# Patient Record
Sex: FEMALE | Race: WHITE | NOT HISPANIC OR LATINO | Employment: OTHER | URBAN - METROPOLITAN AREA
[De-identification: names, ages, dates, MRNs, and addresses within clinical notes are randomized per-mention and may not be internally consistent; named-entity substitution may affect disease eponyms.]

---

## 2019-04-23 ENCOUNTER — HOSPITAL ENCOUNTER (EMERGENCY)
Facility: HOSPITAL | Age: 74
Discharge: HOME | End: 2019-04-23
Attending: EMERGENCY MEDICINE
Payer: COMMERCIAL

## 2019-04-23 ENCOUNTER — APPOINTMENT (EMERGENCY)
Dept: RADIOLOGY | Facility: HOSPITAL | Age: 74
End: 2019-04-23
Payer: COMMERCIAL

## 2019-04-23 VITALS
RESPIRATION RATE: 14 BRPM | SYSTOLIC BLOOD PRESSURE: 146 MMHG | TEMPERATURE: 99.2 F | WEIGHT: 114 LBS | DIASTOLIC BLOOD PRESSURE: 83 MMHG | HEART RATE: 93 BPM | OXYGEN SATURATION: 100 %

## 2019-04-23 DIAGNOSIS — M25.562 KNEE PAIN, LEFT: ICD-10-CM
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
## 2019-04-23 PROCEDURE — 99282 EMERGENCY DEPT VISIT SF MDM: CPT | Performed by: EMERGENCY MEDICINE

## 2019-04-23 PROCEDURE — 93005 ELECTROCARDIOGRAM TRACING: CPT

## 2019-04-23 PROCEDURE — 99283 EMERGENCY DEPT VISIT LOW MDM: CPT

## 2019-04-23 PROCEDURE — 73564 X-RAY EXAM KNEE 4 OR MORE: CPT

## 2019-04-23 RX ORDER — LACTOSE-REDUCED FOOD
8 LIQUID (ML) ORAL 2 TIMES DAILY
Status: ON HOLD | COMMUNITY
End: 2019-05-03 | Stop reason: SDUPTHER

## 2019-04-23 RX ORDER — LISINOPRIL 5 MG/1
5 TABLET ORAL DAILY
Status: ON HOLD | COMMUNITY
Start: 2018-06-12 | End: 2019-05-03 | Stop reason: SDUPTHER

## 2019-04-23 RX ORDER — DOCUSATE SODIUM 100 MG/1
100 CAPSULE, LIQUID FILLED ORAL DAILY
Status: ON HOLD | COMMUNITY
Start: 2018-02-06 | End: 2019-05-03 | Stop reason: SDUPTHER

## 2019-04-23 RX ORDER — PROMETHAZINE HYDROCHLORIDE 25 MG/1
25 TABLET ORAL EVERY 8 HOURS PRN
COMMUNITY
End: 2019-05-01 | Stop reason: HOSPADM

## 2019-04-23 RX ORDER — RIVASTIGMINE TARTRATE 4.5 MG/1
1 CAPSULE ORAL DAILY
Status: ON HOLD | COMMUNITY
Start: 2018-11-26 | End: 2019-05-03 | Stop reason: SDUPTHER

## 2019-04-23 RX ORDER — MULTIVIT-MIN/FA/LYCOPEN/LUTEIN .4-300-25
1 TABLET ORAL DAILY
COMMUNITY
End: 2019-05-03 | Stop reason: HOSPADM

## 2019-04-24 ENCOUNTER — HOSPITAL ENCOUNTER (INPATIENT)
Facility: HOSPITAL | Age: 74
LOS: 7 days | Discharge: NURSING, REHAB OR LONG-TERM CARE | DRG: 689 | End: 2019-05-01
Attending: EMERGENCY MEDICINE | Admitting: INTERNAL MEDICINE
Payer: COMMERCIAL

## 2019-04-24 ENCOUNTER — APPOINTMENT (EMERGENCY)
Dept: CT IMAGING | Facility: HOSPITAL | Age: 74
DRG: 689 | End: 2019-04-24
Payer: COMMERCIAL

## 2019-04-24 ENCOUNTER — HOSPITAL ENCOUNTER (EMERGENCY)
Facility: HOSPITAL | Age: 74
Discharge: HOME | DRG: 689 | End: 2019-04-24
Attending: EMERGENCY MEDICINE
Payer: COMMERCIAL

## 2019-04-24 VITALS
HEART RATE: 86 BPM | OXYGEN SATURATION: 100 % | SYSTOLIC BLOOD PRESSURE: 178 MMHG | RESPIRATION RATE: 16 BRPM | TEMPERATURE: 98.3 F | DIASTOLIC BLOOD PRESSURE: 76 MMHG
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
## 2019-04-24 DIAGNOSIS — N30.00 ACUTE CYSTITIS WITHOUT HEMATURIA: ICD-10-CM

## 2019-04-24 DIAGNOSIS — S09.90XA INJURY OF HEAD, INITIAL ENCOUNTER: ICD-10-CM

## 2019-04-24 DIAGNOSIS — R29.6 RECURRENT FALLS: ICD-10-CM

## 2019-04-24 DIAGNOSIS — S01.81XA LACERATION OF FOREHEAD, INITIAL ENCOUNTER: ICD-10-CM

## 2019-04-24 DIAGNOSIS — G31.09 FRONTOTEMPORAL DEMENTIA WITHOUT BEHAVIORAL DISTURBANCE (HCC): Chronic | ICD-10-CM

## 2019-04-24 DIAGNOSIS — F02.80 FRONTOTEMPORAL DEMENTIA WITHOUT BEHAVIORAL DISTURBANCE (HCC): Chronic | ICD-10-CM
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
## 2019-04-24 DIAGNOSIS — R26.2 AMBULATORY DYSFUNCTION: ICD-10-CM

## 2019-04-24 PROBLEM — G31.01 PRIMARY PROGRESSIVE APHASIA (HCC): Chronic | Status: ACTIVE | Noted: 2019-04-24

## 2019-04-24 PROBLEM — E87.6 HYPOKALEMIA: Status: ACTIVE | Noted: 2019-04-24

## 2019-04-24 PROBLEM — E78.00 PURE HYPERCHOLESTEROLEMIA: Chronic | Status: ACTIVE | Noted: 2019-04-24

## 2019-04-24 PROBLEM — I10 BENIGN ESSENTIAL HYPERTENSION: Chronic | Status: ACTIVE | Noted: 2019-04-24

## 2019-04-24 LAB
ALBUMIN SERPL BCP-MCNC: 3.7 G/DL (ref 3.5–5)
ALP SERPL-CCNC: 86 U/L (ref 46–116)
ALT SERPL W P-5'-P-CCNC: 29 U/L (ref 12–78)
ANION GAP SERPL CALCULATED.3IONS-SCNC: 8 MMOL/L (ref 4–13)
AST SERPL W P-5'-P-CCNC: 37 U/L (ref 5–45)
ATRIAL RATE: 82 BPM
BACTERIA UR QL AUTO: ABNORMAL /HPF
BASOPHILS # BLD AUTO: 0.04 THOUSANDS/ΜL (ref 0–0.1)
BASOPHILS NFR BLD AUTO: 1 % (ref 0–1)
BILIRUB SERPL-MCNC: 0.44 MG/DL (ref 0.2–1)
BILIRUB UR QL STRIP: NEGATIVE
BUN SERPL-MCNC: 22 MG/DL (ref 5–25)
CALCIUM SERPL-MCNC: 9.7 MG/DL (ref 8.3–10.1)
CHLORIDE SERPL-SCNC: 107 MMOL/L (ref 100–108)
CLARITY UR: CLEAR
CO2 SERPL-SCNC: 27 MMOL/L (ref 21–32)
COLOR UR: YELLOW
CREAT SERPL-MCNC: 0.89 MG/DL (ref 0.6–1.3)
EOSINOPHIL # BLD AUTO: 0.06 THOUSAND/ΜL (ref 0–0.61)
EOSINOPHIL NFR BLD AUTO: 1 % (ref 0–6)
ERYTHROCYTE [DISTWIDTH] IN BLOOD BY AUTOMATED COUNT: 13.3 % (ref 11.6–15.1)
GFR SERPL CREATININE-BSD FRML MDRD: 64 ML/MIN/1.73SQ M
GLUCOSE SERPL-MCNC: 105 MG/DL (ref 65–140)
GLUCOSE UR STRIP-MCNC: NEGATIVE MG/DL
HCT VFR BLD AUTO: 38.4 % (ref 34.8–46.1)
HGB BLD-MCNC: 12.6 G/DL (ref 11.5–15.4)
HGB UR QL STRIP.AUTO: NEGATIVE
IMM GRANULOCYTES # BLD AUTO: 0.02 THOUSAND/UL (ref 0–0.2)
IMM GRANULOCYTES NFR BLD AUTO: 0 % (ref 0–2)
KETONES UR STRIP-MCNC: ABNORMAL MG/DL
LEUKOCYTE ESTERASE UR QL STRIP: ABNORMAL
LYMPHOCYTES # BLD AUTO: 0.96 THOUSANDS/ΜL (ref 0.6–4.47)
LYMPHOCYTES NFR BLD AUTO: 12 % (ref 14–44)
MCH RBC QN AUTO: 29.6 PG (ref 26.8–34.3)
MCHC RBC AUTO-ENTMCNC: 32.8 G/DL (ref 31.4–37.4)
MCV RBC AUTO: 90 FL (ref 82–98)
MONOCYTES # BLD AUTO: 0.71 THOUSAND/ΜL (ref 0.17–1.22)
MONOCYTES NFR BLD AUTO: 9 % (ref 4–12)
MUCOUS THREADS UR QL AUTO: ABNORMAL
NEUTROPHILS # BLD AUTO: 6.07 THOUSANDS/ΜL (ref 1.85–7.62)
NEUTS SEG NFR BLD AUTO: 77 % (ref 43–75)
NITRITE UR QL STRIP: NEGATIVE
NON-SQ EPI CELLS URNS QL MICRO: ABNORMAL /HPF
NRBC BLD AUTO-RTO: 0 /100 WBCS
P AXIS: 64 DEGREES
PH UR STRIP.AUTO: 6 [PH]
PLATELET # BLD AUTO: 346 THOUSANDS/UL (ref 149–390)
PMV BLD AUTO: 9.4 FL (ref 8.9–12.7)
POTASSIUM SERPL-SCNC: 3.4 MMOL/L (ref 3.5–5.3)
PR INTERVAL: 160 MS
PROT SERPL-MCNC: 7.4 G/DL (ref 6.4–8.2)
PROT UR STRIP-MCNC: NEGATIVE MG/DL
QRS AXIS: 67 DEGREES
QRSD INTERVAL: 76 MS
QT INTERVAL: 378 MS
QTC INTERVAL: 441 MS
RBC # BLD AUTO: 4.25 MILLION/UL (ref 3.81–5.12)
RBC #/AREA URNS AUTO: ABNORMAL /HPF
SODIUM SERPL-SCNC: 142 MMOL/L (ref 136–145)
SP GR UR STRIP.AUTO: 1.02 (ref 1–1.03)
T WAVE AXIS: 48 DEGREES
UROBILINOGEN UR QL STRIP.AUTO: 0.2 E.U./DL
VENTRICULAR RATE: 82 BPM
WBC # BLD AUTO: 7.86 THOUSAND/UL (ref 4.31–10.16)
WBC #/AREA URNS AUTO: ABNORMAL /HPF

## 2019-04-24 PROCEDURE — 87086 URINE CULTURE/COLONY COUNT: CPT | Performed by: EMERGENCY MEDICINE

## 2019-04-24 PROCEDURE — 90715 TDAP VACCINE 7 YRS/> IM: CPT | Performed by: EMERGENCY MEDICINE

## 2019-04-24 PROCEDURE — 93005 ELECTROCARDIOGRAM TRACING: CPT

## 2019-04-24 PROCEDURE — 99223 1ST HOSP IP/OBS HIGH 75: CPT | Performed by: NURSE PRACTITIONER

## 2019-04-24 PROCEDURE — 93010 ELECTROCARDIOGRAM REPORT: CPT | Performed by: INTERNAL MEDICINE

## 2019-04-24 PROCEDURE — 72125 CT NECK SPINE W/O DYE: CPT

## 2019-04-24 PROCEDURE — 80053 COMPREHEN METABOLIC PANEL: CPT | Performed by: EMERGENCY MEDICINE

## 2019-04-24 PROCEDURE — 70450 CT HEAD/BRAIN W/O DYE: CPT

## 2019-04-24 PROCEDURE — 87186 SC STD MICRODIL/AGAR DIL: CPT | Performed by: EMERGENCY MEDICINE

## 2019-04-24 PROCEDURE — 81001 URINALYSIS AUTO W/SCOPE: CPT | Performed by: EMERGENCY MEDICINE

## 2019-04-24 PROCEDURE — 85025 COMPLETE CBC W/AUTO DIFF WBC: CPT | Performed by: EMERGENCY MEDICINE

## 2019-04-24 PROCEDURE — 99285 EMERGENCY DEPT VISIT HI MDM: CPT | Performed by: EMERGENCY MEDICINE

## 2019-04-24 PROCEDURE — 87077 CULTURE AEROBIC IDENTIFY: CPT | Performed by: EMERGENCY MEDICINE

## 2019-04-24 PROCEDURE — 12011 RPR F/E/E/N/L/M 2.5 CM/<: CPT | Performed by: EMERGENCY MEDICINE

## 2019-04-24 PROCEDURE — NC001 PR NO CHARGE: Performed by: EMERGENCY MEDICINE

## 2019-04-24 PROCEDURE — 36415 COLL VENOUS BLD VENIPUNCTURE: CPT | Performed by: EMERGENCY MEDICINE

## 2019-04-24 PROCEDURE — 90471 IMMUNIZATION ADMIN: CPT

## 2019-04-24 PROCEDURE — 99284 EMERGENCY DEPT VISIT MOD MDM: CPT

## 2019-04-24 PROCEDURE — 99285 EMERGENCY DEPT VISIT HI MDM: CPT

## 2019-04-24 RX ORDER — ACETAMINOPHEN 325 MG/1
650 TABLET ORAL EVERY 6 HOURS PRN
Status: DISCONTINUED | OUTPATIENT
Start: 2019-04-24 | End: 2019-05-01 | Stop reason: HOSPADM

## 2019-04-24 RX ORDER — B-COMPLEX WITH VITAMIN C
1 TABLET ORAL
Status: DISCONTINUED | OUTPATIENT
Start: 2019-04-25 | End: 2019-05-01 | Stop reason: HOSPADM

## 2019-04-24 RX ORDER — DOCUSATE SODIUM 100 MG/1
100 CAPSULE, LIQUID FILLED ORAL DAILY
Status: DISCONTINUED | OUTPATIENT
Start: 2019-04-25 | End: 2019-05-01 | Stop reason: HOSPADM

## 2019-04-24 RX ORDER — MINERAL OIL AND PETROLATUM 150; 830 MG/G; MG/G
OINTMENT OPHTHALMIC
Status: DISCONTINUED | OUTPATIENT
Start: 2019-04-24 | End: 2019-05-01 | Stop reason: HOSPADM

## 2019-04-24 RX ORDER — CHOLECALCIFEROL (VITAMIN D3) 125 MCG
1000 CAPSULE ORAL DAILY
Status: DISCONTINUED | OUTPATIENT
Start: 2019-04-25 | End: 2019-05-01 | Stop reason: HOSPADM

## 2019-04-24 RX ORDER — SENNOSIDES 8.6 MG
1 TABLET ORAL
Status: DISCONTINUED | OUTPATIENT
Start: 2019-04-24 | End: 2019-05-01 | Stop reason: HOSPADM

## 2019-04-24 RX ORDER — SODIUM CHLORIDE 9 MG/ML
75 INJECTION, SOLUTION INTRAVENOUS CONTINUOUS
Status: DISCONTINUED | OUTPATIENT
Start: 2019-04-24 | End: 2019-04-25

## 2019-04-24 RX ORDER — ONDANSETRON 2 MG/ML
4 INJECTION INTRAMUSCULAR; INTRAVENOUS EVERY 6 HOURS PRN
Status: DISCONTINUED | OUTPATIENT
Start: 2019-04-24 | End: 2019-05-01 | Stop reason: HOSPADM

## 2019-04-24 RX ORDER — LISINOPRIL 5 MG/1
5 TABLET ORAL DAILY
Status: DISCONTINUED | OUTPATIENT
Start: 2019-04-25 | End: 2019-05-01 | Stop reason: HOSPADM

## 2019-04-24 RX ORDER — POTASSIUM CHLORIDE 20 MEQ/1
20 TABLET, EXTENDED RELEASE ORAL ONCE
Status: COMPLETED | OUTPATIENT
Start: 2019-04-24 | End: 2019-04-24

## 2019-04-24 RX ADMIN — POTASSIUM CHLORIDE 20 MEQ: 1500 TABLET, EXTENDED RELEASE ORAL at 22:01

## 2019-04-24 RX ADMIN — TETANUS TOXOID, REDUCED DIPHTHERIA TOXOID AND ACELLULAR PERTUSSIS VACCINE, ADSORBED 0.5 ML: 5; 2.5; 8; 8; 2.5 SUSPENSION INTRAMUSCULAR at 08:23

## 2019-04-24 RX ADMIN — WHITE PETROLATUM 57.7 %-MINERAL OIL 31.9 % EYE OINTMENT: at 22:01

## 2019-04-24 RX ADMIN — SODIUM CHLORIDE 75 ML/HR: 0.9 INJECTION, SOLUTION INTRAVENOUS at 20:18

## 2019-04-24 RX ADMIN — Medication 1 APPLICATION: at 07:25

## 2019-04-25 PROBLEM — N30.00 ACUTE CYSTITIS WITHOUT HEMATURIA: Status: ACTIVE | Noted: 2019-04-25

## 2019-04-25 LAB
ANION GAP SERPL CALCULATED.3IONS-SCNC: 7 MMOL/L (ref 4–13)
ATRIAL RATE: 86 BPM
ATRIAL RATE: 90 BPM
BASOPHILS # BLD AUTO: 0.06 THOUSANDS/ΜL (ref 0–0.1)
BASOPHILS NFR BLD AUTO: 1 % (ref 0–1)
BUN SERPL-MCNC: 13 MG/DL (ref 5–25)
CALCIUM SERPL-MCNC: 9 MG/DL (ref 8.3–10.1)
CHLORIDE SERPL-SCNC: 110 MMOL/L (ref 100–108)
CO2 SERPL-SCNC: 26 MMOL/L (ref 21–32)
CREAT SERPL-MCNC: 0.69 MG/DL (ref 0.6–1.3)
EOSINOPHIL # BLD AUTO: 0.23 THOUSAND/ΜL (ref 0–0.61)
EOSINOPHIL NFR BLD AUTO: 3 % (ref 0–6)
ERYTHROCYTE [DISTWIDTH] IN BLOOD BY AUTOMATED COUNT: 13.3 % (ref 11.6–15.1)
GFR SERPL CREATININE-BSD FRML MDRD: 85 ML/MIN/1.73SQ M
GLUCOSE SERPL-MCNC: 93 MG/DL (ref 65–140)
HCT VFR BLD AUTO: 37 % (ref 34.8–46.1)
HGB BLD-MCNC: 12 G/DL (ref 11.5–15.4)
IMM GRANULOCYTES # BLD AUTO: 0.03 THOUSAND/UL (ref 0–0.2)
IMM GRANULOCYTES NFR BLD AUTO: 0 % (ref 0–2)
LYMPHOCYTES # BLD AUTO: 2.03 THOUSANDS/ΜL (ref 0.6–4.47)
LYMPHOCYTES NFR BLD AUTO: 27 % (ref 14–44)
MCH RBC QN AUTO: 29.5 PG (ref 26.8–34.3)
MCHC RBC AUTO-ENTMCNC: 32.4 G/DL (ref 31.4–37.4)
MCV RBC AUTO: 91 FL (ref 82–98)
MONOCYTES # BLD AUTO: 0.68 THOUSAND/ΜL (ref 0.17–1.22)
MONOCYTES NFR BLD AUTO: 9 % (ref 4–12)
NEUTROPHILS # BLD AUTO: 4.54 THOUSANDS/ΜL (ref 1.85–7.62)
NEUTS SEG NFR BLD AUTO: 60 % (ref 43–75)
NRBC BLD AUTO-RTO: 0 /100 WBCS
P AXIS: 68 DEGREES
P AXIS: 73 DEGREES
PLATELET # BLD AUTO: 308 THOUSANDS/UL (ref 149–390)
PMV BLD AUTO: 9.4 FL (ref 8.9–12.7)
POTASSIUM SERPL-SCNC: 3.8 MMOL/L (ref 3.5–5.3)
PR INTERVAL: 156 MS
PR INTERVAL: 174 MS
QRS AXIS: 62 DEGREES
QRS AXIS: 65 DEGREES
QRSD INTERVAL: 76 MS
QRSD INTERVAL: 78 MS
QT INTERVAL: 358 MS
QT INTERVAL: 366 MS
QTC INTERVAL: 428 MS
QTC INTERVAL: 447 MS
RBC # BLD AUTO: 4.07 MILLION/UL (ref 3.81–5.12)
SODIUM SERPL-SCNC: 143 MMOL/L (ref 136–145)
T WAVE AXIS: 58 DEGREES
T WAVE AXIS: 59 DEGREES
VENTRICULAR RATE: 86 BPM
VENTRICULAR RATE: 90 BPM
WBC # BLD AUTO: 7.57 THOUSAND/UL (ref 4.31–10.16)

## 2019-04-25 PROCEDURE — G8979 MOBILITY GOAL STATUS: HCPCS

## 2019-04-25 PROCEDURE — 93010 ELECTROCARDIOGRAM REPORT: CPT | Performed by: INTERNAL MEDICINE

## 2019-04-25 PROCEDURE — 97163 PT EVAL HIGH COMPLEX 45 MIN: CPT

## 2019-04-25 PROCEDURE — 99222 1ST HOSP IP/OBS MODERATE 55: CPT | Performed by: PSYCHIATRY & NEUROLOGY

## 2019-04-25 PROCEDURE — 80048 BASIC METABOLIC PNL TOTAL CA: CPT | Performed by: NURSE PRACTITIONER

## 2019-04-25 PROCEDURE — 85025 COMPLETE CBC W/AUTO DIFF WBC: CPT | Performed by: NURSE PRACTITIONER

## 2019-04-25 PROCEDURE — 99232 SBSQ HOSP IP/OBS MODERATE 35: CPT | Performed by: INTERNAL MEDICINE

## 2019-04-25 PROCEDURE — G8978 MOBILITY CURRENT STATUS: HCPCS

## 2019-04-25 PROCEDURE — 97116 GAIT TRAINING THERAPY: CPT

## 2019-04-25 RX ADMIN — RIVASTIGMINE TARTRATE 4.5 MG: 3 CAPSULE ORAL at 08:31

## 2019-04-25 RX ADMIN — CEFTRIAXONE 1000 MG: 1 INJECTION, POWDER, FOR SOLUTION INTRAMUSCULAR; INTRAVENOUS at 05:19

## 2019-04-25 RX ADMIN — ENOXAPARIN SODIUM 40 MG: 40 INJECTION SUBCUTANEOUS at 08:22

## 2019-04-25 RX ADMIN — DOCUSATE SODIUM 100 MG: 100 CAPSULE, LIQUID FILLED ORAL at 08:23

## 2019-04-25 RX ADMIN — LISINOPRIL 5 MG: 5 TABLET ORAL at 08:23

## 2019-04-25 RX ADMIN — Medication 1 TABLET: at 08:23

## 2019-04-25 RX ADMIN — WHITE PETROLATUM 57.7 %-MINERAL OIL 31.9 % EYE OINTMENT: at 22:30

## 2019-04-25 RX ADMIN — CALCIUM CARBONATE-VITAMIN D TAB 500 MG-200 UNIT 1 TABLET: 500-200 TAB at 08:23

## 2019-04-25 RX ADMIN — CYANOCOBALAMIN TAB 500 MCG 1000 MCG: 500 TAB at 08:24

## 2019-04-26 ENCOUNTER — APPOINTMENT (INPATIENT)
Dept: MRI IMAGING | Facility: HOSPITAL | Age: 74
DRG: 689 | End: 2019-04-26
Payer: COMMERCIAL

## 2019-04-26 LAB
ANION GAP SERPL CALCULATED.3IONS-SCNC: 5 MMOL/L (ref 4–13)
BACTERIA UR CULT: ABNORMAL
BACTERIA UR CULT: ABNORMAL
BUN SERPL-MCNC: 9 MG/DL (ref 5–25)
CALCIUM SERPL-MCNC: 9.3 MG/DL (ref 8.3–10.1)
CHLORIDE SERPL-SCNC: 109 MMOL/L (ref 100–108)
CO2 SERPL-SCNC: 27 MMOL/L (ref 21–32)
CREAT SERPL-MCNC: 0.7 MG/DL (ref 0.6–1.3)
ERYTHROCYTE [DISTWIDTH] IN BLOOD BY AUTOMATED COUNT: 13.1 % (ref 11.6–15.1)
GFR SERPL CREATININE-BSD FRML MDRD: 85 ML/MIN/1.73SQ M
GLUCOSE SERPL-MCNC: 92 MG/DL (ref 65–140)
HCT VFR BLD AUTO: 36.1 % (ref 34.8–46.1)
HGB BLD-MCNC: 11.7 G/DL (ref 11.5–15.4)
MCH RBC QN AUTO: 29.2 PG (ref 26.8–34.3)
MCHC RBC AUTO-ENTMCNC: 32.4 G/DL (ref 31.4–37.4)
MCV RBC AUTO: 90 FL (ref 82–98)
PLATELET # BLD AUTO: 274 THOUSANDS/UL (ref 149–390)
PMV BLD AUTO: 9.3 FL (ref 8.9–12.7)
POTASSIUM SERPL-SCNC: 4 MMOL/L (ref 3.5–5.3)
RBC # BLD AUTO: 4.01 MILLION/UL (ref 3.81–5.12)
SODIUM SERPL-SCNC: 141 MMOL/L (ref 136–145)
WBC # BLD AUTO: 5.26 THOUSAND/UL (ref 4.31–10.16)

## 2019-04-26 PROCEDURE — 99232 SBSQ HOSP IP/OBS MODERATE 35: CPT | Performed by: INTERNAL MEDICINE

## 2019-04-26 PROCEDURE — G8987 SELF CARE CURRENT STATUS: HCPCS

## 2019-04-26 PROCEDURE — 70551 MRI BRAIN STEM W/O DYE: CPT

## 2019-04-26 PROCEDURE — 80048 BASIC METABOLIC PNL TOTAL CA: CPT | Performed by: INTERNAL MEDICINE

## 2019-04-26 PROCEDURE — G8988 SELF CARE GOAL STATUS: HCPCS

## 2019-04-26 PROCEDURE — 97166 OT EVAL MOD COMPLEX 45 MIN: CPT

## 2019-04-26 PROCEDURE — 85027 COMPLETE CBC AUTOMATED: CPT | Performed by: INTERNAL MEDICINE

## 2019-04-26 PROCEDURE — 99232 SBSQ HOSP IP/OBS MODERATE 35: CPT | Performed by: PSYCHIATRY & NEUROLOGY

## 2019-04-26 RX ADMIN — RIVASTIGMINE TARTRATE 4.5 MG: 3 CAPSULE ORAL at 08:06

## 2019-04-26 RX ADMIN — WHITE PETROLATUM 57.7 %-MINERAL OIL 31.9 % EYE OINTMENT: at 21:15

## 2019-04-26 RX ADMIN — LISINOPRIL 5 MG: 5 TABLET ORAL at 08:06

## 2019-04-26 RX ADMIN — CALCIUM CARBONATE-VITAMIN D TAB 500 MG-200 UNIT 1 TABLET: 500-200 TAB at 08:06

## 2019-04-26 RX ADMIN — ENOXAPARIN SODIUM 40 MG: 40 INJECTION SUBCUTANEOUS at 08:06

## 2019-04-26 RX ADMIN — CYANOCOBALAMIN TAB 500 MCG 1000 MCG: 500 TAB at 08:06

## 2019-04-26 RX ADMIN — Medication 1 TABLET: at 08:06

## 2019-04-26 RX ADMIN — CEFTRIAXONE 1000 MG: 1 INJECTION, POWDER, FOR SOLUTION INTRAMUSCULAR; INTRAVENOUS at 04:30

## 2019-04-26 RX ADMIN — DOCUSATE SODIUM 100 MG: 100 CAPSULE, LIQUID FILLED ORAL at 08:06

## 2019-04-27 PROCEDURE — 97116 GAIT TRAINING THERAPY: CPT

## 2019-04-27 PROCEDURE — 99232 SBSQ HOSP IP/OBS MODERATE 35: CPT | Performed by: INTERNAL MEDICINE

## 2019-04-27 PROCEDURE — 99232 SBSQ HOSP IP/OBS MODERATE 35: CPT | Performed by: PSYCHIATRY & NEUROLOGY

## 2019-04-27 PROCEDURE — 97530 THERAPEUTIC ACTIVITIES: CPT

## 2019-04-27 PROCEDURE — 97110 THERAPEUTIC EXERCISES: CPT

## 2019-04-27 RX ORDER — CEPHALEXIN 500 MG/1
500 CAPSULE ORAL EVERY 12 HOURS SCHEDULED
Status: DISCONTINUED | OUTPATIENT
Start: 2019-04-27 | End: 2019-05-01 | Stop reason: HOSPADM

## 2019-04-27 RX ADMIN — CYANOCOBALAMIN TAB 500 MCG 1000 MCG: 500 TAB at 08:21

## 2019-04-27 RX ADMIN — CEFTRIAXONE 1000 MG: 1 INJECTION, POWDER, FOR SOLUTION INTRAMUSCULAR; INTRAVENOUS at 04:30

## 2019-04-27 RX ADMIN — WHITE PETROLATUM 57.7 %-MINERAL OIL 31.9 % EYE OINTMENT: at 20:59

## 2019-04-27 RX ADMIN — CEPHALEXIN 500 MG: 500 CAPSULE ORAL at 20:16

## 2019-04-27 RX ADMIN — RIVASTIGMINE TARTRATE 4.5 MG: 3 CAPSULE ORAL at 08:21

## 2019-04-27 RX ADMIN — ENOXAPARIN SODIUM 40 MG: 40 INJECTION SUBCUTANEOUS at 08:21

## 2019-04-27 RX ADMIN — DOCUSATE SODIUM 100 MG: 100 CAPSULE, LIQUID FILLED ORAL at 08:21

## 2019-04-27 RX ADMIN — CALCIUM CARBONATE-VITAMIN D TAB 500 MG-200 UNIT 1 TABLET: 500-200 TAB at 08:21

## 2019-04-27 RX ADMIN — LISINOPRIL 5 MG: 5 TABLET ORAL at 08:21

## 2019-04-27 RX ADMIN — Medication 1 TABLET: at 08:21

## 2019-04-28 PROCEDURE — 99232 SBSQ HOSP IP/OBS MODERATE 35: CPT | Performed by: INTERNAL MEDICINE

## 2019-04-28 RX ADMIN — ENOXAPARIN SODIUM 40 MG: 40 INJECTION SUBCUTANEOUS at 07:52

## 2019-04-28 RX ADMIN — CYANOCOBALAMIN TAB 500 MCG 1000 MCG: 500 TAB at 07:52

## 2019-04-28 RX ADMIN — CEPHALEXIN 500 MG: 500 CAPSULE ORAL at 07:56

## 2019-04-28 RX ADMIN — RIVASTIGMINE TARTRATE 4.5 MG: 3 CAPSULE ORAL at 07:53

## 2019-04-28 RX ADMIN — LISINOPRIL 5 MG: 5 TABLET ORAL at 07:57

## 2019-04-28 RX ADMIN — WHITE PETROLATUM 57.7 %-MINERAL OIL 31.9 % EYE OINTMENT: at 21:01

## 2019-04-28 RX ADMIN — CEPHALEXIN 500 MG: 500 CAPSULE ORAL at 21:01

## 2019-04-28 RX ADMIN — Medication 1 TABLET: at 07:52

## 2019-04-28 RX ADMIN — CALCIUM CARBONATE-VITAMIN D TAB 500 MG-200 UNIT 1 TABLET: 500-200 TAB at 07:51

## 2019-04-28 RX ADMIN — DOCUSATE SODIUM 100 MG: 100 CAPSULE, LIQUID FILLED ORAL at 07:52

## 2019-04-29 PROBLEM — S01.81XA FOREHEAD LACERATION: Status: ACTIVE | Noted: 2019-04-29

## 2019-04-29 PROBLEM — I73.9 PERIPHERAL VASCULAR DISEASE (HCC): Status: ACTIVE | Noted: 2019-04-29

## 2019-04-29 PROBLEM — E87.6 HYPOKALEMIA: Status: RESOLVED | Noted: 2019-04-24 | Resolved: 2019-04-29

## 2019-04-29 LAB
ANION GAP SERPL CALCULATED.3IONS-SCNC: 7 MMOL/L (ref 4–13)
BUN SERPL-MCNC: 14 MG/DL (ref 5–25)
CALCIUM SERPL-MCNC: 9.7 MG/DL (ref 8.3–10.1)
CHLORIDE SERPL-SCNC: 104 MMOL/L (ref 100–108)
CO2 SERPL-SCNC: 28 MMOL/L (ref 21–32)
CREAT SERPL-MCNC: 0.66 MG/DL (ref 0.6–1.3)
ERYTHROCYTE [DISTWIDTH] IN BLOOD BY AUTOMATED COUNT: 13.2 % (ref 11.6–15.1)
GFR SERPL CREATININE-BSD FRML MDRD: 87 ML/MIN/1.73SQ M
GLUCOSE SERPL-MCNC: 105 MG/DL (ref 65–140)
HCT VFR BLD AUTO: 44.2 % (ref 34.8–46.1)
HGB BLD-MCNC: 14.3 G/DL (ref 11.5–15.4)
MCH RBC QN AUTO: 29.1 PG (ref 26.8–34.3)
MCHC RBC AUTO-ENTMCNC: 32.4 G/DL (ref 31.4–37.4)
MCV RBC AUTO: 90 FL (ref 82–98)
PLATELET # BLD AUTO: 316 THOUSANDS/UL (ref 149–390)
PMV BLD AUTO: 9.4 FL (ref 8.9–12.7)
POTASSIUM SERPL-SCNC: 4.2 MMOL/L (ref 3.5–5.3)
RBC # BLD AUTO: 4.91 MILLION/UL (ref 3.81–5.12)
SODIUM SERPL-SCNC: 139 MMOL/L (ref 136–145)
WBC # BLD AUTO: 8.22 THOUSAND/UL (ref 4.31–10.16)

## 2019-04-29 PROCEDURE — 99232 SBSQ HOSP IP/OBS MODERATE 35: CPT | Performed by: INTERNAL MEDICINE

## 2019-04-29 PROCEDURE — 80048 BASIC METABOLIC PNL TOTAL CA: CPT | Performed by: INTERNAL MEDICINE

## 2019-04-29 PROCEDURE — 85027 COMPLETE CBC AUTOMATED: CPT | Performed by: INTERNAL MEDICINE

## 2019-04-29 RX ADMIN — ENOXAPARIN SODIUM 40 MG: 40 INJECTION SUBCUTANEOUS at 08:26

## 2019-04-29 RX ADMIN — DOCUSATE SODIUM 100 MG: 100 CAPSULE, LIQUID FILLED ORAL at 08:26

## 2019-04-29 RX ADMIN — CYANOCOBALAMIN TAB 500 MCG 1000 MCG: 500 TAB at 08:26

## 2019-04-29 RX ADMIN — CEPHALEXIN 500 MG: 500 CAPSULE ORAL at 08:26

## 2019-04-29 RX ADMIN — Medication 1 TABLET: at 08:26

## 2019-04-29 RX ADMIN — CEPHALEXIN 500 MG: 500 CAPSULE ORAL at 22:16

## 2019-04-29 RX ADMIN — RIVASTIGMINE TARTRATE 4.5 MG: 3 CAPSULE ORAL at 08:27

## 2019-04-29 RX ADMIN — LISINOPRIL 5 MG: 5 TABLET ORAL at 08:26

## 2019-04-29 RX ADMIN — WHITE PETROLATUM 57.7 %-MINERAL OIL 31.9 % EYE OINTMENT: at 22:16

## 2019-04-29 RX ADMIN — CALCIUM CARBONATE-VITAMIN D TAB 500 MG-200 UNIT 1 TABLET: 500-200 TAB at 08:26

## 2019-04-30 PROCEDURE — 97110 THERAPEUTIC EXERCISES: CPT

## 2019-04-30 PROCEDURE — 97116 GAIT TRAINING THERAPY: CPT

## 2019-04-30 PROCEDURE — 97535 SELF CARE MNGMENT TRAINING: CPT

## 2019-04-30 PROCEDURE — 99239 HOSP IP/OBS DSCHRG MGMT >30: CPT | Performed by: INTERNAL MEDICINE

## 2019-04-30 RX ORDER — LIDOCAINE 50 MG/G
1 PATCH TOPICAL DAILY
Status: DISCONTINUED | OUTPATIENT
Start: 2019-04-30 | End: 2019-05-01 | Stop reason: HOSPADM

## 2019-04-30 RX ORDER — CEPHALEXIN 500 MG/1
500 CAPSULE ORAL EVERY 12 HOURS SCHEDULED
Qty: 3 CAPSULE | Refills: 0 | Status: SHIPPED | OUTPATIENT
Start: 2019-04-30 | End: 2019-05-01

## 2019-04-30 RX ORDER — ACETAMINOPHEN 325 MG/1
650 TABLET ORAL EVERY 6 HOURS PRN
Qty: 30 TABLET | Refills: 0 | Status: ON HOLD | OUTPATIENT
Start: 2019-04-30 | End: 2019-05-03 | Stop reason: SDUPTHER

## 2019-04-30 RX ORDER — SENNOSIDES 8.6 MG
1 TABLET ORAL
Qty: 120 EACH | Refills: 0 | Status: SHIPPED | OUTPATIENT
Start: 2019-04-30 | End: 2019-05-03 | Stop reason: HOSPADM

## 2019-04-30 RX ORDER — LIDOCAINE 50 MG/G
1 PATCH TOPICAL DAILY
Qty: 30 PATCH | Refills: 0 | Status: SHIPPED | OUTPATIENT
Start: 2019-04-30 | End: 2019-05-03 | Stop reason: HOSPADM

## 2019-04-30 RX ADMIN — LISINOPRIL 5 MG: 5 TABLET ORAL at 08:02

## 2019-04-30 RX ADMIN — Medication 1 TABLET: at 08:02

## 2019-04-30 RX ADMIN — RIVASTIGMINE TARTRATE 4.5 MG: 3 CAPSULE ORAL at 08:04

## 2019-04-30 RX ADMIN — CYANOCOBALAMIN TAB 500 MCG 1000 MCG: 500 TAB at 08:02

## 2019-04-30 RX ADMIN — LIDOCAINE 1 PATCH: 50 PATCH TOPICAL at 13:57

## 2019-04-30 RX ADMIN — DOCUSATE SODIUM 100 MG: 100 CAPSULE, LIQUID FILLED ORAL at 08:02

## 2019-04-30 RX ADMIN — CEPHALEXIN 500 MG: 500 CAPSULE ORAL at 22:04

## 2019-04-30 RX ADMIN — ENOXAPARIN SODIUM 40 MG: 40 INJECTION SUBCUTANEOUS at 08:02

## 2019-04-30 RX ADMIN — ACETAMINOPHEN 650 MG: 325 TABLET ORAL at 12:00

## 2019-04-30 RX ADMIN — CALCIUM CARBONATE-VITAMIN D TAB 500 MG-200 UNIT 1 TABLET: 500-200 TAB at 08:02

## 2019-04-30 RX ADMIN — CEPHALEXIN 500 MG: 500 CAPSULE ORAL at 08:02

## 2019-04-30 RX ADMIN — WHITE PETROLATUM 57.7 %-MINERAL OIL 31.9 % EYE OINTMENT: at 22:05

## 2019-05-01 ENCOUNTER — HOSPITAL ENCOUNTER (INPATIENT)
Facility: HOSPITAL | Age: 74
LOS: 2 days | Discharge: HOME | DRG: 092 | End: 2019-05-03
Attending: FAMILY MEDICINE | Admitting: FAMILY MEDICINE
Payer: COMMERCIAL

## 2019-05-01 VITALS
SYSTOLIC BLOOD PRESSURE: 128 MMHG | TEMPERATURE: 98.4 F | WEIGHT: 126.76 LBS | DIASTOLIC BLOOD PRESSURE: 55 MMHG | RESPIRATION RATE: 20 BRPM | OXYGEN SATURATION: 98 % | HEART RATE: 80 BPM

## 2019-05-01 DIAGNOSIS — G31.09 FRONTOTEMPORAL DEMENTIA WITHOUT BEHAVIORAL DISTURBANCE (HCC): Chronic | ICD-10-CM

## 2019-05-01 DIAGNOSIS — I10 BENIGN ESSENTIAL HYPERTENSION: Primary | Chronic | ICD-10-CM

## 2019-05-01 DIAGNOSIS — F02.80 FRONTOTEMPORAL DEMENTIA WITHOUT BEHAVIORAL DISTURBANCE (HCC): Chronic | ICD-10-CM

## 2019-05-01 PROBLEM — M25.562 LEFT KNEE PAIN: Status: ACTIVE | Noted: 2019-05-01

## 2019-05-01 PROCEDURE — 99306 1ST NF CARE HIGH MDM 50: CPT | Performed by: INTERNAL MEDICINE

## 2019-05-01 PROCEDURE — 87081 CULTURE SCREEN ONLY: CPT | Performed by: INTERNAL MEDICINE

## 2019-05-01 PROCEDURE — 99239 HOSP IP/OBS DSCHRG MGMT >30: CPT | Performed by: INTERNAL MEDICINE

## 2019-05-01 RX ORDER — LISINOPRIL 5 MG/1
5 TABLET ORAL DAILY
Status: DISCONTINUED | OUTPATIENT
Start: 2019-05-02 | End: 2019-05-03 | Stop reason: HOSPADM

## 2019-05-01 RX ORDER — CEPHALEXIN 500 MG/1
500 CAPSULE ORAL EVERY 12 HOURS SCHEDULED
Qty: 1 CAPSULE | Refills: 0 | Status: SHIPPED | OUTPATIENT
Start: 2019-05-01 | End: 2019-05-03 | Stop reason: HOSPADM

## 2019-05-01 RX ORDER — SENNOSIDES 8.6 MG
1 TABLET ORAL
Status: DISCONTINUED | OUTPATIENT
Start: 2019-05-01 | End: 2019-05-03 | Stop reason: HOSPADM

## 2019-05-01 RX ORDER — LACTOSE-REDUCED FOOD
8 LIQUID (ML) ORAL 2 TIMES DAILY
Status: DISCONTINUED | OUTPATIENT
Start: 2019-05-01 | End: 2019-05-02

## 2019-05-01 RX ORDER — LANOLIN ALCOHOL/MO/W.PET/CERES
1000 CREAM (GRAM) TOPICAL DAILY
Status: DISCONTINUED | OUTPATIENT
Start: 2019-05-02 | End: 2019-05-03 | Stop reason: HOSPADM

## 2019-05-01 RX ORDER — LIDOCAINE 50 MG/G
1 PATCH TOPICAL DAILY
Status: DISCONTINUED | OUTPATIENT
Start: 2019-05-02 | End: 2019-05-03 | Stop reason: HOSPADM

## 2019-05-01 RX ORDER — B-COMPLEX WITH VITAMIN C
1 TABLET ORAL
Status: DISCONTINUED | OUTPATIENT
Start: 2019-05-02 | End: 2019-05-03 | Stop reason: HOSPADM

## 2019-05-01 RX ORDER — DOCUSATE SODIUM 100 MG/1
100 CAPSULE, LIQUID FILLED ORAL DAILY
Status: DISCONTINUED | OUTPATIENT
Start: 2019-05-02 | End: 2019-05-03 | Stop reason: HOSPADM

## 2019-05-01 RX ORDER — ACETAMINOPHEN 325 MG/1
650 TABLET ORAL EVERY 6 HOURS PRN
Status: DISCONTINUED | OUTPATIENT
Start: 2019-05-01 | End: 2019-05-03 | Stop reason: HOSPADM

## 2019-05-01 RX ORDER — RIVASTIGMINE TARTRATE 1.5 MG/1
4.5 CAPSULE ORAL DAILY
Status: DISCONTINUED | OUTPATIENT
Start: 2019-05-02 | End: 2019-05-03 | Stop reason: HOSPADM

## 2019-05-01 RX ORDER — MINERAL OIL AND PETROLATUM 150; 830 MG/G; MG/G
OINTMENT OPHTHALMIC
Status: DISCONTINUED | OUTPATIENT
Start: 2019-05-01 | End: 2019-05-03 | Stop reason: HOSPADM

## 2019-05-01 RX ORDER — CEPHALEXIN 500 MG/1
500 CAPSULE ORAL EVERY 12 HOURS SCHEDULED
Status: COMPLETED | OUTPATIENT
Start: 2019-05-01 | End: 2019-05-02

## 2019-05-01 RX ADMIN — TUBERCULIN PURIFIED PROTEIN DERIVATIVE 5 UNITS: 5 INJECTION INTRADERMAL at 20:47

## 2019-05-01 RX ADMIN — CEPHALEXIN 500 MG: 500 CAPSULE ORAL at 08:17

## 2019-05-01 RX ADMIN — CEPHALEXIN 500 MG: 500 CAPSULE ORAL at 22:25

## 2019-05-01 RX ADMIN — LISINOPRIL 5 MG: 5 TABLET ORAL at 08:16

## 2019-05-01 RX ADMIN — WHITE PETROLATUM 57.7 %-MINERAL OIL 31.9 % EYE OINTMENT: at 22:25

## 2019-05-01 RX ADMIN — CYANOCOBALAMIN TAB 500 MCG 1000 MCG: 500 TAB at 08:17

## 2019-05-01 RX ADMIN — DOCUSATE SODIUM 100 MG: 100 CAPSULE, LIQUID FILLED ORAL at 08:17

## 2019-05-01 RX ADMIN — ENOXAPARIN SODIUM 40 MG: 40 INJECTION SUBCUTANEOUS at 08:17

## 2019-05-01 RX ADMIN — CALCIUM CARBONATE-VITAMIN D TAB 500 MG-200 UNIT 1 TABLET: 500-200 TAB at 08:17

## 2019-05-01 RX ADMIN — RIVASTIGMINE TARTRATE 4.5 MG: 3 CAPSULE ORAL at 08:17

## 2019-05-01 RX ADMIN — Medication 1 TABLET: at 08:17

## 2019-05-01 RX ADMIN — LIDOCAINE 1 PATCH: 50 PATCH TOPICAL at 08:16

## 2019-05-02 LAB — PLATELET # BLD AUTO: 329 THOUSANDS/UL (ref 150–450)

## 2019-05-02 PROCEDURE — 97163 PT EVAL HIGH COMPLEX 45 MIN: CPT

## 2019-05-02 PROCEDURE — 85049 AUTOMATED PLATELET COUNT: CPT | Performed by: FAMILY MEDICINE

## 2019-05-02 PROCEDURE — 97530 THERAPEUTIC ACTIVITIES: CPT

## 2019-05-02 PROCEDURE — 97116 GAIT TRAINING THERAPY: CPT

## 2019-05-02 PROCEDURE — 97166 OT EVAL MOD COMPLEX 45 MIN: CPT

## 2019-05-02 RX ADMIN — CALCIUM CARBONATE-VITAMIN D TAB 500 MG-200 UNIT 1 TABLET: 500-200 TAB at 09:07

## 2019-05-02 RX ADMIN — Medication 1 TABLET: at 09:07

## 2019-05-02 RX ADMIN — WHITE PETROLATUM 57.7 %-MINERAL OIL 31.9 % EYE OINTMENT: at 21:56

## 2019-05-02 RX ADMIN — CEPHALEXIN 500 MG: 500 CAPSULE ORAL at 09:07

## 2019-05-02 RX ADMIN — ENOXAPARIN SODIUM 40 MG: 40 INJECTION SUBCUTANEOUS at 09:05

## 2019-05-02 RX ADMIN — DOCUSATE SODIUM 100 MG: 100 CAPSULE, LIQUID FILLED ORAL at 09:07

## 2019-05-02 RX ADMIN — RIVASTIGMINE TARTRATE 4.5 MG: 1.5 CAPSULE ORAL at 09:06

## 2019-05-02 RX ADMIN — CYANOCOBALAMIN TAB 1000 MCG 1000 MCG: 1000 TAB at 09:07

## 2019-05-02 RX ADMIN — LIDOCAINE 1 PATCH: 50 PATCH TOPICAL at 09:07

## 2019-05-02 RX ADMIN — LISINOPRIL 5 MG: 5 TABLET ORAL at 09:06

## 2019-05-03 VITALS
RESPIRATION RATE: 16 BRPM | OXYGEN SATURATION: 97 % | HEART RATE: 81 BPM | SYSTOLIC BLOOD PRESSURE: 121 MMHG | DIASTOLIC BLOOD PRESSURE: 58 MMHG | BODY MASS INDEX: 24.62 KG/M2 | HEIGHT: 59 IN | TEMPERATURE: 97.5 F | WEIGHT: 122.14 LBS

## 2019-05-03 LAB — MRSA NOSE QL CULT: NORMAL

## 2019-05-03 PROCEDURE — 99316 NF DSCHRG MGMT 30 MIN+: CPT | Performed by: FAMILY MEDICINE

## 2019-05-03 PROCEDURE — 97530 THERAPEUTIC ACTIVITIES: CPT

## 2019-05-03 PROCEDURE — 97116 GAIT TRAINING THERAPY: CPT

## 2019-05-03 RX ORDER — RIVASTIGMINE TARTRATE 4.5 MG/1
4.5 CAPSULE ORAL DAILY
Qty: 30 CAPSULE | Refills: 0 | Status: SHIPPED | OUTPATIENT
Start: 2019-05-03

## 2019-05-03 RX ORDER — LACTOSE-REDUCED FOOD
8 LIQUID (ML) ORAL 2 TIMES DAILY
Qty: 60 CAN | Refills: 0 | Status: SHIPPED | OUTPATIENT
Start: 2019-05-03 | End: 2020-09-21 | Stop reason: ALTCHOICE

## 2019-05-03 RX ORDER — MINERAL OIL AND PETROLATUM 150; 830 MG/G; MG/G
OINTMENT OPHTHALMIC
Qty: 1 TUBE | Refills: 0 | Status: SHIPPED | OUTPATIENT
Start: 2019-05-03

## 2019-05-03 RX ORDER — LISINOPRIL 5 MG/1
5 TABLET ORAL DAILY
Qty: 30 TABLET | Refills: 0 | Status: SHIPPED | OUTPATIENT
Start: 2019-05-03 | End: 2020-12-31

## 2019-05-03 RX ORDER — DOCUSATE SODIUM 100 MG/1
100 CAPSULE, LIQUID FILLED ORAL DAILY
Qty: 30 CAPSULE | Refills: 0 | Status: SHIPPED | OUTPATIENT
Start: 2019-05-03

## 2019-05-03 RX ORDER — B-COMPLEX WITH VITAMIN C
1 TABLET ORAL
Qty: 30 TABLET | Refills: 0 | Status: SHIPPED | OUTPATIENT
Start: 2019-05-04

## 2019-05-03 RX ORDER — ACETAMINOPHEN 325 MG/1
650 TABLET ORAL EVERY 6 HOURS PRN
Qty: 30 TABLET | Refills: 0 | Status: SHIPPED | OUTPATIENT
Start: 2019-05-03

## 2019-05-03 RX ADMIN — LIDOCAINE 1 PATCH: 50 PATCH TOPICAL at 08:01

## 2019-05-03 RX ADMIN — RIVASTIGMINE TARTRATE 4.5 MG: 1.5 CAPSULE ORAL at 08:00

## 2019-05-03 RX ADMIN — CYANOCOBALAMIN TAB 1000 MCG 1000 MCG: 1000 TAB at 08:02

## 2019-05-03 RX ADMIN — CALCIUM CARBONATE-VITAMIN D TAB 500 MG-200 UNIT 1 TABLET: 500-200 TAB at 07:58

## 2019-05-03 RX ADMIN — Medication 1 TABLET: at 08:01

## 2019-05-03 RX ADMIN — DOCUSATE SODIUM 100 MG: 100 CAPSULE, LIQUID FILLED ORAL at 08:01

## 2019-05-03 RX ADMIN — ENOXAPARIN SODIUM 40 MG: 40 INJECTION SUBCUTANEOUS at 08:01

## 2019-05-03 RX ADMIN — LISINOPRIL 5 MG: 5 TABLET ORAL at 08:04

## 2019-07-17 ENCOUNTER — HOSPITAL ENCOUNTER (OUTPATIENT)
Facility: HOSPITAL | Age: 74
Setting detail: OBSERVATION
Discharge: NURSING, REHAB OR LONG-TERM CARE | End: 2019-07-24
Attending: EMERGENCY MEDICINE | Admitting: INTERNAL MEDICINE
Payer: COMMERCIAL

## 2019-07-17 ENCOUNTER — APPOINTMENT (EMERGENCY)
Dept: CT IMAGING | Facility: HOSPITAL | Age: 74
End: 2019-07-17
Payer: COMMERCIAL
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
## 2019-07-17 DIAGNOSIS — F02.80 FRONTOTEMPORAL DEMENTIA WITHOUT BEHAVIORAL DISTURBANCE (HCC): Chronic | ICD-10-CM

## 2019-07-17 DIAGNOSIS — G31.09 FRONTOTEMPORAL DEMENTIA WITHOUT BEHAVIORAL DISTURBANCE (HCC): Chronic | ICD-10-CM

## 2019-07-17 DIAGNOSIS — F03.90 DEMENTIA (HCC): ICD-10-CM

## 2019-07-17 PROBLEM — R26.2 AMBULATORY DYSFUNCTION: Status: ACTIVE | Noted: 2019-07-17

## 2019-07-17 LAB
ANION GAP SERPL CALCULATED.3IONS-SCNC: 7 MMOL/L (ref 4–13)
BASOPHILS # BLD AUTO: 0.05 THOUSANDS/ΜL (ref 0–0.1)
BASOPHILS NFR BLD AUTO: 1 % (ref 0–1)
BUN SERPL-MCNC: 27 MG/DL (ref 5–25)
CALCIUM SERPL-MCNC: 9.5 MG/DL (ref 8.3–10.1)
CHLORIDE SERPL-SCNC: 105 MMOL/L (ref 100–108)
CO2 SERPL-SCNC: 29 MMOL/L (ref 21–32)
CREAT SERPL-MCNC: 0.68 MG/DL (ref 0.6–1.3)
EOSINOPHIL # BLD AUTO: 0.1 THOUSAND/ΜL (ref 0–0.61)
EOSINOPHIL NFR BLD AUTO: 1 % (ref 0–6)
ERYTHROCYTE [DISTWIDTH] IN BLOOD BY AUTOMATED COUNT: 12.9 % (ref 11.6–15.1)
GFR SERPL CREATININE-BSD FRML MDRD: 86 ML/MIN/1.73SQ M
GLUCOSE SERPL-MCNC: 105 MG/DL (ref 65–140)
GLUCOSE SERPL-MCNC: 153 MG/DL (ref 65–140)
HCT VFR BLD AUTO: 36.4 % (ref 34.8–46.1)
HGB BLD-MCNC: 11.7 G/DL (ref 11.5–15.4)
IMM GRANULOCYTES # BLD AUTO: 0.05 THOUSAND/UL (ref 0–0.2)
IMM GRANULOCYTES NFR BLD AUTO: 1 % (ref 0–2)
LYMPHOCYTES # BLD AUTO: 1.74 THOUSANDS/ΜL (ref 0.6–4.47)
LYMPHOCYTES NFR BLD AUTO: 18 % (ref 14–44)
MCH RBC QN AUTO: 29.3 PG (ref 26.8–34.3)
MCHC RBC AUTO-ENTMCNC: 32.1 G/DL (ref 31.4–37.4)
MCV RBC AUTO: 91 FL (ref 82–98)
MONOCYTES # BLD AUTO: 0.75 THOUSAND/ΜL (ref 0.17–1.22)
MONOCYTES NFR BLD AUTO: 8 % (ref 4–12)
NEUTROPHILS # BLD AUTO: 7.03 THOUSANDS/ΜL (ref 1.85–7.62)
NEUTS SEG NFR BLD AUTO: 71 % (ref 43–75)
NRBC BLD AUTO-RTO: 0 /100 WBCS
PLATELET # BLD AUTO: 441 THOUSANDS/UL (ref 149–390)
PMV BLD AUTO: 9.3 FL (ref 8.9–12.7)
POTASSIUM SERPL-SCNC: 3.5 MMOL/L (ref 3.5–5.3)
RBC # BLD AUTO: 4 MILLION/UL (ref 3.81–5.12)
SODIUM SERPL-SCNC: 141 MMOL/L (ref 136–145)
WBC # BLD AUTO: 9.72 THOUSAND/UL (ref 4.31–10.16)

## 2019-07-17 PROCEDURE — 80048 BASIC METABOLIC PNL TOTAL CA: CPT | Performed by: EMERGENCY MEDICINE

## 2019-07-17 PROCEDURE — 82948 REAGENT STRIP/BLOOD GLUCOSE: CPT

## 2019-07-17 PROCEDURE — 36415 COLL VENOUS BLD VENIPUNCTURE: CPT | Performed by: EMERGENCY MEDICINE

## 2019-07-17 PROCEDURE — 70450 CT HEAD/BRAIN W/O DYE: CPT

## 2019-07-17 PROCEDURE — 99285 EMERGENCY DEPT VISIT HI MDM: CPT

## 2019-07-17 PROCEDURE — 99220 PR INITIAL OBSERVATION CARE/DAY 70 MINUTES: CPT | Performed by: PHYSICIAN ASSISTANT

## 2019-07-17 PROCEDURE — 99284 EMERGENCY DEPT VISIT MOD MDM: CPT | Performed by: EMERGENCY MEDICINE

## 2019-07-17 PROCEDURE — 85025 COMPLETE CBC W/AUTO DIFF WBC: CPT | Performed by: EMERGENCY MEDICINE

## 2019-07-17 RX ORDER — LACTOSE-REDUCED FOOD
8 LIQUID (ML) ORAL 2 TIMES DAILY
Status: DISCONTINUED | OUTPATIENT
Start: 2019-07-17 | End: 2019-07-24 | Stop reason: HOSPADM

## 2019-07-17 RX ORDER — MINERAL OIL AND PETROLATUM 150; 830 MG/G; MG/G
OINTMENT OPHTHALMIC
Status: DISCONTINUED | OUTPATIENT
Start: 2019-07-17 | End: 2019-07-24 | Stop reason: HOSPADM

## 2019-07-17 RX ORDER — LISINOPRIL 5 MG/1
5 TABLET ORAL DAILY
Status: DISCONTINUED | OUTPATIENT
Start: 2019-07-18 | End: 2019-07-24 | Stop reason: HOSPADM

## 2019-07-17 RX ORDER — ACETAMINOPHEN 325 MG/1
650 TABLET ORAL EVERY 6 HOURS PRN
Status: DISCONTINUED | OUTPATIENT
Start: 2019-07-17 | End: 2019-07-24 | Stop reason: HOSPADM

## 2019-07-17 RX ADMIN — WHITE PETROLATUM 57.7 %-MINERAL OIL 31.9 % EYE OINTMENT: at 23:12

## 2019-07-17 NOTE — ED NOTES
Patient transported to [id number] [place] [address]  07/17/19 [address] [facility]

## 2019-07-17 NOTE — ED NOTES
Pt rang call bell and stated, "I peed again " Pt changed, wiped down, and new pamper placed  Asked if Pt could void anymore and Pt stated, "No, I'm done " RN aware of Pt's significant urine output        Francis Hickman  61/95/01 5542

## 2019-07-17 NOTE — ED PROVIDER NOTES
History  Chief Complaint   Patient presents with   Blaine Nagy Fall     pt arrives from [facility] and has had multiple falls in the last few days  was evaluated yesterday at [facility] after a fall   had another fall today  denies LOC or thinners  pts states just tripped but hx of dementia and stroke  pt offers no complaints at this time  there is old brusing to right side of face  68-year-old female transferred from [facility] facility  After speaking with them they indicate that the patient fell a total of 10 times the past 4 days, had a fall which was witnessed on July 13th where she was standing and fell backwards  Hit her head in her hand  She was seen at [facility] on July 13th, CBC and CMP were unremarkable, head CT maxillofacial CT were unremarkable  Right hand x-ray and chest x-ray are unremarkable  She was discharged back but she has had additional falls  Most recent fall was today which was unwitnessed, staff heard her fall, went in and found her on her back  They indicate that with the increased falls she is unsafe to return to their facility  the patient has a history of dementia, at her baseline, cannot continue to history of present illness or review systems well other than she tells me she has no complaints  She denies headache, denies chest pain, no shortness of breath, denies blurred vision, denies pain anywhere  Prior to Admission Medications   Prescriptions Last Dose Informant Patient Reported? Taking?    Nutritional Supplements (ENSURE ACTIVE HIGH PROTEIN) LIQD   No Yes   Sig: Take 8 oz by mouth 2 (two) times a day   acetaminophen (TYLENOL) 325 mg tablet   No Yes   Sig: Take 2 tablets (650 mg total) by mouth every 6 (six) hours as needed for mild pain, moderate pain, headaches or fever   artificial tear (LUBRIFRESH P M ) 83-15 % ophthalmic ointment   No Yes   Sig: Administer to both eyes daily at bedtime   calcium carbonate-vitamin D (OSCAL-D) 500 mg-200 units per tablet   No Yes   Sig: Take 1 tablet by mouth daily with breakfast   cyanocobalamin 1000 MCG tablet   No Yes   Sig: Take 1 tablet (1,000 mcg total) by mouth daily   docusate sodium (COLACE) 100 mg capsule   No Yes   Sig: Take 1 capsule (100 mg total) by mouth daily   lisinopril (ZESTRIL) 5 mg tablet   No Yes   Sig: Take 1 tablet (5 mg total) by mouth daily   rivastigmine (EXELON) 4 5 MG capsule   No Yes   Sig: Take 1 capsule (4 5 mg total) by mouth daily      Facility-Administered Medications: None       Past Medical History:   Diagnosis Date    Dementia     Diabetes mellitus ([facility] [address] )     Expressive aphasia     Hyperlipidemia     Hypertension     Impaired fasting glucose     Osteoarthritis     Vitamin D deficiency        Past Surgical History:   Procedure Laterality Date    JOINT REPLACEMENT      right knee       History reviewed  No pertinent family history  I have reviewed and agree with the history as documented  Social History     Tobacco Use    Smoking status: Never Smoker    Smokeless tobacco: Never Used   Substance Use Topics    Alcohol use: Never     Frequency: Never    Drug use: Never        Review of Systems   Unable to perform ROS: Dementia       Physical Exam  Physical Exam   Constitutional: No distress  HENT:   Head: Normocephalic  Head is without raccoon's eyes, without Rivera's sign, without abrasion and without contusion  Right Ear: External ear normal    Left Ear: External ear normal    Nose: No sinus tenderness or nasal deformity  She has some mild facial ecchymosis which appears to be old, there is no craniofacial instability, no dental malocclusion   Eyes: Pupils are equal, round, and reactive to light  Conjunctivae and EOM are normal    Neck: Normal range of motion  No spinous process tenderness present  Cardiovascular: Regular rhythm and normal heart sounds  Pulmonary/Chest: Breath sounds normal  No respiratory distress  She has no wheezes  She has no rales     Abdominal: Soft  Bowel sounds are normal  She exhibits no distension  There is no tenderness  There is no rebound and no guarding  Musculoskeletal:   The back of her right hand has some right abrasion  There is no tenderness  The patient can flex and extend well at the MCP, DIP and PIP joints in all 4 fingers of the right hand and the MCP and the IP joint in the right thumb  There are no sensory deficits in the hand, including over the thumb or palm of the hand  The rest of the right arm in the other 3 extremities are atraumatic, nontender with normal, painless range of motion  No midline cervical, thoracic, lumbar, sacral tenderness, deformities, or step-offs  Neurological: She is alert  The patient is oriented to person, disoriented to time and recent events and place  There is no definite finger-to-nose testing, no pronator drift, slurred speech or facial droop  She is pleasant and cooperative  Strength is 5/5 in the bilateral upper and lower extremities  There is normal sensation intact throughout the entire body  Skin: Skin is warm and dry     Psychiatric: Her behavior is normal        Vital Signs  ED Triage Vitals   Temperature Pulse Respirations Blood Pressure SpO2   07/17/19 1746 07/17/19 1746 07/17/19 1746 07/17/19 1746 07/17/19 1746   98 9 °F (37 2 °C) 95 16 156/86 98 %      Temp Source Heart Rate Source Patient Position - Orthostatic VS BP Location FiO2 (%)   07/17/19 1746 07/17/19 1907 07/17/19 1907 07/17/19 1907 --   Oral Monitor Lying Right arm       Pain Score       07/17/19 1746       No Pain           Vitals:    07/17/19 1746 07/17/19 1907 07/17/19 2050   BP: 156/86 154/67 138/74   Pulse: 95 89 83   Patient Position - Orthostatic VS:  Lying Lying         Visual Acuity      ED Medications  Medications   acetaminophen (TYLENOL) tablet 650 mg (has no administration in time range)   artificial tear (LUBRIFRESH P M ) ophthalmic ointment (has no administration in time range)   lisinopril (ZESTRIL) tablet 5 mg (has no administration in time range)   ENSURE ACTIVE HIGH PROTEIN LIQD 8 oz (has no administration in time range)   rivastigmine (EXELON) capsule 4 5 mg (has no administration in time range)   enoxaparin (LOVENOX) subcutaneous injection 40 mg (has no administration in time range)       Diagnostic Studies  Results Reviewed     Procedure Component Value Units Date/Time    Basic metabolic panel [[id number]]  (Abnormal) Collected:  07/17/19 1840    Lab Status:  Final result Specimen:  Blood from Arm, Left Updated:  07/17/19 1857     Sodium 141 mmol/L      Potassium 3 5 mmol/L      Chloride 105 mmol/L      CO2 29 mmol/L      ANION GAP 7 mmol/L      BUN 27 mg/dL      Creatinine 0 68 mg/dL      Glucose 105 mg/dL      Calcium 9 5 mg/dL      eGFR 86 ml/min/1 73sq m     Narrative:       [place] guidelines for Chronic Kidney Disease (CKD):     Stage 1 with normal or high GFR (GFR > 90 mL/min/1 73 square meters)    Stage 2 Mild CKD (GFR = 60-89 mL/min/1 73 square meters)    Stage 3A Moderate CKD (GFR = 45-59 mL/min/1 73 square meters)    Stage 3B Moderate CKD (GFR = 30-44 mL/min/1 73 square meters)    Stage 4 Severe CKD (GFR = 15-29 mL/min/1 73 square meters)    Stage 5 End Stage CKD (GFR <15 mL/min/1 73 square meters)  Note: GFR calculation is accurate only with a steady state creatinine    CBC and differential [[id number]]  (Abnormal) Collected:  07/17/19 1840    Lab Status:  Final result Specimen:  Blood from Arm, Left Updated:  07/17/19 1853     WBC 9 72 Thousand/uL      RBC 4 00 Million/uL      Hemoglobin 11 7 g/dL      Hematocrit 36 4 %      MCV 91 fL      MCH 29 3 pg      MCHC 32 1 g/dL      RDW 12 9 %      MPV 9 3 fL      Platelets 032 Thousands/uL      nRBC 0 /100 WBCs      Neutrophils Relative 71 %      Immat GRANS % 1 %      Lymphocytes Relative 18 %      Monocytes Relative 8 %      Eosinophils Relative 1 %      Basophils Relative 1 %      Neutrophils Absolute 7 03 Thousands/µL      Immature Grans Absolute 0 05 Thousand/uL      Lymphocytes Absolute 1 74 Thousands/µL      Monocytes Absolute 0 75 Thousand/µL      Eosinophils Absolute 0 10 Thousand/µL      Basophils Absolute 0 05 Thousands/µL     UA w Reflex to Microscopic w Reflex to Culture [[id number]]     Lab Status:  No result Specimen:  Urine, Straight Cath                  CT head without contrast   Final Result by Ammy Dickson DO (07/17 1918)   Stable ventriculomegaly out of proportion to the degree of sulcal dilatation  Normal pressure hydrocephalus is again not excluded by imaging  There are again low lying cerebellar tonsils which enter the foramen magnum crowding the cervical cord and    partially effacing the CSF spaces  No intracranial hemorrhage or territorial infarct is found  Workstation performed: [id number]                    Procedures  Procedures       ED Course                               MDM  Number of Diagnoses or Management Options  Dementia:   Fall:   Diagnosis management comments: On reassessment there was no change to the above findings  Patient had urinated into her briefs before nursing was able to obtain a straight cath urine sample  At this point a urine sample is currently pending  Patient is overall well appearing, no signs of significant traumatic injury on examination or imaging  She is however, unable was returned back to her facility  Because of this I spoke with the admitting JOE who accept the patient for admission         Amount and/or Complexity of Data Reviewed  Clinical lab tests: ordered and reviewed  Tests in the radiology section of CPT®: ordered and reviewed  Tests in the medicine section of CPT®: ordered and reviewed        Disposition  Final diagnoses:   Fall   Dementia     Time reflects when diagnosis was documented in both MDM as applicable and the Disposition within this note     Time User Action Codes Description Comment    7/17/2019  8:23 PM Juan A Melissa Mariel [[id number]] Fall     7/17/2019  8:23 PM Betty Whatley Add [[id number]] Dementia       ED Disposition     ED Disposition Condition Date/Time Comment    Admit Stable Wed Jul 17, 2019  8:23 PM Case was discussed with Layla Farias and the patient's admission status was agreed to be Admission Status: inpatient status to the service of Dr Philip Trimble           Follow-up Information    None         Current Discharge Medication List      CONTINUE these medications which have NOT CHANGED    Details   acetaminophen (TYLENOL) 325 mg tablet Take 2 tablets (650 mg total) by mouth every 6 (six) hours as needed for mild pain, moderate pain, headaches or fever  Qty: 30 tablet, Refills: 0    Associated Diagnoses: Frontotemporal dementia without behavioral disturbance      artificial tear (LUBRIFRESH P M ) 83-15 % ophthalmic ointment Administer to both eyes daily at bedtime  Qty: 1 Tube, Refills: 0    Associated Diagnoses: Frontotemporal dementia without behavioral disturbance      calcium carbonate-vitamin D (OSCAL-D) 500 mg-200 units per tablet Take 1 tablet by mouth daily with breakfast  Qty: 30 tablet, Refills: 0    Associated Diagnoses: Frontotemporal dementia without behavioral disturbance      cyanocobalamin 1000 MCG tablet Take 1 tablet (1,000 mcg total) by mouth daily  Qty: 30 tablet, Refills: 0    Associated Diagnoses: Frontotemporal dementia without behavioral disturbance      docusate sodium (COLACE) 100 mg capsule Take 1 capsule (100 mg total) by mouth daily  Qty: 30 capsule, Refills: 0    Associated Diagnoses: Frontotemporal dementia without behavioral disturbance      lisinopril (ZESTRIL) 5 mg tablet Take 1 tablet (5 mg total) by mouth daily  Qty: 30 tablet, Refills: 0    Associated Diagnoses: Benign essential hypertension      Nutritional Supplements (ENSURE ACTIVE HIGH PROTEIN) LIQD Take 8 oz by mouth 2 (two) times a day  Qty: 60 Can, Refills: 0    Associated Diagnoses: Frontotemporal dementia without behavioral disturbance      rivastigmine (EXELON) 4 5 MG capsule Take 1 capsule (4 5 mg total) by mouth daily  Qty: 30 capsule, Refills: 0    Associated Diagnoses: Frontotemporal dementia without behavioral disturbance           No discharge procedures on file      ED Provider  Electronically Signed by           Cora Crouch  07/17/19 5747

## 2019-07-17 NOTE — ED NOTES
Pt rang call bell and pointed at self  Asked if Pt needed to void; Pt said yes  Found a saturated pamper on Pt and removed it  Pt placed on bedpan; unable to void anymore  Pt was cleaned up and new pamper placed on Pt        Makenzie Cardozo  67/47/25 1829

## 2019-07-18 ENCOUNTER — APPOINTMENT (OUTPATIENT)
Dept: RADIOLOGY | Facility: HOSPITAL | Age: 74
End: 2019-07-18
Payer: COMMERCIAL

## 2019-07-18 LAB
ANION GAP SERPL CALCULATED.3IONS-SCNC: 6 MMOL/L (ref 4–13)
BASOPHILS # BLD AUTO: 0.05 THOUSANDS/ΜL (ref 0–0.1)
BASOPHILS NFR BLD AUTO: 1 % (ref 0–1)
BILIRUB UR QL STRIP: NEGATIVE
BUN SERPL-MCNC: 21 MG/DL (ref 5–25)
CALCIUM SERPL-MCNC: 8.9 MG/DL (ref 8.3–10.1)
CHLORIDE SERPL-SCNC: 109 MMOL/L (ref 100–108)
CLARITY UR: CLEAR
CO2 SERPL-SCNC: 27 MMOL/L (ref 21–32)
COLOR UR: YELLOW
CREAT SERPL-MCNC: 0.59 MG/DL (ref 0.6–1.3)
EOSINOPHIL # BLD AUTO: 0.21 THOUSAND/ΜL (ref 0–0.61)
EOSINOPHIL NFR BLD AUTO: 4 % (ref 0–6)
ERYTHROCYTE [DISTWIDTH] IN BLOOD BY AUTOMATED COUNT: 13.2 % (ref 11.6–15.1)
GFR SERPL CREATININE-BSD FRML MDRD: 90 ML/MIN/1.73SQ M
GLUCOSE SERPL-MCNC: 94 MG/DL (ref 65–140)
GLUCOSE UR STRIP-MCNC: NEGATIVE MG/DL
HCT VFR BLD AUTO: 33.2 % (ref 34.8–46.1)
HGB BLD-MCNC: 10.4 G/DL (ref 11.5–15.4)
HGB UR QL STRIP.AUTO: NEGATIVE
IMM GRANULOCYTES # BLD AUTO: 0.02 THOUSAND/UL (ref 0–0.2)
IMM GRANULOCYTES NFR BLD AUTO: 0 % (ref 0–2)
KETONES UR STRIP-MCNC: NEGATIVE MG/DL
LEUKOCYTE ESTERASE UR QL STRIP: NEGATIVE
LYMPHOCYTES # BLD AUTO: 1.55 THOUSANDS/ΜL (ref 0.6–4.47)
LYMPHOCYTES NFR BLD AUTO: 28 % (ref 14–44)
MCH RBC QN AUTO: 28.6 PG (ref 26.8–34.3)
MCHC RBC AUTO-ENTMCNC: 31.3 G/DL (ref 31.4–37.4)
MCV RBC AUTO: 91 FL (ref 82–98)
MONOCYTES # BLD AUTO: 0.52 THOUSAND/ΜL (ref 0.17–1.22)
MONOCYTES NFR BLD AUTO: 9 % (ref 4–12)
NEUTROPHILS # BLD AUTO: 3.21 THOUSANDS/ΜL (ref 1.85–7.62)
NEUTS SEG NFR BLD AUTO: 58 % (ref 43–75)
NITRITE UR QL STRIP: NEGATIVE
NRBC BLD AUTO-RTO: 0 /100 WBCS
PH UR STRIP.AUTO: 7 [PH]
PLATELET # BLD AUTO: 389 THOUSANDS/UL (ref 149–390)
PMV BLD AUTO: 9.4 FL (ref 8.9–12.7)
POTASSIUM SERPL-SCNC: 3.8 MMOL/L (ref 3.5–5.3)
PROT UR STRIP-MCNC: NEGATIVE MG/DL
RBC # BLD AUTO: 3.64 MILLION/UL (ref 3.81–5.12)
SODIUM SERPL-SCNC: 142 MMOL/L (ref 136–145)
SP GR UR STRIP.AUTO: 1.02 (ref 1–1.03)
TSH SERPL DL<=0.05 MIU/L-ACNC: 0.84 UIU/ML (ref 0.36–3.74)
UROBILINOGEN UR QL STRIP.AUTO: 0.2 E.U./DL
WBC # BLD AUTO: 5.56 THOUSAND/UL (ref 4.31–10.16)

## 2019-07-18 PROCEDURE — 80048 BASIC METABOLIC PNL TOTAL CA: CPT | Performed by: PHYSICIAN ASSISTANT

## 2019-07-18 PROCEDURE — G8979 MOBILITY GOAL STATUS: HCPCS

## 2019-07-18 PROCEDURE — 99225 PR SBSQ OBSERVATION CARE/DAY 25 MINUTES: CPT | Performed by: INTERNAL MEDICINE

## 2019-07-18 PROCEDURE — 84443 ASSAY THYROID STIM HORMONE: CPT | Performed by: PHYSICIAN ASSISTANT

## 2019-07-18 PROCEDURE — 85025 COMPLETE CBC W/AUTO DIFF WBC: CPT | Performed by: PHYSICIAN ASSISTANT

## 2019-07-18 PROCEDURE — 81003 URINALYSIS AUTO W/O SCOPE: CPT | Performed by: PHYSICIAN ASSISTANT

## 2019-07-18 PROCEDURE — 97163 PT EVAL HIGH COMPLEX 45 MIN: CPT

## 2019-07-18 PROCEDURE — 73030 X-RAY EXAM OF SHOULDER: CPT

## 2019-07-18 PROCEDURE — G8978 MOBILITY CURRENT STATUS: HCPCS

## 2019-07-18 RX ADMIN — ENOXAPARIN SODIUM 40 MG: 40 INJECTION SUBCUTANEOUS at 08:56

## 2019-07-18 RX ADMIN — Medication 8 OZ: at 12:19

## 2019-07-18 RX ADMIN — RIVASTIGMINE TARTRATE 4.5 MG: 3 CAPSULE ORAL at 08:56

## 2019-07-18 RX ADMIN — WHITE PETROLATUM 57.7 %-MINERAL OIL 31.9 % EYE OINTMENT: at 21:27

## 2019-07-18 RX ADMIN — LISINOPRIL 5 MG: 5 TABLET ORAL at 08:55

## 2019-07-18 RX ADMIN — Medication 8 OZ: at 18:07

## 2019-07-18 NOTE — PROGRESS NOTES
Currently, Ensure high protein shakes are ordered as a medication, not a supplement  Ensure high protein shakes are not on our hospital formulary  Will add Glucerna chocolate shakes at B/D which most closely resembles nutritionals of the Ensure high protein shakes  Please discontinue the Ensure high protein shake order under medications  We will continue to follow during her hospital stay

## 2019-07-18 NOTE — UTILIZATION REVIEW
Initial Clinical Review    Admission: Date/Time/Statement: OBS 7/17 @ 2025    Orders Placed This Encounter   Procedures    Place in Observation     Standing Status:   Standing     Number of Occurrences:   1     Order Specific Question:   Admitting Physician     Answer:   Yolie Chavez [[id number]]     Order Specific Question:   Level of Care     Answer:   Med Surg [[id number]]     ED Arrival Information     Expected Arrival Acuity Means of Arrival Escorted By Service Admission Type    - 7/17/2019 17:38 Urgent Ambulance [facility] General Medicine Urgent    Arrival Complaint    -        Chief Complaint   Patient presents with   Nicole Day Fall     pt arrives from [facility] and has had multiple falls in the last few days  was evaluated yesterday at [facility] after a fall   had another fall today  denies LOC or thinners  pts states just tripped but hx of dementia and stroke  pt offers no complaints at this time  there is old brusing to right side of face  Assessment/Plan:  73 yo female w/ pmh dementia, htn,  presents to ED from [facility] via EMS with several falls over the past 4 days  Pt had a witnessed fall on 7/13  - standing & fell backwards, hit head - Seen in ED @ [facility] on July 13th, CBC and CMP were unremarkable, head CT maxillofacial CT were unremarkable  Right hand x-ray and chest x-ray are unremarkable  Pt had another unwitnessed fall today  Pt not able to return to facility   Right shoulder contusion with surrounding ecchymoses will obtain right shoulder x-ray  Admitted to OBS with Ambulatory dysfunction  Fall precautions, PT / OT   Continue lisinopril 5 mg daily for HTN    ED Triage Vitals   Temperature Pulse Respirations Blood Pressure SpO2   07/17/19 1746 07/17/19 1746 07/17/19 1746 07/17/19 1746 07/17/19 1746   98 9 °F (37 2 °C) 95 16 156/86 98 %      Temp Source Heart Rate Source Patient Position - Orthostatic VS BP Location FiO2 (%)   07/17/19 1746 07/17/19 1907 07/17/19 1907 07/17/19 1907 --   Oral Monitor Lying Right arm       Pain Score       07/17/19 1746       No Pain        Wt Readings from Last 1 Encounters:   07/17/19 55 6 kg (122 lb 9 2 oz)     Additional Vital Signs:   07/18/19 0729  99 3 °F (37 4 °C)  81  18  143/67  98 %  None (Room air)  Lying   07/17/19 2315  97 2 °F (36 2 °C)   77  17  109/51  100 %  None (Room air)  Lying   07/17/19 1907    89  16  154/67  100 %  None (Room air)           Pertinent Labs/Diagnostic Test Results:   Results from last 7 days   Lab Units 07/18/19  0505 07/17/19  1840   WBC Thousand/uL 5 56 9 72   HEMOGLOBIN g/dL 10 4* 11 7   HEMATOCRIT % 33 2* 36 4   PLATELETS Thousands/uL 389 441*   NEUTROS ABS Thousands/µL 3 21 7 03     Results from last 7 days   Lab Units 07/18/19  0505 07/17/19  1840   SODIUM mmol/L 142 141   POTASSIUM mmol/L 3 8 3 5   CHLORIDE mmol/L 109* 105   CO2 mmol/L 27 29   ANION GAP mmol/L 6 7   BUN mg/dL 21 27*   CREATININE mg/dL 0 59* 0 68   EGFR ml/min/1 73sq m 90 86   CALCIUM mg/dL 8 9 9 5     Results from last 7 days   Lab Units 07/17/19  2330   POC GLUCOSE mg/dl 153*     Results from last 7 days   Lab Units 07/18/19  0505 07/17/19  1840   GLUCOSE RANDOM mg/dL 94 105     7/17 CT Head: Stable ventriculomegaly out of proportion to the degree of sulcal dilatation  Normal pressure hydrocephalus is again not excluded by imaging  There are again low lying cerebellar tonsils which enter the foramen magnum crowding the cervical cord and   partially effacing the CSF spaces  No intracranial hemorrhage or territorial infarct is found      7/18 Right Shoulder Xray:     ED Treatment:   Medication Administration from 07/17/2019 1738 to 07/17/2019 2047     None        Past Medical History:   Diagnosis Date    Dementia     Diabetes mellitus ([address] )     Expressive aphasia     Hyperlipidemia     Hypertension     Impaired fasting glucose     Osteoarthritis     Vitamin D deficiency      Present on Admission:   Frontotemporal dementia without behavioral disturbance   Benign essential hypertension   Ambulatory dysfunction      Admitting Diagnosis: Weakness [R53 1]  Age/Sex: 74 y o  female     Admission Orders:  Current Facility-Administered Medications:  acetaminophen 650 mg Oral Q6H PRN    artificial tear  Both Eyes HS    enoxaparin 40 mg Subcutaneous Daily    ENSURE ACTIVE HIGH PROTEIN 8 oz Oral BID    lisinopril 5 mg Oral Daily    rivastigmine 4 5 mg Oral Daily      SCD's        Network Utilization Review Department  Phone: [phone]; Fax [phone]  [email or web address]  ATTENTION: Please call with any questions or concerns to [phone]  and carefully listen to the prompts so that you are directed to the right person  Send all requests for admission clinical reviews, approved or denied determinations and any other requests to fax [phone]   All voicemails are confidential

## 2019-07-18 NOTE — PROGRESS NOTES
[facility] Progress Note  Patient: Archie Casper 74 y o  female   MRN: [id number]  PCP: Deanna Fox MD  Unit/Bed#: E4 MS 60886 Encounter: [id number]  Date Of Visit: 07/18/19    Assessment:    Principal Problem:    Ambulatory dysfunction  Active Problems:    Frontotemporal dementia without behavioral disturbance    Benign essential hypertension      Plan:    · Ambulatory dysfunction with recurrent falls of increasing frequency presents with several areas of bruising of her right shoulder right knee right periorbital ecchymosis no acute fracture/CT imaging showing no evidence of any hemorrhagic change and stable ventriculomegaly seen prior consistent with possible normal pressure hydrocephalus concurred that this is likely progression of this patient's already known history of dementia/ [facility] facility refusing to take back and will need alternate extended care facility for placement case management consult placed/will consult PT/OT bed alarm and fall precautions  · Right shoulder contusion with surrounding ecchymoses will obtain right shoulder x-ray  · Frontal temporal dementia without behavioral disturbance disoriented to all spheres continue Exelon/no signs of other etiology to or possible encephalopathy with normal chemistries and TSH/has had recent neurologic evaluations on consultation with no further interventions recommended and presents is likely progression of her dementia/will obtain UA and C+S  · Benign essential hypertension continue lisinopril 5 mg daily      VTE Pharmacologic Prophylaxis:   Pharmacologic: Enoxaparin (Lovenox)  Mechanical VTE Prophylaxis in Place: Yes    Discussions with Specialists or Other Care Team Provider:  None    Time Spent for Care: 45 minutes  More than 50% of total time spent on counseling and coordination of care as described above        Subjective:   Pleasant but confused poor historian seems to be normal or breakfast multiple areas of bruising noted over right periorbital right knee right shoulder    Objective:     Vitals:   Temp (24hrs), Av 3 °F (36 8 °C), Min:97 2 °F (36 2 °C), Max:99 3 °F (37 4 °C)    Temp:  [97 2 °F (36 2 °C)-99 3 °F (37 4 °C)] 99 3 °F (37 4 °C)  HR:  [77-95] 81  Resp:  [16-18] 18  BP: (109-156)/(51-86) 143/67  SpO2:  [98 %-100 %] 98 %  Body mass index is 24 76 kg/m²  Input and Output Summary (last 24 hours):     No intake or output data in the 24 hours ending 19 1046    Physical Exam:     Physical Exam:   General appearance: alert, slowed mentation, appears stated age and cooperative  Head: Normocephalic, without obvious abnormality, Old area of ecchymosis resolving right cheek area no palpable pain small laceration over right eyebrow with cicatrix  Lungs: clear to auscultation bilaterally  Heart: regular rate and rhythm  Abdomen: soft, non-tender; bowel sounds normal; no masses,  no organomegaly  Back: negative  Extremities: Right shoulder ecchymosis right knee ecchymosis range of motion intact in both  Neurologic: Mental status: orientation: time, date, person, place, president all abnormal      Additional Data:     Labs:    Results from last 7 days   Lab Units 19  0505   WBC Thousand/uL 5 56   HEMOGLOBIN g/dL 10 4*   HEMATOCRIT % 33 2*   PLATELETS Thousands/uL 389   NEUTROS PCT % 58   LYMPHS PCT % 28   MONOS PCT % 9   EOS PCT % 4     Results from last 7 days   Lab Units 19  0505   POTASSIUM mmol/L 3 8   CHLORIDE mmol/L 109*   CO2 mmol/L 27   BUN mg/dL 21   CREATININE mg/dL 0 59*   CALCIUM mg/dL 8 9           * I Have Reviewed All Lab Data Listed Above  * Additional Pertinent Lab Tests Reviewed: [facility] Admission Reviewed    Imaging:  Ct Head Without Contrast    Result Date: 2019  Narrative: CT BRAIN - WITHOUT CONTRAST INDICATION:   Superficial injury of head  COMPARISON:  Prior CT dated 2019  Prior brain MRI dated 2019   TECHNIQUE:  CT examination of the brain was performed  In addition to axial images, coronal 2D reformatted images were created and submitted for interpretation  Radiation dose length product (DLP) for this visit:  827 mGy-cm   This examination, like all CT scans performed in the [facility], was performed utilizing techniques to minimize radiation dose exposure, including the use of iterative reconstruction and automated exposure control  IMAGE QUALITY:  Diagnostic  FINDINGS: PARENCHYMA: Age-related central atrophy  Decreased attenuation is noted in periventricular and subcortical white matter demonstrating an appearance that is statistically most likely to represent mild microangiopathic change  No CT signs of acute infarction  No intracranial mass, mass effect or midline shift  No acute parenchymal hemorrhage  As seen previously: The cerebellar tonsils are low-lying, entering the foramen magnum and crowding the cervical cord and partially effacing the CSF spaces  VENTRICLES AND EXTRA-AXIAL SPACES:  Patient again exhibits ventriculomegaly which is stable from April  Left lateral ventricle slightly larger than the right however there is no midline shift  Cannot exclude normal pressure hydrocephalus  VISUALIZED ORBITS AND PARANASAL SINUSES:  Unremarkable  CALVARIUM AND EXTRACRANIAL SOFT TISSUES:  Normal      Impression: Stable ventriculomegaly out of proportion to the degree of sulcal dilatation  Normal pressure hydrocephalus is again not excluded by imaging  There are again low lying cerebellar tonsils which enter the foramen magnum crowding the cervical cord and partially effacing the CSF spaces  No intracranial hemorrhage or territorial infarct is found   Workstation performed: [id number]     Imaging Reports Reviewed Today Include:   Imaging Personally Reviewed by Myself Includes:    Procedure: Ct Head Without Contrast    Result Date: 7/17/2019  Narrative: CT BRAIN - WITHOUT CONTRAST INDICATION:   Superficial injury of head  COMPARISON:  Prior CT dated April 24, 2019  Prior brain MRI dated April 26, 2019  TECHNIQUE:  CT examination of the brain was performed  In addition to axial images, coronal 2D reformatted images were created and submitted for interpretation  Radiation dose length product (DLP) for this visit:  827 mGy-cm   This examination, like all CT scans performed in the [facility], was performed utilizing techniques to minimize radiation dose exposure, including the use of iterative reconstruction and automated exposure control  IMAGE QUALITY:  Diagnostic  FINDINGS: PARENCHYMA: Age-related central atrophy  Decreased attenuation is noted in periventricular and subcortical white matter demonstrating an appearance that is statistically most likely to represent mild microangiopathic change  No CT signs of acute infarction  No intracranial mass, mass effect or midline shift  No acute parenchymal hemorrhage  As seen previously: The cerebellar tonsils are low-lying, entering the foramen magnum and crowding the cervical cord and partially effacing the CSF spaces  VENTRICLES AND EXTRA-AXIAL SPACES:  Patient again exhibits ventriculomegaly which is stable from April  Left lateral ventricle slightly larger than the right however there is no midline shift  Cannot exclude normal pressure hydrocephalus  VISUALIZED ORBITS AND PARANASAL SINUSES:  Unremarkable  CALVARIUM AND EXTRACRANIAL SOFT TISSUES:  Normal      Impression: Stable ventriculomegaly out of proportion to the degree of sulcal dilatation  Normal pressure hydrocephalus is again not excluded by imaging  There are again low lying cerebellar tonsils which enter the foramen magnum crowding the cervical cord and partially effacing the CSF spaces  No intracranial hemorrhage or territorial infarct is found   Workstation performed: [id number]        Recent Cultures (last 7 days):           Last 24 Hours Medication List:     Current Facility-Administered Medications:  acetaminophen 650 mg Oral Q6H PRN Peggy Dapper, PA-C   artificial tear  Both Eyes HS Lyn Peter Galan, PA-C   enoxaparin 40 mg Subcutaneous Daily Lyn Green [place], [place]   ENSURE ACTIVE HIGH PROTEIN 8 oz Oral BID Peggy Dapper, PA-C   lisinopril 5 mg Oral Daily Peggy Dapper, PA-C   rivastigmine 4 5 mg Oral Daily Peggy Dapper, PA-C        Today, Patient Was Seen By: Ofelia Velazco MD    ** Please Note: Dragon 360 Dictation voice to text software may have been used in the creation of this document   **

## 2019-07-18 NOTE — PLAN OF CARE
Problem: PHYSICAL THERAPY ADULT  Goal: Performs mobility at highest level of function for planned discharge setting  See evaluation for individualized goals  Description  Treatment/Interventions: Functional transfer training, LE strengthening/ROM, Therapeutic exercise, Endurance training, Patient/family training, Equipment eval/education, Bed mobility, Gait training, Compensatory technique education, Continued evaluation, Spoke to nursing  Equipment Recommended: (monitor )       See flowsheet documentation for full assessment, interventions and recommendations  Note:   Prognosis: Fair  Problem List: Decreased strength, Decreased endurance, Impaired balance, Decreased mobility, Decreased cognition, Impaired judgement, Decreased safety awareness  Assessment: Pt is 75 y/o female with hx of frontotemporal dementia and aphasia, presents with multiple falls and ambulatory dysfunction  Resides in [facility] however facility unable to allow for pt to return  PT consulted to assist with d/c planning and recommendations  Prior to admission resides at [facility]  States use of cane  Pt unreliable historian  Significant increase in falls noted in history  Currently presents with functional limitations related to impairments in strength, balance, cognition, activity tolerance and overall mobility  Min A needed for transfers and ambulation with hand held assistance  Will avoid WB to RUE until xrays completed, however denies pain  Gait with inconsistencies in pattern and lateral sway  Will likely benefit from trial of RW to optimize stability  Will benefit from ongoing PT in order to address impairments as well as reduce fall risk as well as more supervised environment given hx of dementia and significant risk for falls  PT will follow and progress          Recommendation: Short-term skilled PT, 24 hour supervision/assist     PT - OK to Discharge: (yes to appropriate level of care when stable, no to SHAINA)    See flowsheet documentation for full assessment

## 2019-07-18 NOTE — PHYSICAL THERAPY NOTE
PT EVALUATION    74 y o     [id number]    Weakness [R53 1]    Past Medical History:   Diagnosis Date    Dementia     Diabetes mellitus ([address] )     Expressive aphasia     Hyperlipidemia     Hypertension     Impaired fasting glucose     Osteoarthritis     Vitamin D deficiency          Past Surgical History:   Procedure Laterality Date    JOINT REPLACEMENT      right knee        07/18/19 1825   Note Type   Note type Eval only   Pain Assessment   Pain Assessment No/denies pain   Home Living   [facility]   Home Layout One level   Bathroom Shower/Tub Walk-in shower   Bathroom Toilet Standard   Bathroom Equipment Grab bars in shower;Grab bars around toilet   [facility]   Additional Comments pt aphasic and with limited abiltiy to provide hx  Prior Function   Level of [place] Independent with ADLs and functional mobility; Needs assistance with IADLs   Lives With Facility staff   Receives Help From Personal care attendant   ADL Assistance Independent   IADLs Needs assistance   Falls in the last 6 months >10   Vocational Retired   Comments Reports use of cane PTA  Denies having RW   Restrictions/Precautions   Weight Bearing Precautions Per Order No  (will maintain NWB RUE until xrays completed )   Other Precautions Fall Risk;Pain;WBS;Chair Alarm; Bed Alarm;Cognitive   General   Additional Pertinent History Pt is 75 y/o female presents with amb dysfuntion and multiple falls  PT consulted  From [facility], but facility unable to take pt back  Family/Caregiver Present No   Cognition   Overall Cognitive Status Impaired   Orientation Level Oriented to person   Following Commands Follows one step commands with increased time or repetition   Comments aphasic  Pleasant     RUE Assessment   RUE Assessment X  (elbow distal WFL, shoulder not tested pending xrays )   LUE Assessment   LUE Assessment WFL   RLE Assessment   RLE Assessment WFL  (grossly 4-/ 5)   LLE Assessment   LLE Assessment WFL  (grossly 4-/5)   Bed Mobility   Additional Comments received OOB in chair  Transfers   Sit to Stand 4  Minimal assistance   Additional items Assist x 1; Increased time required;Verbal cues;Armrests   Stand to Sit 4  Minimal assistance   Additional items Assist x 1; Increased time required;Armrests   Stand pivot 4  Minimal assistance   Additional items Assist x 1; Increased time required;Verbal cues   Additional Comments cues for safety needed  Ambulation/Elevation   Gait pattern Decreased foot clearance; Improper Weight shift; Inconsistent ifrah; Short stride   Gait Assistance 4  Minimal assist   Additional items Assist x 1;Verbal cues; Tactile cues   Assistive Device   (hand held assistance )   Distance Amb with hand held assistance 20'x2  Balance   Static Sitting Fair +   Dynamic Sitting Fair -   Static Standing Fair   Dynamic Standing Poor +   Ambulatory Poor +   Activity Tolerance   Activity Tolerance Patient tolerated treatment well;Patient limited by fatigue   Medical Staff Made Aware NurseRoslyn   Nurse Made Aware yes   Assessment   Prognosis Fair   Problem List Decreased strength;Decreased endurance; Impaired balance;Decreased mobility; Decreased cognition; Impaired judgement;Decreased safety awareness   Assessment Pt is 73 y/o female with hx of frontotemporal dementia and aphasia, presents with multiple falls and ambulatory dysfunction  Resides in [facility] facility unable to allow for pt to return  PT consulted to assist with d/c planning and recommendations  Prior to admission resides at [facility]  States use of cane  Pt unreliable historian  Significant increase in falls noted in history  Currently presents with functional limitations related to impairments in strength, balance, cognition, activity tolerance and overall mobility  Min A needed for transfers and ambulation with hand held assistance    Will avoid WB to RUE until xrays completed, however denies pain  Gait with inconsistencies in pattern and lateral sway  Will likely benefit from trial of RW to optimize stability  Will benefit from ongoing PT in order to address impairments as well as reduce fall risk as well as more supervised environment given hx of dementia and significant risk for falls  PT will follow and progress  Goals   Patient Goals none stated 2* cognition  STG Expiration Date 07/28/19   Short Term Goal #1 10 days: 1)  Pt will perform bed mobility with S in order to improve function  2)  Perform all transfers with S in order to improve ability to negotiate safely in home environment  3) Amb with least restrictive AD > 200'x1 with S in order to demonstrate ability to negotiate in home environment  4)  Improve overall strength and balance 1/2 grade in order to optimize ability to perform functional tasks and reduce fall risk  5) Increase activity tolerance to 45 minutes in order to improve endurance to functional tasks  6) PT for ongoing patient and family/caregiver education, DME needs and d/c planning in order to promote highest level of function in least restrictive environment  Plan   Treatment/Interventions Functional transfer training;LE strengthening/ROM; Therapeutic exercise; Endurance training;Patient/family training;Equipment eval/education; Bed mobility;Gait training; Compensatory technique education;Continued evaluation;Spoke to nursing   PT Frequency Other (Comment);2-3x/wk   Recommendation   Recommendation Short-term skilled PT;24 hour supervision/assist   Equipment Recommended   (monitor  )   PT - OK to Discharge   (yes to appropriate level of care when stable, no to correction)   Modified [place] Scale   Modified [place] Scale 4   Barthel Index   Feeding 5   Bathing 0   Grooming Score 0   Dressing Score 5   Bladder Score 0   Bowels Score 5   Toilet Use Score 5   Transfers (Bed/Chair) Score 10   Mobility (Level Surface) Score 0   Stairs Score 0   Barthel Index Score 30     History: co - morbidities, fall risk,assist for adl's, cognition,  Exam: impairments in locomotion, musculoskeletal, balance,posture, cognition   Clinical: unstable/unpredictable ( fall risk, cognition, chair/bed alarm, pending shoulder xray)  Complexity:high    Javed Slaughter, PT

## 2019-07-18 NOTE — ASSESSMENT & PLAN NOTE
· Patient oriented to self only at baseline  · Patient will require placement - current nursing facility unable to provide necessary supervision for patient's safety  · Continue supportive care, reorient as needed    Maintain sleep-wake cycle

## 2019-07-18 NOTE — H&P
H&P- [facility] 1945, 74 y o  female MRN: [id number]    Unit/Bed#: E4 -01 Encounter: [id number]    Primary Care Provider: Vanessa Branham MD   Date and time admitted to hospital: 7/17/2019  5:38 PM    * Ambulatory dysfunction  Assessment & Plan  · Patient presents from [facility] - noted to have ambulatory dysfunction with recurrent falls over the past 4-5 days  Patient was cleared in the emergency department however nursing facility is refusing to accept patient back at this time  · CT head: stable ventriculomegaly  Possible normal pressure hydrocephalus  No intracranial hemorrhage or infarction  · CBC, BMP WNL  · Has been evaluated by Neurology previously, no further intervention required  Likely progression of patient's dementia  · Attempt to obtain UA - history of UTIs previously  · Fall precautions, bed alarm, PT/OT consult      Benign essential hypertension  Assessment & Plan  · Blood pressure currently stable  · Continue lisinopril 5 mg daily    Frontotemporal dementia without behavioral disturbance  Assessment & Plan  · Patient oriented to self only at baseline  · Patient will require placement - current nursing facility unable to provide necessary supervision for patient's safety  · Continue supportive care, reorient as needed  Maintain sleep-wake cycle    VTE Prophylaxis: Enoxaparin (Lovenox)  / sequential compression device   Code Status:  Level 1 - full code  POLST: There is no POLST form on file for this patient (pre-hospital)  Discussion with family:  Discussed with patient's daughter and Arianne Milner  Informed her that patient will be admitted to the hospital, she was unaware patient was in the emergency department  She verbalizes understanding that the nursing facility is refusing to accept patient back  She lives in [place] and her other sister lives in [address] [place] [address] and have been managing the patient's healthcare remotely      Anticipated Length of Stay: Patient will be admitted on an Observation basis with an anticipated length of stay of  less than 2 midnights  Justification for Hospital Stay:  Ambulatory dysfunction    Total Time for Visit, including Counseling / Coordination of Care: 45 minutes  Greater than 50% of this total time spent on direct patient counseling and coordination of care  Chief Complaint:   "I don't know"    History of Present Illness:    Litzy Berry is a 74 y o  female who presents with ambulatory dysfunction  Past medical history significant for dementia, hypertension, frequent falls  Patient is a poor historian and does not contribute to HPI  Majority of information gathered from discussion with the ED attending who spoke with the patient's nursing facility  Daughter corroborates that patient has had at least 10 falls within the last 2 days, and she personally had been talking with her physical therapist regarding arranging a walker for the patient and assessing her room for fall risks  However daughter also believes it seems that patient's dementia is progressing which is likely contributing to her frequent falls and impulsiveness  The nursing facility unfortunately was unable to offer much in the way of information about the patient other than the fact she is incontinent  They verbalized to the emergency department that they would not be accepting her back to their facility after she was medically cleared to return there      Review of Systems:    Review of Systems   Unable to perform ROS: Dementia       Past Medical and Surgical History:     Past Medical History:   Diagnosis Date    Dementia     Diabetes mellitus ([address] )     Expressive aphasia     Hyperlipidemia     Hypertension     Impaired fasting glucose     Osteoarthritis     Vitamin D deficiency        Past Surgical History:   Procedure Laterality Date    JOINT REPLACEMENT      right knee       Meds/Allergies:    Prior to Admission medications    Medication Sig Start Date End Date Taking? Authorizing Provider   acetaminophen (TYLENOL) 325 mg tablet Take 2 tablets (650 mg total) by mouth every 6 (six) hours as needed for mild pain, moderate pain, headaches or fever 5/3/19  Yes Gema Kumar MD   artificial tear (LUBRIFRESH P M ) 83-15 % ophthalmic ointment Administer to both eyes daily at bedtime 5/3/19  Yes Gema Kumar MD   calcium carbonate-vitamin D (OSCAL-D) 500 mg-200 units per tablet Take 1 tablet by mouth daily with breakfast 5/4/19  Yes Gema Kumar MD   cyanocobalamin 1000 MCG tablet Take 1 tablet (1,000 mcg total) by mouth daily 5/3/19  Yes Gema Kumar MD   docusate sodium (COLACE) 100 mg capsule Take 1 capsule (100 mg total) by mouth daily 5/3/19  Yes Gema Kumar MD   lisinopril (ZESTRIL) 5 mg tablet Take 1 tablet (5 mg total) by mouth daily 5/3/19 5/2/20 Yes Gema Kumra MD   Nutritional Supplements (ENSURE ACTIVE HIGH PROTEIN) LIQD Take 8 oz by mouth 2 (two) times a day 5/3/19  Yes Gema Kumar MD   rivastigmine (EXELON) 4 5 MG capsule Take 1 capsule (4 5 mg total) by mouth daily 5/3/19  Yes Gema Kumar MD     I have reveiwed home medications using records provided by [facility]  Allergies: No Known Allergies    Social History:     Marital Status:    Occupation:  Retired  Patient Pre-hospital Living Situation:  Nursing facility  Patient Pre-hospital Level of Mobility:  Had been ambulating with a cane and was being fitted for a walker per daughter  Patient Pre-hospital Diet Restrictions:  None  Substance Use History:   Social History     Substance and Sexual Activity   Alcohol Use Never    Frequency: Never     Social History     Tobacco Use   Smoking Status Never Smoker   Smokeless Tobacco Never Used     Social History     Substance and Sexual Activity   Drug Use Never       Family History:    History reviewed  No pertinent family history      Physical Exam:     Vitals:   Blood Pressure: 138/74 (07/17/19 2050)  Pulse: 83 (07/17/19 2050)  Temperature: 97 8 °F (36 6 °C) (07/17/19 2050)  Temp Source: Tympanic (07/17/19 2050)  Respirations: 18 (07/17/19 2050)  Height: 4' 11" (149 9 cm) (07/17/19 2050)  Weight - Scale: 55 6 kg (122 lb 9 2 oz) (07/17/19 2050)  SpO2: 98 % (07/17/19 2050)    Physical Exam   Constitutional: Vital signs are normal  She appears well-developed  Patient is sleeping upon entering room, initially refuses to open eyes  HENT:   Head: Normocephalic  Eyes: Pupils are equal, round, and reactive to light  Conjunctivae and EOM are normal  No scleral icterus  Healing laceration above right orbit  Resolving ecchymosis  Non-tender to palpation   Neck: Normal range of motion  No spinous process tenderness and no muscular tenderness present  No edema and normal range of motion present  Cardiovascular: Normal rate, regular rhythm and normal heart sounds  No murmur heard  Pulmonary/Chest: Effort normal and breath sounds normal  No respiratory distress  She has no wheezes  She has no rhonchi  She has no rales  Abdominal: Soft  Bowel sounds are normal  There is no tenderness  There is no rigidity, no rebound and no guarding  Musculoskeletal: She exhibits no edema, tenderness or deformity  Patient refuses to follow commands to assess ambulation or movement of extremities  Neurological: She is alert  Initially refuses answer questions or open eyes  She becomes alert, answers yes or no  Is able to state her name 1st and last but is otherwise disoriented to time and place which per her daughter is her baseline  Overall neurologic exam appears nonfocal but difficult to assess given patient's dementia   Skin: Skin is warm and dry  Various areas of healing in regards to skin tears, abrasions likely sustained due to falls but no obvious deformities   Psychiatric: Her affect is blunt  Her speech is delayed  She is withdrawn  Cognition and memory are impaired  She expresses impulsivity     Nursing note and vitals reviewed  Additional Data:     Lab Results: I have personally reviewed pertinent reports  Results from last 7 days   Lab Units 07/17/19  1840   WBC Thousand/uL 9 72   HEMOGLOBIN g/dL 11 7   HEMATOCRIT % 36 4   PLATELETS Thousands/uL 441*   NEUTROS PCT % 71   LYMPHS PCT % 18   MONOS PCT % 8   EOS PCT % 1     Results from last 7 days   Lab Units 07/17/19  1840   SODIUM mmol/L 141   POTASSIUM mmol/L 3 5   CHLORIDE mmol/L 105   CO2 mmol/L 29   BUN mg/dL 27*   CREATININE mg/dL 0 68   ANION GAP mmol/L 7   CALCIUM mg/dL 9 5   GLUCOSE RANDOM mg/dL 105         Results from last 7 days   Lab Units 07/17/19  2330   POC GLUCOSE mg/dl 153*               Imaging: I have personally reviewed pertinent reports  CT head without contrast   Final Result by Allyson Barrios DO (07/17 1918)   Stable ventriculomegaly out of proportion to the degree of sulcal dilatation  Normal pressure hydrocephalus is again not excluded by imaging  There are again low lying cerebellar tonsils which enter the foramen magnum crowding the cervical cord and    partially effacing the CSF spaces  No intracranial hemorrhage or territorial infarct is found  Workstation performed: [id number]             EKG, Pathology, and Other Studies Reviewed on Admission:   · CT head reviewed    [facility]     ** Please Note: This note has been constructed using a voice recognition system   **

## 2019-07-18 NOTE — UTILIZATION REVIEW
Notification of Observation Care Status/Observation Authorization Request    This is a Notification of Observation Care Status to our facility [address]  Be advised that this patient was admitted to our facility under Observation Status  Please contact the Utilization Review Department where the patient is receiving care services for additional admission information  Place of Service Code: 25  Place of Service Name: [facility]  CPT Code for Observation: CPT [place] / CPT 98704    Presentation Date & Time: 7/17/2019  5:38 PM  Observation Admission Date & Time: 07/17 @ 2025  Discharge Date & Time: No discharge date for patient encounter  Discharge Disposition (if discharged): Home/Self Care  Attending Physician: Ingrid Alatorre [[id number]]  Admission Orders (From admission, onward)    Ordered        07/17/19 2025  Place in Observation  Once             Facility: [address] Utilization Review Department  Phone: [phone]; Fax [phone]  [email or web address]  ATTENTION: Please call with any questions or concerns to [phone]  and carefully listen to the prompts so that you are directed to the right person  Send all requests for admission clinical reviews, approved or denied determinations and any other requests to fax [phone]   All voicemails are confidential

## 2019-07-18 NOTE — ASSESSMENT & PLAN NOTE
· Patient presents from [facility] - noted to have ambulatory dysfunction with recurrent falls over the past 4-5 days  Patient was cleared in the emergency department however nursing facility is refusing to accept patient back at this time  · CT head: stable ventriculomegaly  Possible normal pressure hydrocephalus  No intracranial hemorrhage or infarction  · CBC, BMP WNL  · Has been evaluated by Neurology previously, no further intervention required    Likely progression of patient's dementia  · Attempt to obtain UA - history of UTIs previously  · Fall precautions, bed alarm, PT/OT consult

## 2019-07-18 NOTE — PLAN OF CARE
Problem: Potential for Falls  Goal: Patient will remain free of falls  Description  INTERVENTIONS:  - Assess patient frequently for physical needs  -  Identify cognitive and physical deficits and behaviors that affect risk of falls    -  [place] fall precautions as indicated by assessment   - Educate patient/family on patient safety including physical limitations  - Instruct patient to call for assistance with activity based on assessment  - Modify environment to reduce risk of injury  - Consider OT/PT consult to assist with strengthening/mobility  Outcome: Progressing     Problem: Prexisting or High Potential for Compromised Skin Integrity  Goal: Skin integrity is maintained or improved  Description  INTERVENTIONS:  - Identify patients at risk for skin breakdown  - Assess and monitor skin integrity  - Assess and monitor nutrition and hydration status  - Monitor labs (i e  albumin)  - Assess for incontinence   - Turn and reposition patient  - Assist with mobility/ambulation  - Relieve pressure over bony prominences  - Avoid friction and shearing  - Provide appropriate hygiene as needed including keeping skin clean and dry  - Evaluate need for skin moisturizer/barrier cream  - Collaborate with interdisciplinary team (i e  Nutrition, Rehabilitation, etc )   - Patient/family teaching  Outcome: Progressing     Problem: PAIN - ADULT  Goal: Verbalizes/displays adequate comfort level or baseline comfort level  Description  Interventions:  - Encourage patient to monitor pain and request assistance  - Assess pain using appropriate pain scale  - Administer analgesics based on type and severity of pain and evaluate response  - Implement non-pharmacological measures as appropriate and evaluate response  - Consider cultural and social influences on pain and pain management  - Notify physician/advanced practitioner if interventions unsuccessful or patient reports new pain  Outcome: Progressing     Problem: INFECTION - ADULT  Goal: Absence or prevention of progression during hospitalization  Description  INTERVENTIONS:  - Assess and monitor for signs and symptoms of infection  - Monitor lab/diagnostic results  - Monitor all insertion sites, i e  indwelling lines, tubes, and drains  - Monitor endotracheal (as able) and nasal secretions for changes in amount and color  - [place] appropriate cooling/warming therapies per order  - Administer medications as ordered  - Instruct and encourage patient and family to use good hand hygiene technique  - Identify and instruct in appropriate isolation precautions for identified infection/condition  Outcome: Progressing  Goal: Absence of fever/infection during neutropenic period  Description  INTERVENTIONS:  - Monitor WBC  - Implement neutropenic guidelines  Outcome: Progressing     Problem: SAFETY ADULT  Goal: Patient will remain free of falls  Description  INTERVENTIONS:  - Assess patient frequently for physical needs  -  Identify cognitive and physical deficits and behaviors that affect risk of falls    -  [place] fall precautions as indicated by assessment   - Educate patient/family on patient safety including physical limitations  - Instruct patient to call for assistance with activity based on assessment  - Modify environment to reduce risk of injury  - Consider OT/PT consult to assist with strengthening/mobility  Outcome: Progressing  Goal: Maintain or return to baseline ADL function  Description  INTERVENTIONS:  -  Assess patient's ability to carry out ADLs; assess patient's baseline for ADL function and identify physical deficits which impact ability to perform ADLs (bathing, care of mouth/teeth, toileting, grooming, dressing, etc )  - Assess/evaluate cause of self-care deficits   - Assess range of motion  - Assess patient's mobility; develop plan if impaired  - Assess patient's need for assistive devices and provide as appropriate  - Encourage maximum independence but intervene and supervise when necessary  ¯ Involve family in performance of ADLs  ¯ Assess for home care needs following discharge   ¯ Request OT consult to assist with ADL evaluation and planning for discharge  ¯ Provide patient education as appropriate  Outcome: Progressing  Goal: Maintain or return mobility status to optimal level  Description  INTERVENTIONS:  - Assess patient's baseline mobility status (ambulation, transfers, stairs, etc )    - Identify cognitive and physical deficits and behaviors that affect mobility  - Identify mobility aids required to assist with transfers and/or ambulation (gait belt, sit-to-stand, lift, walker, cane, etc )  - [place] fall precautions as indicated by assessment  - Record patient progress and toleration of activity level on Mobility SBAR; progress patient to next Phase/Stage  - Instruct patient to call for assistance with activity based on assessment  - Request Rehabilitation consult to assist with strengthening/weightbearing, etc   Outcome: Progressing     Problem: DISCHARGE PLANNING  Goal: Discharge to home or other facility with appropriate resources  Description  INTERVENTIONS:  - Identify barriers to discharge w/patient and caregiver  - Arrange for needed discharge resources and transportation as appropriate  - Identify discharge learning needs (meds, wound care, etc )  - Arrange for interpretive services to assist at discharge as needed  - Refer to Case Management Department for coordinating discharge planning if the patient needs post-hospital services based on physician/advanced practitioner order or complex needs related to functional status, cognitive ability, or social support system  Outcome: Progressing     Problem: Knowledge Deficit  Goal: Patient/family/caregiver demonstrates understanding of disease process, treatment plan, medications, and discharge instructions  Description  Complete learning assessment and assess knowledge base    Interventions:  - Provide teaching at level of understanding  - Provide teaching via preferred learning methods  Outcome: Progressing

## 2019-07-18 NOTE — PLAN OF CARE
Problem: Potential for Falls  Goal: Patient will remain free of falls  Description  INTERVENTIONS:  - Assess patient frequently for physical needs  -  Identify cognitive and physical deficits and behaviors that affect risk of falls    -  [place] fall precautions as indicated by assessment   - Educate patient/family on patient safety including physical limitations  - Instruct patient to call for assistance with activity based on assessment  - Modify environment to reduce risk of injury  - Consider OT/PT consult to assist with strengthening/mobility  Outcome: Progressing     Problem: Prexisting or High Potential for Compromised Skin Integrity  Goal: Skin integrity is maintained or improved  Description  INTERVENTIONS:  - Identify patients at risk for skin breakdown  - Assess and monitor skin integrity  - Assess and monitor nutrition and hydration status  - Monitor labs (i e  albumin)  - Assess for incontinence   - Turn and reposition patient  - Assist with mobility/ambulation  - Relieve pressure over bony prominences  - Avoid friction and shearing  - Provide appropriate hygiene as needed including keeping skin clean and dry  - Evaluate need for skin moisturizer/barrier cream  - Collaborate with interdisciplinary team (i e  Nutrition, Rehabilitation, etc )   - Patient/family teaching  Outcome: Progressing

## 2019-07-19 PROCEDURE — G8987 SELF CARE CURRENT STATUS: HCPCS

## 2019-07-19 PROCEDURE — 99225 PR SBSQ OBSERVATION CARE/DAY 25 MINUTES: CPT | Performed by: INTERNAL MEDICINE

## 2019-07-19 PROCEDURE — G8988 SELF CARE GOAL STATUS: HCPCS

## 2019-07-19 PROCEDURE — 97166 OT EVAL MOD COMPLEX 45 MIN: CPT

## 2019-07-19 RX ADMIN — Medication 8 OZ: at 18:00

## 2019-07-19 RX ADMIN — WHITE PETROLATUM 57.7 %-MINERAL OIL 31.9 % EYE OINTMENT: at 22:03

## 2019-07-19 RX ADMIN — ENOXAPARIN SODIUM 40 MG: 40 INJECTION SUBCUTANEOUS at 08:53

## 2019-07-19 RX ADMIN — LISINOPRIL 5 MG: 5 TABLET ORAL at 08:53

## 2019-07-19 RX ADMIN — RIVASTIGMINE TARTRATE 4.5 MG: 3 CAPSULE ORAL at 08:53

## 2019-07-19 RX ADMIN — Medication 8 OZ: at 08:30

## 2019-07-19 NOTE — SOCIAL WORK
Pt is a resident at [address] [place] [address]  SW called, s/w Zen Schafer ([phone]), discussed PT's recommendation  Zen Schafer requested the pt receive STR prior to returning to the [facility]  SNF choices were provided to the pt  Pt is agreeable to [facility] at the [address] [place] [address] Dr [place] was there in 05/19  SW left a VM message for pt's daughter Chikis Cabrera requesting a call back  SW also called pt's son, Diann Meyers, he was agreeable to the [address] [place] [address] - [facility] referral   Mark Meyer will continue to follow

## 2019-07-19 NOTE — PHYSICIAN ADVISOR
Current patient class: Observation  The patient is currently on Hospital Day: 3 at [facility]        The patient was admitted to the hospital  on N/A at N/A for the following diagnosis:  Weakness [R53 1]     After review of the relevant documentation, labs, vital signs and test results, the patient is most appropriate for OBSERVATION STATUS  Rationale is as follows: The patient is a 25-year-old female who presented on July 17, 2019 with ambulatory dysfunction and increasing falls  The patient has dementia  The patient's facility is declining to take the patient back in the patient requires alternate arrangements  The patient had multiple ecchymoses from falling  The patient appears to be at baseline  She is receiving very few medications, all by mouth  She is not requiring intravenous fluids or intravenous medication  Vitals are stable  The focus seems to be safe discharge planning likely to a different facility  Observation class remains appropriate        The patients vitals on arrival were ED Triage Vitals   Temperature Pulse Respirations Blood Pressure SpO2   07/17/19 1746 07/17/19 1746 07/17/19 1746 07/17/19 1746 07/17/19 1746   98 9 °F (37 2 °C) 95 16 156/86 98 %      Temp Source Heart Rate Source Patient Position - Orthostatic VS BP Location FiO2 (%)   07/17/19 1746 07/17/19 1907 07/17/19 1907 07/17/19 1907 --   Oral Monitor Lying Right arm       Pain Score       07/17/19 1746       No Pain           Past Medical History:   Diagnosis Date    Dementia     Diabetes mellitus ([address] )     Expressive aphasia     Hyperlipidemia     Hypertension     Impaired fasting glucose     Osteoarthritis     Vitamin D deficiency      Past Surgical History:   Procedure Laterality Date    JOINT REPLACEMENT      right knee           Consults have been placed to:   IP CONSULT TO CASE MANAGEMENT    Vitals:    07/18/19 0729 07/18/19 1543 07/18/19 2255 07/19/19 0700   BP: 143/67 163/72 129/65 118/66   BP Location: Right arm Left arm Left arm Left arm   Pulse: 81 73 80 79   Resp: 18 18 18 18   Temp: 99 3 °F (37 4 °C) 98 3 °F (36 8 °C) 97 7 °F (36 5 °C) 97 9 °F (36 6 °C)   TempSrc: Tympanic Temporal Temporal Tympanic   SpO2: 98% 100% 97% 97%   Weight:       Height:           Most recent labs:    Recent Labs     07/18/19  0505   WBC 5 56   HGB 10 4*   HCT 33 2*      K 3 8   CALCIUM 8 9   BUN 21   CREATININE 0 59*       Scheduled Meds:  Current Facility-Administered Medications:  acetaminophen 650 mg Oral Q6H PRN Eduar [place], PA-C   artificial tear  Both Eyes HS Tito Galan PA-C   enoxaparin 40 mg Subcutaneous Daily Tito Banks [place]   ENSURE ACTIVE HIGH PROTEIN 8 oz Oral BID Tito Galan PA-C   lisinopril 5 mg Oral Daily Tito Banks PA-C   rivastigmine 4 5 mg Oral Daily Tito Galan PA-C     Continuous Infusions:   PRN Meds:   acetaminophen

## 2019-07-19 NOTE — PLAN OF CARE
Problem: Potential for Falls  Goal: Patient will remain free of falls  Description  INTERVENTIONS:  - Assess patient frequently for physical needs  -  Identify cognitive and physical deficits and behaviors that affect risk of falls    -  [place] fall precautions as indicated by assessment   - Educate patient/family on patient safety including physical limitations  - Instruct patient to call for assistance with activity based on assessment  - Modify environment to reduce risk of injury  - Consider OT/PT consult to assist with strengthening/mobility  Outcome: Progressing     Problem: Prexisting or High Potential for Compromised Skin Integrity  Goal: Skin integrity is maintained or improved  Description  INTERVENTIONS:  - Identify patients at risk for skin breakdown  - Assess and monitor skin integrity  - Assess and monitor nutrition and hydration status  - Monitor labs (i e  albumin)  - Assess for incontinence   - Turn and reposition patient  - Assist with mobility/ambulation  - Relieve pressure over bony prominences  - Avoid friction and shearing  - Provide appropriate hygiene as needed including keeping skin clean and dry  - Evaluate need for skin moisturizer/barrier cream  - Collaborate with interdisciplinary team (i e  Nutrition, Rehabilitation, etc )   - Patient/family teaching  Outcome: Progressing     Problem: PAIN - ADULT  Goal: Verbalizes/displays adequate comfort level or baseline comfort level  Description  Interventions:  - Encourage patient to monitor pain and request assistance  - Assess pain using appropriate pain scale  - Administer analgesics based on type and severity of pain and evaluate response  - Implement non-pharmacological measures as appropriate and evaluate response  - Consider cultural and social influences on pain and pain management  - Notify physician/advanced practitioner if interventions unsuccessful or patient reports new pain  Outcome: Progressing     Problem: SAFETY ADULT  Goal: Patient will remain free of falls  Description  INTERVENTIONS:  - Assess patient frequently for physical needs  -  Identify cognitive and physical deficits and behaviors that affect risk of falls    -  [place] fall precautions as indicated by assessment   - Educate patient/family on patient safety including physical limitations  - Instruct patient to call for assistance with activity based on assessment  - Modify environment to reduce risk of injury  - Consider OT/PT consult to assist with strengthening/mobility  Outcome: Progressing  Goal: Maintain or return to baseline ADL function  Description  INTERVENTIONS:  -  Assess patient's ability to carry out ADLs; assess patient's baseline for ADL function and identify physical deficits which impact ability to perform ADLs (bathing, care of mouth/teeth, toileting, grooming, dressing, etc )  - Assess/evaluate cause of self-care deficits   - Assess range of motion  - Assess patient's mobility; develop plan if impaired  - Assess patient's need for assistive devices and provide as appropriate  - Encourage maximum independence but intervene and supervise when necessary  ¯ Involve family in performance of ADLs  ¯ Assess for home care needs following discharge   ¯ Request OT consult to assist with ADL evaluation and planning for discharge  ¯ Provide patient education as appropriate  Outcome: Progressing  Goal: Maintain or return mobility status to optimal level  Description  INTERVENTIONS:  - Assess patient's baseline mobility status (ambulation, transfers, stairs, etc )    - Identify cognitive and physical deficits and behaviors that affect mobility  - Identify mobility aids required to assist with transfers and/or ambulation (gait belt, sit-to-stand, lift, walker, cane, etc )  - [place] fall precautions as indicated by assessment  - Record patient progress and toleration of activity level on Mobility SBAR; progress patient to next Phase/Stage  - Instruct patient to call for assistance with activity based on assessment  - Request Rehabilitation consult to assist with strengthening/weightbearing, etc   Outcome: Progressing     Problem: DISCHARGE PLANNING  Goal: Discharge to home or other facility with appropriate resources  Description  INTERVENTIONS:  - Identify barriers to discharge w/patient and caregiver  - Arrange for needed discharge resources and transportation as appropriate  - Identify discharge learning needs (meds, wound care, etc )  - Arrange for interpretive services to assist at discharge as needed  - Refer to Case Management Department for coordinating discharge planning if the patient needs post-hospital services based on physician/advanced practitioner order or complex needs related to functional status, cognitive ability, or social support system  Outcome: Progressing     Problem: Knowledge Deficit  Goal: Patient/family/caregiver demonstrates understanding of disease process, treatment plan, medications, and discharge instructions  Description  Complete learning assessment and assess knowledge base  Interventions:  - Provide teaching at level of understanding  - Provide teaching via preferred learning methods  Outcome: Progressing     Problem: Nutrition/Hydration-ADULT  Goal: Nutrient/Hydration intake appropriate for improving, restoring or maintaining nutritional needs  Description  Monitor and assess patient's nutrition/hydration status for malnutrition (ex- brittle hair, bruises, dry skin, pale skin and conjunctiva, muscle wasting, smooth red tongue, and disorientation)  Collaborate with interdisciplinary team and initiate plan and interventions as ordered  Monitor patient's weight and dietary intake as ordered or per policy  Utilize nutrition screening tool and intervene per policy  Determine patient's food preferences and provide high-protein, high-caloric foods as appropriate       INTERVENTIONS:  - Monitor oral intake, urinary output, labs, and treatment plans  - Assess nutrition and hydration status and recommend course of action  - Evaluate amount of meals eaten  - Assist patient with eating if necessary   - Allow adequate time for meals  - Recommend/ encourage appropriate diets, oral nutritional supplements, and vitamin/mineral supplements  - Order, calculate, and assess calorie counts as needed  - Recommend, monitor, and adjust tube feedings and TPN/PPN based on assessed needs  - Assess need for intravenous fluids  - Provide specific nutrition/hydration education as appropriate  - Include patient/family/caregiver in decisions related to nutrition  Outcome: Progressing

## 2019-07-19 NOTE — OCCUPATIONAL THERAPY NOTE
[address]  Evaluation     Patient Name: Luz Maria Roth  EFFLB'O Date: 7/19/2019  Problem List  Patient Active Problem List   Diagnosis    Frontotemporal dementia without behavioral disturbance    Primary progressive aphasia    Recurrent falls    Benign essential hypertension    Pure hypercholesterolemia    Acute cystitis without hematuria    Forehead laceration    [facility] [address] [facility]    Left knee pain    Ambulatory dysfunction     Past Medical History  Past Medical History:   Diagnosis Date    Dementia     Diabetes mellitus ([facility] [address] [facility]     Expressive aphasia     Hyperlipidemia     Hypertension     Impaired fasting glucose     Osteoarthritis     Vitamin D deficiency      Past Surgical History  Past Surgical History:   Procedure Laterality Date    JOINT REPLACEMENT      right knee             07/19/19 1248   Note Type   Note type Eval/Treat   Restrictions/Precautions   Weight Bearing Precautions Per Order No  (maintained NWB R UE as pt w/ x-rays not red)   Other Precautions Chair Alarm;Cognitive; Bed Alarm; Fall Risk;Pain   Pain Assessment   Pain Assessment No/denies pain   Pain Score No Pain   Home Living   Type of Home Assisted living  ([facility])   Home Layout One level   Bathroom Shower/Tub Walk-in shower   Bathroom Toilet Standard   Bathroom Equipment Grab bars in shower;Grab bars around toilet; Shower chair   Bathroom Accessibility Accessible   Home Equipment Cane   Additional Comments historian taken from chart as pt w/ aphasia and able to provide limited history   Prior Function   Level of [place] Independent with ADLs and functional mobility; Needs assistance with IADLs  (supervision for showers)   Lives With Facility staff   Receives Help From Personal care attendant   ADL Assistance Independent  (supervision for showers)   IADLs Needs assistance   Falls in the last 6 months >10   Vocational Retired   Comments pt reports use of SPC in facility   Lifestyle Autonomy per pt independent w/ dressing, supervision for showering, independent w/ toileting, independent w/ functional transfers and mobility w/ SPC   Reciprocal Relationships facility staff   Service to Others retired raised the kids   [address] and going to activities   ADL   Where [address] 5  Supervision/Setup   Grooming Assistance 4  Minimal Assistance   Grooming Deficit Setup;Steadying;Verbal cueing;Supervision/safety; Increased time to complete;Wash/dry hands   UB Bathing Assistance 4  Minimal Assistance   LB Bathing Assistance 4  Minimal Assistance   [address] 3  Moderate [address]  3  Moderate Assistance   Toileting Deficit Setup;Verbal cueing; Increased time to complete;Supervison/safety; Clothing management up;Clothing management down;Grab bar use   Functional Assistance 3  Moderate Assistance   Bed Mobility   Supine to Sit 5  Supervision   Additional items Assist x 1; Increased time required;Verbal cues;LE management; Bedrails;HOB elevated   Sit to Supine 4  Minimal assistance   Additional items Assist x 1; Increased time required;Verbal cues;LE management   Additional Comments cues for safety and positioning   Transfers   Sit to Stand 4  Minimal assistance   Additional items Assist x 1; Increased time required;Verbal cues;Armrests   Stand to Sit 4  Minimal assistance   Additional items Assist x 1; Increased time required;Verbal cues   Toilet transfer 4  Minimal assistance   Additional items Assist x 1; Increased time required;Standard toilet;Verbal cues  (grab bar use)   Additional Comments VCs for hand placement and safety   Functional Mobility   Functional Mobility 4  Minimal assistance   Additional Comments assist x1 w/ increased time to complete w/ hand held assist   Additional items Hand hold assistance   Balance   Static Sitting Fair +   Dynamic Sitting Fair   Static Standing Fair -   Dynamic Standing Poor +   Ambulatory Poor +   Activity Tolerance   Activity Tolerance Patient limited by fatigue;Patient tolerated treatment well   Nurse Made Aware appropriate to see per Nelida AGUIRRE   RUE Assessment   RUE Assessment   (elbow WFL; shoulder NT 2* x-ray pending reading)   LUE Assessment   LUE Assessment WFL  (4-/5)   Hand Function   Gross Motor Coordination Functional   Fine Motor Coordination Functional   Sensation   Light Touch No apparent deficits   Proprioception   Proprioception No apparent deficits   Vision-Basic Assessment   Current Vision Wears glasses all the time   Vision - Complex Assessment   Ocular Range of Motion [facility] Able to read clock/calendar on wall without difficulty   Perception   Inattention/Neglect Appears intact   Cognition   Overall Cognitive Status Impaired   Arousal/Participation Responsive; Cooperative   Attention Attends with cues to redirect   Orientation Level Oriented to person;Disoriented to time;Disoriented to situation;Disoriented to place   Memory Decreased recall of recent events;Decreased short term memory;Decreased recall of precautions   Following Commands Follows one step commands with increased time or repetition   Comments pt w/ aphasia, impaired insight and safety awareness; pt w/ frontotemporal dementia   Assessment   Limitation Decreased ADL status; Decreased UE strength;Decreased Safe judgement during ADL;Decreased cognition;Decreased endurance;Decreased high-level ADLs; Decreased self-care trans   Prognosis Good   Assessment Pt is a 74 y o  female seen for OT evaluation s/p admit to [facility] on 7/17/2019 w/ Ambulatory dysfunction, increased falls  Pt w/ x-ray pending reading R UE  CT head (No intracranial hemorrhage or territorial infarct is found)  Comorbidities affecting pt's functional performance at time of assessment include: frontal temporal dementia, HTN, OA   Personal factors affecting pt at time of IE include:impaired insight and safety awareness, aphasia  Prior to admission, pt was living w/ at [address] [place] [address] and facility is declining to take pt back and pt reports independent w/ dressing, supervision showers, independent w/ functional transfers and mobility w/ SPC, assist IADLs  Upon evaluation: Pt requires supervision supine>sit bed mobility, MIN assist sit>supine bed mobility, MIN assist sit<>Stand w/ VCs for safety, MIN assist functional mobility w/ hand held assist, MIN assist toilet transfer, MIN-MOD assist LB ADLs, MIN assist UB ADLs 2* the following deficits impacting occupational performance: decreased strength and endurance, impaired balance, impaired activity tolerance, impaired STM, impaired insight and safety awareness  Pt to benefit from continued skilled OT tx while in the hospital to address deficits as defined above and maximize level of functional independence w ADL's and functional mobility  Occupational Performance areas to address include: grooming, bathing/shower, toilet hygiene, dressing, health maintenance, functional mobility and clothing management, formal cognitive assessment  From OT standpoint, recommendation at time of d/c would be short term rehab  Goals   Patient Goals none stated 2* cognition   LTG Time Frame 10-14   Long Term Goal please see below goals   Plan   Treatment Interventions ADL retraining;Functional transfer training;UE strengthening/ROM; Endurance training;Cognitive reorientation;Patient/family training;Equipment evaluation/education; Compensatory technique education; Activityengagement; Energy conservation   Goal Expiration Date 08/02/19   OT Frequency 3-5x/wk   Recommendation   OT Discharge Recommendation Short Term Rehab   OT - OK to Discharge   (to rehab when medically stable)   Barthel Index   Feeding 5   Bathing 0   Grooming Score 0   Dressing Score 5   Bladder Score 0   Bowels Score 5   Toilet Use Score 5   Transfers (Bed/Chair) Score 10   Mobility (Level Surface) Score 0   Stairs Score 0   B[place] Index Score 30   Modified [place] Scale   Modified [place] Scale 4     Occupational Therapy Goals to be met in 10-14 days:  1) Pt will improve activity tolerance to G for min 30 min txment sessions to enhance ADLs  2) Pt will complete ADLs/self care w/ supervision  3) Pt will complete toileting w/ supervision w/ G hygiene/thoroughness using DME PRN  4) Pt will improve functional transfers on/off all surfaces using DME PRN w/ G balance/safety including toileting w/ supervision  5) Pt will improve fx'l mobility during I/ADl/leisure tasks using DME PRN w/ g balance/safety w/ supervision  6) Pt will engage in ongoing cognitive assessment w/ G participation to A w/ safe d/c planning/recommendations  7) Pt will demonstrate G carryover of pt/caregiver education and training as appropriate w/ mod I  w/ G tolerance  8) Pt will demonstrate improved bed mobility to supervision to enhance ADLs  9) Pt will demonstrate improved standing tolerance to 3-5 minutes during functional tasks w/ Fair + balance to enhance ADL performance  10) Pt will demonstrate improved b/l UE strength by 1 MMT grade to enhance ADLS and functional transfers     Documentation completed by: Swati Cormier MS, OTR/L

## 2019-07-19 NOTE — PLAN OF CARE
Problem: OCCUPATIONAL THERAPY ADULT  Goal: Performs self-care activities at highest level of function for planned discharge setting  See evaluation for individualized goals  Description  Treatment Interventions: ADL retraining, Functional transfer training, UE strengthening/ROM, Endurance training, Cognitive reorientation, Patient/family training, Equipment evaluation/education, Compensatory technique education, Activityengagement, Energy conservation          See flowsheet documentation for full assessment, interventions and recommendations  Note:   Limitation: Decreased ADL status, Decreased UE strength, Decreased Safe judgement during ADL, Decreased cognition, Decreased endurance, Decreased high-level ADLs, Decreased self-care trans  Prognosis: Good  Assessment: Pt is a 74 y o  female seen for OT evaluation s/p admit to [facility] on 7/17/2019 w/ Ambulatory dysfunction, increased falls  Pt w/ x-ray pending reading R UE  CT head (No intracranial hemorrhage or territorial infarct is found)  Comorbidities affecting pt's functional performance at time of assessment include: frontal temporal dementia, HTN, OA  Personal factors affecting pt at time of IE include:impaired insight and safety awareness, aphasia  Prior to admission, pt was living w/ at [address] [place] [address] and facility is declining to take pt back and pt reports independent w/ dressing, supervision showers, independent w/ functional transfers and mobility w/ SPC, assist IADLs  Upon evaluation: Pt requires supervision supine>sit bed mobility, MIN assist sit>supine bed mobility, MIN assist sit<>Stand w/ VCs for safety, MIN assist functional mobility w/ hand held assist, MIN assist toilet transfer, MIN-MOD assist LB ADLs, MIN assist UB ADLs 2* the following deficits impacting occupational performance: decreased strength and endurance, impaired balance, impaired activity tolerance, impaired STM, impaired insight and safety awareness   Pt to benefit from continued skilled OT tx while in the hospital to address deficits as defined above and maximize level of functional independence w ADL's and functional mobility  Occupational Performance areas to address include: grooming, bathing/shower, toilet hygiene, dressing, health maintenance, functional mobility and clothing management, formal cognitive assessment  From OT standpoint, recommendation at time of d/c would be short term rehab       OT Discharge Recommendation: Short Term Rehab  OT - OK to Discharge: (to rehab when medically stable)

## 2019-07-19 NOTE — PLAN OF CARE
Problem: Potential for Falls  Goal: Patient will remain free of falls  Description  INTERVENTIONS:  - Assess patient frequently for physical needs  -  Identify cognitive and physical deficits and behaviors that affect risk of falls    -  [place] fall precautions as indicated by assessment   - Educate patient/family on patient safety including physical limitations  - Instruct patient to call for assistance with activity based on assessment  - Modify environment to reduce risk of injury  - Consider OT/PT consult to assist with strengthening/mobility  Outcome: Progressing     Problem: Prexisting or High Potential for Compromised Skin Integrity  Goal: Skin integrity is maintained or improved  Description  INTERVENTIONS:  - Identify patients at risk for skin breakdown  - Assess and monitor skin integrity  - Assess and monitor nutrition and hydration status  - Monitor labs (i e  albumin)  - Assess for incontinence   - Turn and reposition patient  - Assist with mobility/ambulation  - Relieve pressure over bony prominences  - Avoid friction and shearing  - Provide appropriate hygiene as needed including keeping skin clean and dry  - Evaluate need for skin moisturizer/barrier cream  - Collaborate with interdisciplinary team (i e  Nutrition, Rehabilitation, etc )   - Patient/family teaching  Outcome: Progressing     Problem: PAIN - ADULT  Goal: Verbalizes/displays adequate comfort level or baseline comfort level  Description  Interventions:  - Encourage patient to monitor pain and request assistance  - Assess pain using appropriate pain scale  - Administer analgesics based on type and severity of pain and evaluate response  - Implement non-pharmacological measures as appropriate and evaluate response  - Consider cultural and social influences on pain and pain management  - Notify physician/advanced practitioner if interventions unsuccessful or patient reports new pain  Outcome: Progressing     Problem: SAFETY ADULT  Goal: Patient will remain free of falls  Description  INTERVENTIONS:  - Assess patient frequently for physical needs  -  Identify cognitive and physical deficits and behaviors that affect risk of falls    -  [place] fall precautions as indicated by assessment   - Educate patient/family on patient safety including physical limitations  - Instruct patient to call for assistance with activity based on assessment  - Modify environment to reduce risk of injury  - Consider OT/PT consult to assist with strengthening/mobility  Outcome: Progressing  Goal: Maintain or return to baseline ADL function  Description  INTERVENTIONS:  -  Assess patient's ability to carry out ADLs; assess patient's baseline for ADL function and identify physical deficits which impact ability to perform ADLs (bathing, care of mouth/teeth, toileting, grooming, dressing, etc )  - Assess/evaluate cause of self-care deficits   - Assess range of motion  - Assess patient's mobility; develop plan if impaired  - Assess patient's need for assistive devices and provide as appropriate  - Encourage maximum independence but intervene and supervise when necessary  ¯ Involve family in performance of ADLs  ¯ Assess for home care needs following discharge   ¯ Request OT consult to assist with ADL evaluation and planning for discharge  ¯ Provide patient education as appropriate  Outcome: Progressing  Goal: Maintain or return mobility status to optimal level  Description  INTERVENTIONS:  - Assess patient's baseline mobility status (ambulation, transfers, stairs, etc )    - Identify cognitive and physical deficits and behaviors that affect mobility  - Identify mobility aids required to assist with transfers and/or ambulation (gait belt, sit-to-stand, lift, walker, cane, etc )  - [place] fall precautions as indicated by assessment  - Record patient progress and toleration of activity level on Mobility SBAR; progress patient to next Phase/Stage  - Instruct patient to call for assistance with activity based on assessment  - Request Rehabilitation consult to assist with strengthening/weightbearing, etc   Outcome: Progressing     Problem: DISCHARGE PLANNING  Goal: Discharge to home or other facility with appropriate resources  Description  INTERVENTIONS:  - Identify barriers to discharge w/patient and caregiver  - Arrange for needed discharge resources and transportation as appropriate  - Identify discharge learning needs (meds, wound care, etc )  - Arrange for interpretive services to assist at discharge as needed  - Refer to Case Management Department for coordinating discharge planning if the patient needs post-hospital services based on physician/advanced practitioner order or complex needs related to functional status, cognitive ability, or social support system  Outcome: Progressing     Problem: Knowledge Deficit  Goal: Patient/family/caregiver demonstrates understanding of disease process, treatment plan, medications, and discharge instructions  Description  Complete learning assessment and assess knowledge base  Interventions:  - Provide teaching at level of understanding  - Provide teaching via preferred learning methods  Outcome: Progressing     Problem: Nutrition/Hydration-ADULT  Goal: Nutrient/Hydration intake appropriate for improving, restoring or maintaining nutritional needs  Description  Monitor and assess patient's nutrition/hydration status for malnutrition (ex- brittle hair, bruises, dry skin, pale skin and conjunctiva, muscle wasting, smooth red tongue, and disorientation)  Collaborate with interdisciplinary team and initiate plan and interventions as ordered  Monitor patient's weight and dietary intake as ordered or per policy  Utilize nutrition screening tool and intervene per policy  Determine patient's food preferences and provide high-protein, high-caloric foods as appropriate       INTERVENTIONS:  - Monitor oral intake, urinary output, labs, and treatment plans  - Assess nutrition and hydration status and recommend course of action  - Evaluate amount of meals eaten  - Assist patient with eating if necessary   - Allow adequate time for meals  - Recommend/ encourage appropriate diets, oral nutritional supplements, and vitamin/mineral supplements  - Order, calculate, and assess calorie counts as needed  - Recommend, monitor, and adjust tube feedings and TPN/PPN based on assessed needs  - Assess need for intravenous fluids  - Provide specific nutrition/hydration education as appropriate  - Include patient/family/caregiver in decisions related to nutrition  Outcome: Progressing

## 2019-07-19 NOTE — UTILIZATION REVIEW
Continued Stay Review    Date: 7/19/19                     Current Patient Class: OBS  Current Level of Care: MED/SURG/TELE    HPI:73 y o  female initially admitted on 7/17 WITH AMBULATORY DYSFUNCTION    Assessment/Plan:   · Ambulatory dysfunction with recurrent falls of increasing frequency presents with several areas of bruising of her right shoulder right knee right periorbital ecchymosis no acute fracture/CT imaging showing no evidence of any hemorrhagic change and stable ventriculomegaly seen prior consistent with possible normal pressure hydrocephalus concurred that this is likely progression of this patient's already known history of dementia and [facility]/ [facility] facility refusing to take back and will need alternate extended care facility for placement case management consult placed/will consult PT/OT bed alarm and fall precautions  · Right shoulder contusion with surrounding ecchymoses will obtain right shoulder x-ray still pending official results but has range of motion assess with PT/OT  · Frontal temporal dementia without behavioral disturbance disoriented to all spheres continue Exelon/no signs of other etiology to or possible encephalopathy with normal chemistries and TSH/has had recent neurologic evaluations on consultation with no further interventions recommended and presents is likely progression of her dementia/ UA was normal on presentation here  · Benign essential hypertension continue lisinopril 5 mg daily    Pertinent Labs/Diagnostic Results:   Results from last 7 days   Lab Units 07/18/19  0505 07/17/19  1840   WBC Thousand/uL 5 56 9 72   HEMOGLOBIN g/dL 10 4* 11 7   HEMATOCRIT % 33 2* 36 4   PLATELETS Thousands/uL 389 441*   NEUTROS ABS Thousands/µL 3 21 7 03     Results from last 7 days   Lab Units 07/18/19  0505 07/17/19  1840   SODIUM mmol/L 142 141   POTASSIUM mmol/L 3 8 3 5   CHLORIDE mmol/L 109* 105   CO2 mmol/L 27 29   ANION GAP mmol/L 6 7   BUN mg/dL 21 27*   CREATININE mg/dL 0 59* 0 68   EGFR ml/min/1 73sq m 90 86   CALCIUM mg/dL 8 9 9 5     Results from last 7 days   Lab Units 07/17/19  2330   POC GLUCOSE mg/dl 153*     Results from last 7 days   Lab Units 07/18/19  0505 07/17/19  1840   GLUCOSE RANDOM mg/dL 94 105     Results from last 7 days   Lab Units 07/18/19  2141   CLARITY UA  Clear   COLOR UA  Yellow   SPEC GRAV UA  1 020   PH UA  7 0   GLUCOSE UA mg/dl Negative   KETONES UA mg/dl Negative   BLOOD UA  Negative   PROTEIN UA mg/dl Negative   NITRITE UA  Negative   BILIRUBIN UA  Negative   UROBILINOGEN UA E U /dl 0 2   LEUKOCYTES UA  Negative       Vital Signs:   Date/Time  Temp  Pulse  Resp  BP  SpO2  O2 Device  Patient Position - Orthostatic VS   07/19/19 0700  97 9 °F (36 6 °C)  79  18  118/66  97 %  None (Room air)  Lying   07/18/19 2255  97 7 °F (36 5 °C)  80  18  129/65  97 %  None (Room air)  Lying   07/18/19 1543  98 3 °F (36 8 °C)  73  18  163/72  100 %  None (Room air)  Sitting   07/18/19 0729  99 3 °F (37 4 °C)  81  18  143/67  98 %  None (Room air)  Lying       Medications:   Scheduled Meds:   Current Facility-Administered Medications:  acetaminophen 650 mg Oral Q6H PRN   artificial tear  Both Eyes HS   enoxaparin 40 mg Subcutaneous Daily   ENSURE ACTIVE HIGH PROTEIN 8 oz Oral BID   lisinopril 5 mg Oral Daily   rivastigmine 4 5 mg Oral Daily       Discharge Plan: TBD    Network Utilization Review Department  Phone: [phone]; Fax [phone]  [email or web address]  ATTENTION: Please call with any questions or concerns to [phone]  and carefully listen to the prompts so that you are directed to the right person  Send all requests for admission clinical reviews, approved or denied determinations and any other requests to fax [phone]   All voicemails are confidential

## 2019-07-19 NOTE — PROGRESS NOTES
[facility] Progress Note  Patient: Anjali Sequeira 74 y o  female   MRN: [id number]  PCP: Jayne Valentino MD  Unit/Bed#: E4 -39 Encounter: [id number]  Date Of Visit: 07/19/19    Assessment:    Principal Problem:    Ambulatory dysfunction  Active Problems:    Frontotemporal dementia without behavioral disturbance    Benign essential hypertension      Plan:    · Ambulatory dysfunction with recurrent falls of increasing frequency presents with several areas of bruising of her right shoulder right knee right periorbital ecchymosis no acute fracture/CT imaging showing no evidence of any hemorrhagic change and stable ventriculomegaly seen prior consistent with possible normal pressure hydrocephalus concurred that this is likely progression of this patient's already known history of dementia and [facility]/ [facility] facility refusing to take back and will need alternate extended care facility for placement case management consult placed/will consult PT/OT bed alarm and fall precautions  · Right shoulder contusion with surrounding ecchymoses will obtain right shoulder x-ray still pending official results but has range of motion assess with PT/OT  · Frontal temporal dementia without behavioral disturbance disoriented to all spheres continue Exelon/no signs of other etiology to or possible encephalopathy with normal chemistries and TSH/has had recent neurologic evaluations on consultation with no further interventions recommended and presents is likely progression of her dementia/ UA was normal on presentation here  · Benign essential hypertension continue lisinopril 5 mg daily      VTE Pharmacologic Prophylaxis:   Pharmacologic: Enoxaparin (Lovenox)  Mechanical VTE Prophylaxis in Place: Yes    Discussions with Specialists or Other Care Team Provider:  None    Time Spent for Care: 45 minutes  More than 50% of total time spent on counseling and coordination of care as described above        Subjective: Pleasant but confused poor historian seems to be normal or breakfast multiple areas of bruising noted over right periorbital right knee right shoulder    Objective:     Vitals:   Temp (24hrs), Av °F (36 7 °C), Min:97 7 °F (36 5 °C), Max:98 3 °F (36 8 °C)    Temp:  [97 7 °F (36 5 °C)-98 3 °F (36 8 °C)] 97 7 °F (36 5 °C)  HR:  [73-80] 80  Resp:  [18] 18  BP: (129-163)/(65-72) 129/65  SpO2:  [97 %-100 %] 97 %  Body mass index is 24 76 kg/m²  Input and Output Summary (last 24 hours): Intake/Output Summary (Last 24 hours) at 2019  Last data filed at 2019 2125  Gross per 24 hour   Intake 600 ml   Output 200 ml   Net 400 ml       Physical Exam:     Physical Exam:   General appearance: alert, slowed mentation, appears stated age and cooperative  Head: Normocephalic, without obvious abnormality, Old area of ecchymosis resolving right cheek area no palpable pain small laceration over right eyebrow with cicatrix  Lungs: clear to auscultation bilaterally  Heart: regular rate and rhythm  Abdomen: soft, non-tender; bowel sounds normal; no masses,  no organomegaly  Back: negative  Extremities: Right shoulder ecchymosis right knee ecchymosis range of motion intact in both  Neurologic: Mental status: orientation: time, date, person, place, president all abnormal      Additional Data:     Labs:    Results from last 7 days   Lab Units 19  0505   WBC Thousand/uL 5 56   HEMOGLOBIN g/dL 10 4*   HEMATOCRIT % 33 2*   PLATELETS Thousands/uL 389   NEUTROS PCT % 58   LYMPHS PCT % 28   MONOS PCT % 9   EOS PCT % 4     Results from last 7 days   Lab Units 19  0505   POTASSIUM mmol/L 3 8   CHLORIDE mmol/L 109*   CO2 mmol/L 27   BUN mg/dL 21   CREATININE mg/dL 0 59*   CALCIUM mg/dL 8 9           * I Have Reviewed All Lab Data Listed Above  * Additional Pertinent Lab Tests Reviewed:  All Labs For Current Hospital Admission Reviewed    Imaging:  Ct Head Without Contrast    Result Date: 2019  Narrative: CT BRAIN - WITHOUT CONTRAST INDICATION:   Superficial injury of head  COMPARISON:  Prior CT dated April 24, 2019  Prior brain MRI dated April 26, 2019  TECHNIQUE:  CT examination of the brain was performed  In addition to axial images, coronal 2D reformatted images were created and submitted for interpretation  Radiation dose length product (DLP) for this visit:  827 mGy-cm   This examination, like all CT scans performed in the [facility], was performed utilizing techniques to minimize radiation dose exposure, including the use of iterative reconstruction and automated exposure control  IMAGE QUALITY:  Diagnostic  FINDINGS: PARENCHYMA: Age-related central atrophy  Decreased attenuation is noted in periventricular and subcortical white matter demonstrating an appearance that is statistically most likely to represent mild microangiopathic change  No CT signs of acute infarction  No intracranial mass, mass effect or midline shift  No acute parenchymal hemorrhage  As seen previously: The cerebellar tonsils are low-lying, entering the foramen magnum and crowding the cervical cord and partially effacing the CSF spaces  VENTRICLES AND EXTRA-AXIAL SPACES:  Patient again exhibits ventriculomegaly which is stable from April  Left lateral ventricle slightly larger than the right however there is no midline shift  Cannot exclude normal pressure hydrocephalus  VISUALIZED ORBITS AND PARANASAL SINUSES:  Unremarkable  CALVARIUM AND EXTRACRANIAL SOFT TISSUES:  Normal      Impression: Stable ventriculomegaly out of proportion to the degree of sulcal dilatation  Normal pressure hydrocephalus is again not excluded by imaging  There are again low lying cerebellar tonsils which enter the foramen magnum crowding the cervical cord and partially effacing the CSF spaces  No intracranial hemorrhage or territorial infarct is found   Workstation performed: [id number]     Imaging Reports Reviewed Today Include:   Imaging Personally Reviewed by Myself Includes:    Procedure: Ct Head Without Contrast    Result Date: 7/17/2019  Narrative: CT BRAIN - WITHOUT CONTRAST INDICATION:   Superficial injury of head  COMPARISON:  Prior CT dated April 24, 2019  Prior brain MRI dated April 26, 2019  TECHNIQUE:  CT examination of the brain was performed  In addition to axial images, coronal 2D reformatted images were created and submitted for interpretation  Radiation dose length product (DLP) for this visit:  827 mGy-cm   This examination, like all CT scans performed in the [facility], was performed utilizing techniques to minimize radiation dose exposure, including the use of iterative reconstruction and automated exposure control  IMAGE QUALITY:  Diagnostic  FINDINGS: PARENCHYMA: Age-related central atrophy  Decreased attenuation is noted in periventricular and subcortical white matter demonstrating an appearance that is statistically most likely to represent mild microangiopathic change  No CT signs of acute infarction  No intracranial mass, mass effect or midline shift  No acute parenchymal hemorrhage  As seen previously: The cerebellar tonsils are low-lying, entering the foramen magnum and crowding the cervical cord and partially effacing the CSF spaces  VENTRICLES AND EXTRA-AXIAL SPACES:  Patient again exhibits ventriculomegaly which is stable from April  Left lateral ventricle slightly larger than the right however there is no midline shift  Cannot exclude normal pressure hydrocephalus  VISUALIZED ORBITS AND PARANASAL SINUSES:  Unremarkable  CALVARIUM AND EXTRACRANIAL SOFT TISSUES:  Normal      Impression: Stable ventriculomegaly out of proportion to the degree of sulcal dilatation  Normal pressure hydrocephalus is again not excluded by imaging  There are again low lying cerebellar tonsils which enter the foramen magnum crowding the cervical cord and partially effacing the CSF spaces   No intracranial hemorrhage or territorial infarct is found  Workstation performed: [id number]        Recent Cultures (last 7 days):           Last 24 Hours Medication List:     Current Facility-Administered Medications:  acetaminophen 650 mg Oral Q6H PRN Katie Freshwater, PA-C   artificial tear  Both Eyes HS Cleopatra Candy Gyarmaty, PA-C   enoxaparin 40 mg Subcutaneous Daily Cleopatra Candy Jocelyn, Dennise Sear   ENSURE ACTIVE HIGH PROTEIN 8 oz Oral BID Katie Freshwater, PA-C   lisinopril 5 mg Oral Daily Katie Freshwater, PA-C   rivastigmine 4 5 mg Oral Daily Katie Freshwater, PA-C        Today, Patient Was Seen By: Marlena Mason MD    ** Please Note: Dragon 360 Dictation voice to text software may have been used in the creation of this document   **

## 2019-07-19 NOTE — PLAN OF CARE
Problem: Potential for Falls  Goal: Patient will remain free of falls  Description  INTERVENTIONS:  - Assess patient frequently for physical needs  -  Identify cognitive and physical deficits and behaviors that affect risk of falls    -  [place] fall precautions as indicated by assessment   - Educate patient/family on patient safety including physical limitations  - Instruct patient to call for assistance with activity based on assessment  - Modify environment to reduce risk of injury  - Consider OT/PT consult to assist with strengthening/mobility  Outcome: Progressing     Problem: Prexisting or High Potential for Compromised Skin Integrity  Goal: Skin integrity is maintained or improved  Description  INTERVENTIONS:  - Identify patients at risk for skin breakdown  - Assess and monitor skin integrity  - Assess and monitor nutrition and hydration status  - Monitor labs (i e  albumin)  - Assess for incontinence   - Turn and reposition patient  - Assist with mobility/ambulation  - Relieve pressure over bony prominences  - Avoid friction and shearing  - Provide appropriate hygiene as needed including keeping skin clean and dry  - Evaluate need for skin moisturizer/barrier cream  - Collaborate with interdisciplinary team (i e  Nutrition, Rehabilitation, etc )   - Patient/family teaching  Outcome: Progressing     Problem: PAIN - ADULT  Goal: Verbalizes/displays adequate comfort level or baseline comfort level  Description  Interventions:  - Encourage patient to monitor pain and request assistance  - Assess pain using appropriate pain scale  - Administer analgesics based on type and severity of pain and evaluate response  - Implement non-pharmacological measures as appropriate and evaluate response  - Consider cultural and social influences on pain and pain management  - Notify physician/advanced practitioner if interventions unsuccessful or patient reports new pain  Outcome: Progressing     Problem: SAFETY ADULT  Goal: Patient will remain free of falls  Description  INTERVENTIONS:  - Assess patient frequently for physical needs  -  Identify cognitive and physical deficits and behaviors that affect risk of falls    -  [place] fall precautions as indicated by assessment   - Educate patient/family on patient safety including physical limitations  - Instruct patient to call for assistance with activity based on assessment  - Modify environment to reduce risk of injury  - Consider OT/PT consult to assist with strengthening/mobility  Outcome: Progressing  Goal: Maintain or return to baseline ADL function  Description  INTERVENTIONS:  -  Assess patient's ability to carry out ADLs; assess patient's baseline for ADL function and identify physical deficits which impact ability to perform ADLs (bathing, care of mouth/teeth, toileting, grooming, dressing, etc )  - Assess/evaluate cause of self-care deficits   - Assess range of motion  - Assess patient's mobility; develop plan if impaired  - Assess patient's need for assistive devices and provide as appropriate  - Encourage maximum independence but intervene and supervise when necessary  ¯ Involve family in performance of ADLs  ¯ Assess for home care needs following discharge   ¯ Request OT consult to assist with ADL evaluation and planning for discharge  ¯ Provide patient education as appropriate  Outcome: Progressing  Goal: Maintain or return mobility status to optimal level  Description  INTERVENTIONS:  - Assess patient's baseline mobility status (ambulation, transfers, stairs, etc )    - Identify cognitive and physical deficits and behaviors that affect mobility  - Identify mobility aids required to assist with transfers and/or ambulation (gait belt, sit-to-stand, lift, walker, cane, etc )  - [place] fall precautions as indicated by assessment  - Record patient progress and toleration of activity level on Mobility SBAR; progress patient to next Phase/Stage  - Instruct patient to call for assistance with activity based on assessment  - Request Rehabilitation consult to assist with strengthening/weightbearing, etc   Outcome: Progressing     Problem: DISCHARGE PLANNING  Goal: Discharge to home or other facility with appropriate resources  Description  INTERVENTIONS:  - Identify barriers to discharge w/patient and caregiver  - Arrange for needed discharge resources and transportation as appropriate  - Identify discharge learning needs (meds, wound care, etc )  - Arrange for interpretive services to assist at discharge as needed  - Refer to Case Management Department for coordinating discharge planning if the patient needs post-hospital services based on physician/advanced practitioner order or complex needs related to functional status, cognitive ability, or social support system  Outcome: Progressing     Problem: Knowledge Deficit  Goal: Patient/family/caregiver demonstrates understanding of disease process, treatment plan, medications, and discharge instructions  Description  Complete learning assessment and assess knowledge base  Interventions:  - Provide teaching at level of understanding  - Provide teaching via preferred learning methods  Outcome: Progressing     Problem: Nutrition/Hydration-ADULT  Goal: Nutrient/Hydration intake appropriate for improving, restoring or maintaining nutritional needs  Description  Monitor and assess patient's nutrition/hydration status for malnutrition (ex- brittle hair, bruises, dry skin, pale skin and conjunctiva, muscle wasting, smooth red tongue, and disorientation)  Collaborate with interdisciplinary team and initiate plan and interventions as ordered  Monitor patient's weight and dietary intake as ordered or per policy  Utilize nutrition screening tool and intervene per policy  Determine patient's food preferences and provide high-protein, high-caloric foods as appropriate       INTERVENTIONS:  - Monitor oral intake, urinary output, labs, and treatment plans  - Assess nutrition and hydration status and recommend course of action  - Evaluate amount of meals eaten  - Assist patient with eating if necessary   - Allow adequate time for meals  - Recommend/ encourage appropriate diets, oral nutritional supplements, and vitamin/mineral supplements  - Order, calculate, and assess calorie counts as needed  - Recommend, monitor, and adjust tube feedings and TPN/PPN based on assessed needs  - Assess need for intravenous fluids  - Provide specific nutrition/hydration education as appropriate  - Include patient/family/caregiver in decisions related to nutrition  Outcome: Progressing

## 2019-07-19 NOTE — SOCIAL WORK
TCF does not have any beds available and cannot accept the pt  SW called pt's daughter/POA Annette Check, informed her of this  Jeanann Check is agreeable to referrals within the [facility] to see who has beds available  Annette Check lives in [place] and requested we contact her Fred Arambula ([phone]) with final SNF choice

## 2019-07-20 LAB
FERRITIN SERPL-MCNC: 50 NG/ML (ref 8–388)
IRON SATN MFR SERPL: 17 %
IRON SERPL-MCNC: 47 UG/DL (ref 50–170)
TIBC SERPL-MCNC: 279 UG/DL (ref 250–450)

## 2019-07-20 PROCEDURE — 82652 VIT D 1 25-DIHYDROXY: CPT | Performed by: INTERNAL MEDICINE

## 2019-07-20 PROCEDURE — 82728 ASSAY OF FERRITIN: CPT | Performed by: INTERNAL MEDICINE

## 2019-07-20 PROCEDURE — 83540 ASSAY OF IRON: CPT | Performed by: INTERNAL MEDICINE

## 2019-07-20 PROCEDURE — 99225 PR SBSQ OBSERVATION CARE/DAY 25 MINUTES: CPT | Performed by: INTERNAL MEDICINE

## 2019-07-20 PROCEDURE — 83550 IRON BINDING TEST: CPT | Performed by: INTERNAL MEDICINE

## 2019-07-20 RX ADMIN — ENOXAPARIN SODIUM 40 MG: 40 INJECTION SUBCUTANEOUS at 09:01

## 2019-07-20 RX ADMIN — RIVASTIGMINE TARTRATE 4.5 MG: 3 CAPSULE ORAL at 09:01

## 2019-07-20 RX ADMIN — Medication 8 OZ: at 17:01

## 2019-07-20 RX ADMIN — Medication 8 OZ: at 09:02

## 2019-07-20 RX ADMIN — LISINOPRIL 5 MG: 5 TABLET ORAL at 09:01

## 2019-07-20 RX ADMIN — WHITE PETROLATUM 57.7 %-MINERAL OIL 31.9 % EYE OINTMENT: at 21:41

## 2019-07-20 NOTE — UTILIZATION REVIEW
Continued Stay Review    Date: 7/202/109                  Current Patient Class: OBS  Current Level of Care: [facility]    HPI:73 y o  female initially admitted on 7/17 WITH AMBULATORY DYSFUNCTION    Assessment/Plan: Pleasant, confused  Po meds   multiple areas of bruising noted over right periorbital right knee right shoulder  CM following for STR placement    Pertinent Labs/Diagnostic Results:   Results from last 7 days   Lab Units 07/18/19  0505 07/17/19  1840   WBC Thousand/uL 5 56 9 72   HEMOGLOBIN g/dL 10 4* 11 7   HEMATOCRIT % 33 2* 36 4   PLATELETS Thousands/uL 389 441*   NEUTROS ABS Thousands/µL 3 21 7 03     Results from last 7 days   Lab Units 07/18/19  0505 07/17/19  1840   SODIUM mmol/L 142 141   POTASSIUM mmol/L 3 8 3 5   CHLORIDE mmol/L 109* 105   CO2 mmol/L 27 29   ANION GAP mmol/L 6 7   BUN mg/dL 21 27*   CREATININE mg/dL 0 59* 0 68   EGFR ml/min/1 73sq m 90 86   CALCIUM mg/dL 8 9 9 5     Results from last 7 days   Lab Units 07/17/19  2330   POC GLUCOSE mg/dl 153*     Results from last 7 days   Lab Units 07/18/19  0505 07/17/19  1840   GLUCOSE RANDOM mg/dL 94 105     Results from last 7 days   Lab Units 07/18/19  2141   CLARITY UA  Clear   COLOR UA  Yellow   SPEC GRAV UA  1 020   PH UA  7 0   GLUCOSE UA mg/dl Negative   KETONES UA mg/dl Negative   BLOOD UA  Negative   PROTEIN UA mg/dl Negative   NITRITE UA  Negative   BILIRUBIN UA  Negative   UROBILINOGEN UA E U /dl 0 2   LEUKOCYTES UA  Negative     Vital Signs:   07/20/19 1629  97 4 °F (36 3 °C)  73  18  93/58  98 % None (Room air) Lying   07/20/19 0700  98 4 °F (36 9 °C)  79  18  144/88  97 % None (Room air) Lying   07/19/19 2315  98 4 °F (36 9 °C)  81  17  143/65  96 % None (Room air) Lying       Medications:   Scheduled Meds:   Current Facility-Administered Medications:  acetaminophen 650 mg Oral Q6H PRN    artificial tear  Both Eyes HS    enoxaparin 40 mg Subcutaneous Daily    ENSURE ACTIVE HIGH PROTEIN 8 oz Oral BID    lisinopril 5 mg Oral Daily rivastigmine 4 5 mg Oral Daily        Discharge Plan: [address] [facility]etal Utilization Review Department  Phone: [phone]; Fax [phone]  [email or web address]  ATTENTION: Please call with any questions or concerns to [phone]  and carefully listen to the prompts so that you are directed to the right person  Send all requests for admission clinical reviews, approved or denied determinations and any other requests to fax [phone]   All voicemails are confidential

## 2019-07-20 NOTE — PROGRESS NOTES
[facility] Progress Note  Patient: Cassie Sky 74 y o  female   MRN: [id number]  PCP: Vanessa Branham MD  Unit/Bed#: E4 -01 Encounter: [id number]  Date Of Visit: 07/20/19    Assessment:    Principal Problem:    Ambulatory dysfunction  Active Problems:    Frontotemporal dementia without behavioral disturbance    Benign essential hypertension      Plan:    · Ambulatory dysfunction with recurrent falls of increasing frequency presents with several areas of bruising of her right shoulder right knee right periorbital ecchymosis no acute fracture/CT  Brain imaging showing no evidence of any hemorrhagic change and stable ventriculomegaly seen prior consistent with possible normal pressure hydrocephalus concurred that this is likely progression of this patient's already known history of dementia and NPH/ [facility] facility refusing to take back and will need alternate extended care facility for placement case management consult placed/will consult PT/OT bed alarm and fall precautions  · Right shoulder contusion with surrounding ecchymoses will obtain right shoulder x-ray still pending official results but has range of motion assess with PT/OT/ check Vit D level  · Frontal temporal dementia without behavioral disturbance disoriented to all spheres continue Exelon/no signs of other etiology to or possible encephalopathy with normal chemistries and TSH/has had recent neurologic evaluations on consultation with no further interventions recommended and presents is likely progression of her dementia/ UA was normal on presentation here  · Benign essential hypertension continue lisinopril 5 mg daily  · Anemia/ check iron panel , stool OB      VTE Pharmacologic Prophylaxis:   Pharmacologic: Enoxaparin (Lovenox)  Mechanical VTE Prophylaxis in Place: Yes    Discussions with Specialists or Other Care Team Provider:  None    Time Spent for Care: 45 minutes    More than 50% of total time spent on counseling and coordination of care as described above  Subjective:   Pleasant but confused poor historian seems to be normal or breakfast multiple areas of bruising noted over right periorbital right knee right shoulder    Objective:     Vitals:   Temp (24hrs), Av 2 °F (36 8 °C), Min:98 °F (36 7 °C), Max:98 4 °F (36 9 °C)    Temp:  [98 °F (36 7 °C)-98 4 °F (36 9 °C)] 98 4 °F (36 9 °C)  HR:  [76-81] 81  Resp:  [17-18] 17  BP: (123-143)/(62-65) 143/65  SpO2:  [96 %-98 %] 96 %  Body mass index is 24 76 kg/m²  Input and Output Summary (last 24 hours): Intake/Output Summary (Last 24 hours) at 2019 0754  Last data filed at 2019 1500  Gross per 24 hour   Intake 840 ml   Output 700 ml   Net 140 ml       Physical Exam:     Physical Exam:   General appearance: alert, slowed mentation, appears stated age and cooperative  Head: Normocephalic, without obvious abnormality, Old area of ecchymosis resolving right cheek area no palpable pain small laceration over right eyebrow with cicatrix  Lungs: clear to auscultation bilaterally  Heart: regular rate and rhythm  Abdomen: soft, non-tender; bowel sounds normal; no masses,  no organomegaly  Back: negative  Extremities: Right shoulder ecchymosis right knee ecchymosis range of motion intact in both  Neurologic: Mental status: orientation: time, date, person, place, president all abnormal      Additional Data:     Labs:    Results from last 7 days   Lab Units 19  0505   WBC Thousand/uL 5 56   HEMOGLOBIN g/dL 10 4*   HEMATOCRIT % 33 2*   PLATELETS Thousands/uL 389   NEUTROS PCT % 58   LYMPHS PCT % 28   MONOS PCT % 9   EOS PCT % 4     Results from last 7 days   Lab Units 19  0505   POTASSIUM mmol/L 3 8   CHLORIDE mmol/L 109*   CO2 mmol/L 27   BUN mg/dL 21   CREATININE mg/dL 0 59*   CALCIUM mg/dL 8 9           * I Have Reviewed All Lab Data Listed Above  * Additional Pertinent Lab Tests Reviewed:  [facility] Admission Reviewed    Imaging:  Ct Head Without Contrast    Result Date: 7/17/2019  Narrative: CT BRAIN - WITHOUT CONTRAST INDICATION:   Superficial injury of head  COMPARISON:  Prior CT dated April 24, 2019  Prior brain MRI dated April 26, 2019  TECHNIQUE:  CT examination of the brain was performed  In addition to axial images, coronal 2D reformatted images were created and submitted for interpretation  Radiation dose length product (DLP) for this visit:  827 mGy-cm   This examination, like all CT scans performed in the [facility], was performed utilizing techniques to minimize radiation dose exposure, including the use of iterative reconstruction and automated exposure control  IMAGE QUALITY:  Diagnostic  FINDINGS: PARENCHYMA: Age-related central atrophy  Decreased attenuation is noted in periventricular and subcortical white matter demonstrating an appearance that is statistically most likely to represent mild microangiopathic change  No CT signs of acute infarction  No intracranial mass, mass effect or midline shift  No acute parenchymal hemorrhage  As seen previously: The cerebellar tonsils are low-lying, entering the foramen magnum and crowding the cervical cord and partially effacing the CSF spaces  VENTRICLES AND EXTRA-AXIAL SPACES:  Patient again exhibits ventriculomegaly which is stable from April  Left lateral ventricle slightly larger than the right however there is no midline shift  Cannot exclude normal pressure hydrocephalus  VISUALIZED ORBITS AND PARANASAL SINUSES:  Unremarkable  CALVARIUM AND EXTRACRANIAL SOFT TISSUES:  Normal      Impression: Stable ventriculomegaly out of proportion to the degree of sulcal dilatation  Normal pressure hydrocephalus is again not excluded by imaging  There are again low lying cerebellar tonsils which enter the foramen magnum crowding the cervical cord and partially effacing the CSF spaces  No intracranial hemorrhage or territorial infarct is found  Workstation performed: [id number]     Imaging Reports Reviewed Today Include:   Imaging Personally Reviewed by Myself Includes:    Procedure: Ct Head Without Contrast    Result Date: 7/17/2019  Narrative: CT BRAIN - WITHOUT CONTRAST INDICATION:   Superficial injury of head  COMPARISON:  Prior CT dated April 24, 2019  Prior brain MRI dated April 26, 2019  TECHNIQUE:  CT examination of the brain was performed  In addition to axial images, coronal 2D reformatted images were created and submitted for interpretation  Radiation dose length product (DLP) for this visit:  827 mGy-cm   This examination, like all CT scans performed in the [facility], was performed utilizing techniques to minimize radiation dose exposure, including the use of iterative reconstruction and automated exposure control  IMAGE QUALITY:  Diagnostic  FINDINGS: PARENCHYMA: Age-related central atrophy  Decreased attenuation is noted in periventricular and subcortical white matter demonstrating an appearance that is statistically most likely to represent mild microangiopathic change  No CT signs of acute infarction  No intracranial mass, mass effect or midline shift  No acute parenchymal hemorrhage  As seen previously: The cerebellar tonsils are low-lying, entering the foramen magnum and crowding the cervical cord and partially effacing the CSF spaces  VENTRICLES AND EXTRA-AXIAL SPACES:  Patient again exhibits ventriculomegaly which is stable from April  Left lateral ventricle slightly larger than the right however there is no midline shift  Cannot exclude normal pressure hydrocephalus  VISUALIZED ORBITS AND PARANASAL SINUSES:  Unremarkable  CALVARIUM AND EXTRACRANIAL SOFT TISSUES:  Normal      Impression: Stable ventriculomegaly out of proportion to the degree of sulcal dilatation  Normal pressure hydrocephalus is again not excluded by imaging   There are again low lying cerebellar tonsils which enter the foramen magnum crowding the cervical cord and partially effacing the CSF spaces  No intracranial hemorrhage or territorial infarct is found  Workstation performed: [id number]        Recent Cultures (last 7 days):           Last 24 Hours Medication List:     Current Facility-Administered Medications:  acetaminophen 650 mg Oral Q6H PRN Wishek Baldinesh, PA-C   artificial tear  [facility] Eyes [facility], PA-C   enoxaparin 40 mg Subcutaneous Daily [place], [place]   ENSURE ACTIVE HIGH PROTEIN 8 oz Oral BID Mimi Preciado PA-C   lisinopril 5 mg Oral Daily Mimi Preciado PA-ROBINSON   rivastigmine 4 5 mg Oral Daily Mimi Preciado PA-C        Today, Patient Was Seen By: Rekha Starr MD    ** Please Note: Dragon 360 Dictation voice to text software may have been used in the creation of this document   **

## 2019-07-21 LAB
ANION GAP SERPL CALCULATED.3IONS-SCNC: 7 MMOL/L (ref 4–13)
BUN SERPL-MCNC: 19 MG/DL (ref 5–25)
CALCIUM SERPL-MCNC: 9 MG/DL (ref 8.3–10.1)
CHLORIDE SERPL-SCNC: 108 MMOL/L (ref 100–108)
CO2 SERPL-SCNC: 27 MMOL/L (ref 21–32)
CREAT SERPL-MCNC: 0.75 MG/DL (ref 0.6–1.3)
GFR SERPL CREATININE-BSD FRML MDRD: 78 ML/MIN/1.73SQ M
GLUCOSE P FAST SERPL-MCNC: 99 MG/DL (ref 65–99)
GLUCOSE SERPL-MCNC: 99 MG/DL (ref 65–140)
POTASSIUM SERPL-SCNC: 4.3 MMOL/L (ref 3.5–5.3)
SODIUM SERPL-SCNC: 142 MMOL/L (ref 136–145)

## 2019-07-21 PROCEDURE — 99225 PR SBSQ OBSERVATION CARE/DAY 25 MINUTES: CPT | Performed by: INTERNAL MEDICINE

## 2019-07-21 PROCEDURE — 80048 BASIC METABOLIC PNL TOTAL CA: CPT | Performed by: INTERNAL MEDICINE

## 2019-07-21 RX ADMIN — WHITE PETROLATUM 57.7 %-MINERAL OIL 31.9 % EYE OINTMENT: at 21:46

## 2019-07-21 RX ADMIN — RIVASTIGMINE TARTRATE 4.5 MG: 3 CAPSULE ORAL at 09:06

## 2019-07-21 RX ADMIN — LISINOPRIL 5 MG: 5 TABLET ORAL at 09:06

## 2019-07-21 RX ADMIN — Medication 8 OZ: at 17:07

## 2019-07-21 RX ADMIN — ENOXAPARIN SODIUM 40 MG: 40 INJECTION SUBCUTANEOUS at 09:06

## 2019-07-21 RX ADMIN — Medication 8 OZ: at 09:06

## 2019-07-21 NOTE — PLAN OF CARE
Problem: Potential for Falls  Goal: Patient will remain free of falls  Description  INTERVENTIONS:  - Assess patient frequently for physical needs  -  Identify cognitive and physical deficits and behaviors that affect risk of falls    -  [place] fall precautions as indicated by assessment   - Educate patient/family on patient safety including physical limitations  - Instruct patient to call for assistance with activity based on assessment  - Modify environment to reduce risk of injury  - Consider OT/PT consult to assist with strengthening/mobility  Outcome: Progressing     Problem: Prexisting or High Potential for Compromised Skin Integrity  Goal: Skin integrity is maintained or improved  Description  INTERVENTIONS:  - Identify patients at risk for skin breakdown  - Assess and monitor skin integrity  - Assess and monitor nutrition and hydration status  - Monitor labs (i e  albumin)  - Assess for incontinence   - Turn and reposition patient  - Assist with mobility/ambulation  - Relieve pressure over bony prominences  - Avoid friction and shearing  - Provide appropriate hygiene as needed including keeping skin clean and dry  - Evaluate need for skin moisturizer/barrier cream  - Collaborate with interdisciplinary team (i e  Nutrition, Rehabilitation, etc )   - Patient/family teaching  Outcome: Progressing     Problem: PAIN - ADULT  Goal: Verbalizes/displays adequate comfort level or baseline comfort level  Description  Interventions:  - Encourage patient to monitor pain and request assistance  - Assess pain using appropriate pain scale  - Administer analgesics based on type and severity of pain and evaluate response  - Implement non-pharmacological measures as appropriate and evaluate response  - Consider cultural and social influences on pain and pain management  - Notify physician/advanced practitioner if interventions unsuccessful or patient reports new pain  Outcome: Progressing     Problem: SAFETY ADULT  Goal: Patient will remain free of falls  Description  INTERVENTIONS:  - Assess patient frequently for physical needs  -  Identify cognitive and physical deficits and behaviors that affect risk of falls    -  [place] fall precautions as indicated by assessment   - Educate patient/family on patient safety including physical limitations  - Instruct patient to call for assistance with activity based on assessment  - Modify environment to reduce risk of injury  - Consider OT/PT consult to assist with strengthening/mobility  Outcome: Progressing  Goal: Maintain or return to baseline ADL function  Description  INTERVENTIONS:  -  Assess patient's ability to carry out ADLs; assess patient's baseline for ADL function and identify physical deficits which impact ability to perform ADLs (bathing, care of mouth/teeth, toileting, grooming, dressing, etc )  - Assess/evaluate cause of self-care deficits   - Assess range of motion  - Assess patient's mobility; develop plan if impaired  - Assess patient's need for assistive devices and provide as appropriate  - Encourage maximum independence but intervene and supervise when necessary  ¯ Involve family in performance of ADLs  ¯ Assess for home care needs following discharge   ¯ Request OT consult to assist with ADL evaluation and planning for discharge  ¯ Provide patient education as appropriate  Outcome: Progressing  Goal: Maintain or return mobility status to optimal level  Description  INTERVENTIONS:  - Assess patient's baseline mobility status (ambulation, transfers, stairs, etc )    - Identify cognitive and physical deficits and behaviors that affect mobility  - Identify mobility aids required to assist with transfers and/or ambulation (gait belt, sit-to-stand, lift, walker, cane, etc )  - [place] fall precautions as indicated by assessment  - Record patient progress and toleration of activity level on Mobility SBAR; progress patient to next Phase/Stage  - Instruct patient to call for assistance with activity based on assessment  - Request Rehabilitation consult to assist with strengthening/weightbearing, etc   Outcome: Progressing     Problem: DISCHARGE PLANNING  Goal: Discharge to home or other facility with appropriate resources  Description  INTERVENTIONS:  - Identify barriers to discharge w/patient and caregiver  - Arrange for needed discharge resources and transportation as appropriate  - Identify discharge learning needs (meds, wound care, etc )  - Arrange for interpretive services to assist at discharge as needed  - Refer to Case Management Department for coordinating discharge planning if the patient needs post-hospital services based on physician/advanced practitioner order or complex needs related to functional status, cognitive ability, or social support system  Outcome: Progressing     Problem: Knowledge Deficit  Goal: Patient/family/caregiver demonstrates understanding of disease process, treatment plan, medications, and discharge instructions  Description  Complete learning assessment and assess knowledge base  Interventions:  - Provide teaching at level of understanding  - Provide teaching via preferred learning methods  Outcome: Progressing     Problem: Nutrition/Hydration-ADULT  Goal: Nutrient/Hydration intake appropriate for improving, restoring or maintaining nutritional needs  Description  Monitor and assess patient's nutrition/hydration status for malnutrition (ex- brittle hair, bruises, dry skin, pale skin and conjunctiva, muscle wasting, smooth red tongue, and disorientation)  Collaborate with interdisciplinary team and initiate plan and interventions as ordered  Monitor patient's weight and dietary intake as ordered or per policy  Utilize nutrition screening tool and intervene per policy  Determine patient's food preferences and provide high-protein, high-caloric foods as appropriate       INTERVENTIONS:  - Monitor oral intake, urinary output, labs, and treatment plans  - Assess nutrition and hydration status and recommend course of action  - Evaluate amount of meals eaten  - Assist patient with eating if necessary   - Allow adequate time for meals  - Recommend/ encourage appropriate diets, oral nutritional supplements, and vitamin/mineral supplements  - Order, calculate, and assess calorie counts as needed  - Recommend, monitor, and adjust tube feedings and TPN/PPN based on assessed needs  - Assess need for intravenous fluids  - Provide specific nutrition/hydration education as appropriate  - Include patient/family/caregiver in decisions related to nutrition  Outcome: Progressing     Problem: NEUROSENSORY - ADULT  Goal: Achieves maximal functionality and self care  Description  INTERVENTIONS  - Monitor swallowing and airway patency with patient fatigue and changes in neurological status  - Encourage and assist patient to increase activity and self care with guidance from rehab services  - Encourage visually impaired, hearing impaired and aphasic patients to use assistive/communication devices  Outcome: Progressing     Problem: SKIN/TISSUE INTEGRITY - ADULT  Goal: Skin integrity remains intact  Description  INTERVENTIONS  - Identify patients at risk for skin breakdown  - Assess and monitor skin integrity  - Assess and monitor nutrition and hydration status  - Monitor labs (i e  albumin)  - Assess for incontinence   - Turn and reposition patient  - Assist with mobility/ambulation  - Relieve pressure over bony prominences  - Avoid friction and shearing  - Provide appropriate hygiene as needed including keeping skin clean and dry  - Evaluate need for skin moisturizer/barrier cream  - Collaborate with interdisciplinary team (i e  Nutrition, Rehabilitation, etc )   - Patient/family teaching  Outcome: Progressing

## 2019-07-21 NOTE — PROGRESS NOTES
[facility] Progress Note  Patient: Luz Maria Roth 74 y o  female   MRN: [id number]  PCP: Bri Juarez MD  Unit/Bed#: E4 -01 Encounter: [id number]  Date Of Visit: 07/21/19    Assessment:    Principal Problem:    Ambulatory dysfunction  Active Problems:    Frontotemporal dementia without behavioral disturbance    Benign essential hypertension      Plan:    · Ambulatory dysfunction with recurrent falls of increasing frequency presents with several areas of bruising of her right shoulder right knee right periorbital ecchymosis no acute fracture/CT  Brain imaging showing no evidence of any hemorrhagic change and stable ventriculomegaly seen prior consistent with possible normal pressure hydrocephalus concurred that this is likely progression of this patient's already known history of dementia and [facility] facility refusing to take back and will need alternate extended care facility for placement case management consult placed/will consult PT/OT bed alarm and fall precautions  · Right shoulder contusion with surrounding ecchymoses will obtain right shoulder x-ray still pending official results(ordered on7/18) but has range of motion assess with PT/OT/ check Vit D level  · Frontal temporal dementia without behavioral disturbance disoriented to all spheres continue Exelon/no signs of other etiology to or possible encephalopathy with normal chemistries and TSH/has had recent neurologic evaluations on consultation with no further interventions recommended and presents is likely progression of her dementia/ UA was normal on presentation here  · Benign essential hypertension continue lisinopril 5 mg daily  · Anemia/ check iron panel , stool OB      VTE Pharmacologic Prophylaxis:   Pharmacologic: Enoxaparin (Lovenox)  Mechanical VTE Prophylaxis in Place: Yes    Discussions with Specialists or Other Care Team Provider:  None    Time Spent for Care: 45 minutes    More than 50% of total time spent on counseling and coordination of care as described above  Subjective:   Pleasant but confused poor historian seems to be normal or breakfast multiple areas of bruising noted over right periorbital right knee right shoulder    Objective:     Vitals:   Temp (24hrs), Av 6 °F (36 4 °C), Min:97 4 °F (36 3 °C), Max:97 8 °F (36 6 °C)    Temp:  [97 4 °F (36 3 °C)-97 8 °F (36 6 °C)] 97 8 °F (36 6 °C)  HR:  [73-80] 80  Resp:  [17-18] 18  BP: ()/(53-58) 114/53  SpO2:  [96 %-98 %] 96 %  Body mass index is 24 76 kg/m²  Input and Output Summary (last 24 hours):     No intake or output data in the 24 hours ending 19 1010    Physical Exam:     Physical Exam:   General appearance: alert, slowed mentation, appears stated age and cooperative  Head: Normocephalic, without obvious abnormality, Old area of ecchymosis resolving right cheek area no palpable pain small laceration over right eyebrow with cicatrix  Lungs: clear to auscultation bilaterally  Heart: regular rate and rhythm  Abdomen: soft, non-tender; bowel sounds normal; no masses,  no organomegaly  Back: negative  Extremities: Right shoulder ecchymosis right knee ecchymosis range of motion intact in both  Neurologic: Mental status: orientation: time, date, person, place, president all abnormal      Additional Data:     Labs:    Results from last 7 days   Lab Units 19  0505   WBC Thousand/uL 5 56   HEMOGLOBIN g/dL 10 4*   HEMATOCRIT % 33 2*   PLATELETS Thousands/uL 389   NEUTROS PCT % 58   LYMPHS PCT % 28   MONOS PCT % 9   EOS PCT % 4     Results from last 7 days   Lab Units 19  0559   POTASSIUM mmol/L 4 3   CHLORIDE mmol/L 108   CO2 mmol/L 27   BUN mg/dL 19   CREATININE mg/dL 0 75   CALCIUM mg/dL 9 0           * I Have Reviewed All Lab Data Listed Above  * Additional Pertinent Lab Tests Reviewed:  All Labs For Current Hospital Admission Reviewed    Imaging:  Ct Head Without Contrast    Result Date: 2019  Narrative: CT BRAIN - WITHOUT CONTRAST INDICATION:   Superficial injury of head  COMPARISON:  Prior CT dated April 24, 2019  Prior brain MRI dated April 26, 2019  TECHNIQUE:  CT examination of the brain was performed  In addition to axial images, coronal 2D reformatted images were created and submitted for interpretation  Radiation dose length product (DLP) for this visit:  827 mGy-cm   This examination, like all CT scans performed in the [facility], was performed utilizing techniques to minimize radiation dose exposure, including the use of iterative reconstruction and automated exposure control  IMAGE QUALITY:  Diagnostic  FINDINGS: PARENCHYMA: Age-related central atrophy  Decreased attenuation is noted in periventricular and subcortical white matter demonstrating an appearance that is statistically most likely to represent mild microangiopathic change  No CT signs of acute infarction  No intracranial mass, mass effect or midline shift  No acute parenchymal hemorrhage  As seen previously: The cerebellar tonsils are low-lying, entering the foramen magnum and crowding the cervical cord and partially effacing the CSF spaces  VENTRICLES AND EXTRA-AXIAL SPACES:  Patient again exhibits ventriculomegaly which is stable from April  Left lateral ventricle slightly larger than the right however there is no midline shift  Cannot exclude normal pressure hydrocephalus  VISUALIZED ORBITS AND PARANASAL SINUSES:  Unremarkable  CALVARIUM AND EXTRACRANIAL SOFT TISSUES:  Normal      Impression: Stable ventriculomegaly out of proportion to the degree of sulcal dilatation  Normal pressure hydrocephalus is again not excluded by imaging  There are again low lying cerebellar tonsils which enter the foramen magnum crowding the cervical cord and partially effacing the CSF spaces  No intracranial hemorrhage or territorial infarct is found   Workstation performed: [id number]     Imaging Reports Reviewed Today Include:   Imaging Personally Reviewed by Myself Includes:    Procedure: Ct Head Without Contrast    Result Date: 7/17/2019  Narrative: CT BRAIN - WITHOUT CONTRAST INDICATION:   Superficial injury of head  COMPARISON:  Prior CT dated April 24, 2019  Prior brain MRI dated April 26, 2019  TECHNIQUE:  CT examination of the brain was performed  In addition to axial images, coronal 2D reformatted images were created and submitted for interpretation  Radiation dose length product (DLP) for this visit:  827 mGy-cm   This examination, like all CT scans performed in the [facility], was performed utilizing techniques to minimize radiation dose exposure, including the use of iterative reconstruction and automated exposure control  IMAGE QUALITY:  Diagnostic  FINDINGS: PARENCHYMA: Age-related central atrophy  Decreased attenuation is noted in periventricular and subcortical white matter demonstrating an appearance that is statistically most likely to represent mild microangiopathic change  No CT signs of acute infarction  No intracranial mass, mass effect or midline shift  No acute parenchymal hemorrhage  As seen previously: The cerebellar tonsils are low-lying, entering the foramen magnum and crowding the cervical cord and partially effacing the CSF spaces  VENTRICLES AND EXTRA-AXIAL SPACES:  Patient again exhibits ventriculomegaly which is stable from April  Left lateral ventricle slightly larger than the right however there is no midline shift  Cannot exclude normal pressure hydrocephalus  VISUALIZED ORBITS AND PARANASAL SINUSES:  Unremarkable  CALVARIUM AND EXTRACRANIAL SOFT TISSUES:  Normal      Impression: Stable ventriculomegaly out of proportion to the degree of sulcal dilatation  Normal pressure hydrocephalus is again not excluded by imaging  There are again low lying cerebellar tonsils which enter the foramen magnum crowding the cervical cord and partially effacing the CSF spaces   No intracranial hemorrhage or territorial infarct is found  Workstation performed: [id number]        Recent Cultures (last 7 days):           Last 24 Hours Medication List:     Current Facility-Administered Medications:  acetaminophen 650 mg Oral Q6H PRN Peggy Dapper, PA-C   artificial tear  Both Eyes HS Lyn Green Gyarmaty, PA-C   enoxaparin 40 mg Subcutaneous Daily Lyn Green [place], [place]   ENSURE ACTIVE HIGH PROTEIN 8 oz Oral BID Peggy Dapper, PA-C   lisinopril 5 mg Oral Daily Peggy Dapper, PA-C   rivastigmine 4 5 mg Oral Daily Peggy Dapper, PA-C        Today, Patient Was Seen By: Ofelia Velazco MD    ** Please Note: Dragon 360 Dictation voice to text software may have been used in the creation of this document   **

## 2019-07-21 NOTE — UTILIZATION REVIEW
Continued Stay Review    Date: 7/21/2019                         Current Patient Class:  obs  [facility]    HPI:73 y o  female initially admitted on 7/17  @  2025     Assessment/Plan:  On 7/20  -  facility (where pt resided [facility])  refusing to take back and will need alternate extended care facility for placement case management consult placed/will consult PT/OT bed alarm and fall precautions    On 7/21  Right shoulder contusion with surrounding ecchymoses ;  7/21  XRAY  Showed   No acute osseous abnormality  PT/OT cont working w/pt      Pertinent Labs/Diagnostic Results:   Iron level on 7/20 was 47     Vital Signs:   07/21/19 0818  97 8 °F (36 6 °C)  80  18  114/53   Pertinent Labs/Diagnostic Results:   Results from last 7 days   Lab Units 07/18/19  0505 07/17/19  1840   WBC Thousand/uL 5 56 9 72   HEMOGLOBIN g/dL 10 4* 11 7   HEMATOCRIT % 33 2* 36 4   PLATELETS Thousands/uL 389 441*   NEUTROS ABS Thousands/µL 3 21 7 03         Results from last 7 days   Lab Units 07/21/19  0559 07/18/19  0505 07/17/19  1840   SODIUM mmol/L 142 142 141   POTASSIUM mmol/L 4 3 3 8 3 5   CHLORIDE mmol/L 108 109* 105   CO2 mmol/L 27 27 29   ANION GAP mmol/L 7 6 7   BUN mg/dL 19 21 27*   CREATININE mg/dL 0 75 0 59* 0 68   EGFR ml/min/1 73sq m 78 90 86   CALCIUM mg/dL 9 0 8 9 9 5         Results from last 7 days   Lab Units 07/17/19  2330   POC GLUCOSE mg/dl 153*     Results from last 7 days   Lab Units 07/21/19  0559 07/18/19  0505 07/17/19  1840   GLUCOSE RANDOM mg/dL 99 94 105         Results from last 7 days   Lab Units 07/20/19  1146   FERRITIN ng/mL 50         Results from last 7 days   Lab Units 07/18/19  2141   CLARITY UA  Clear   COLOR UA  Yellow   SPEC GRAV UA  1 020   PH UA  7 0   GLUCOSE UA mg/dl Negative   KETONES UA mg/dl Negative   BLOOD UA  Negative   PROTEIN UA mg/dl Negative   NITRITE UA  Negative   BILIRUBIN UA  Negative   UROBILINOGEN UA E U /dl 0 2   LEUKOCYTES UA  Negative       Vital Signs: 07/21/19 0818  97 8 °F (36 6 °C)  80  18  114/53  96 %   07/20/19 2355  97 6 °F (36 4 °C)  78  17  117/55       Medications:   Scheduled Meds:   Current Facility-Administered Medications:  acetaminophen 650 mg Oral Q6H PRN   artificial tear  Both Eyes HS   enoxaparin 40 mg Subcutaneous Daily   ENSURE ACTIVE HIGH PROTEIN 8 oz Oral BID   lisinopril 5 mg Oral Daily   rivastigmine 4 5 mg Oral Daily       Discharge Plan: tbd    Network Utilization Review Department  Phone: [phone]; Fax [phone]  [email or web address]  ATTENTION: Please call with any questions or concerns to [phone]  and carefully listen to the prompts so that you are directed to the right person  Send all requests for admission clinical reviews, approved or denied determinations and any other requests to fax [phone]   All voicemails are confidential

## 2019-07-22 PROBLEM — S40.011A CONTUSION OF MULTIPLE SITES OF RIGHT SHOULDER: Status: ACTIVE | Noted: 2019-07-22

## 2019-07-22 PROCEDURE — 97530 THERAPEUTIC ACTIVITIES: CPT

## 2019-07-22 PROCEDURE — 97110 THERAPEUTIC EXERCISES: CPT

## 2019-07-22 PROCEDURE — 99225 PR SBSQ OBSERVATION CARE/DAY 25 MINUTES: CPT | Performed by: FAMILY MEDICINE

## 2019-07-22 PROCEDURE — 97535 SELF CARE MNGMENT TRAINING: CPT

## 2019-07-22 PROCEDURE — 97116 GAIT TRAINING THERAPY: CPT

## 2019-07-22 RX ADMIN — Medication 8 OZ: at 17:24

## 2019-07-22 RX ADMIN — Medication 8 OZ: at 09:04

## 2019-07-22 RX ADMIN — RIVASTIGMINE TARTRATE 4.5 MG: 3 CAPSULE ORAL at 09:03

## 2019-07-22 RX ADMIN — ENOXAPARIN SODIUM 40 MG: 40 INJECTION SUBCUTANEOUS at 09:03

## 2019-07-22 RX ADMIN — LISINOPRIL 5 MG: 5 TABLET ORAL at 09:03

## 2019-07-22 RX ADMIN — WHITE PETROLATUM 57.7 %-MINERAL OIL 31.9 % EYE OINTMENT: at 21:25

## 2019-07-22 NOTE — UTILIZATION REVIEW
Continued Stay Review    Date: 7/22/2019                       Current Patient Class: OBS Current Level of Care: [facility]    HPI:73 y o  female initially admitted on 7/17  @  2025     Assessment/Plan: Pt  Unable to return to detention due to falls  Continue PT / OT   Awaiting Rehab placement    Pertinent Labs/Diagnostic Results:   Results from last 7 days   Lab Units 07/18/19  0505 07/17/19  1840   WBC Thousand/uL 5 56 9 72   HEMOGLOBIN g/dL 10 4* 11 7   HEMATOCRIT % 33 2* 36 4   PLATELETS Thousands/uL 389 441*   NEUTROS ABS Thousands/µL 3 21 7 03     Results from last 7 days   Lab Units 07/21/19  0559 07/18/19  0505 07/17/19  1840   SODIUM mmol/L 142 142 141   POTASSIUM mmol/L 4 3 3 8 3 5   CHLORIDE mmol/L 108 109* 105   CO2 mmol/L 27 27 29   ANION GAP mmol/L 7 6 7   BUN mg/dL 19 21 27*   CREATININE mg/dL 0 75 0 59* 0 68   EGFR ml/min/1 73sq m 78 90 86   CALCIUM mg/dL 9 0 8 9 9 5         Results from last 7 days   Lab Units 07/17/19  2330   POC GLUCOSE mg/dl 153*     Results from last 7 days   Lab Units 07/21/19  0559 07/18/19  0505 07/17/19  1840   GLUCOSE RANDOM mg/dL 99 94 105     Results from last 7 days   Lab Units 07/20/19  1146   FERRITIN ng/mL 50     Results from last 7 days   Lab Units 07/18/19  2141   CLARITY UA  Clear   COLOR UA  Yellow   SPEC GRAV UA  1 020   PH UA  7 0   GLUCOSE UA mg/dl Negative   KETONES UA mg/dl Negative   BLOOD UA  Negative   PROTEIN UA mg/dl Negative   NITRITE UA  Negative   BILIRUBIN UA  Negative   UROBILINOGEN UA E U /dl 0 2   LEUKOCYTES UA  Negative     Vital Signs:   /22/19 1100  97 4 °F (36 3 °C)Abnormal   109   16  98/65    None (Room air)  Sitting   07/22/19 0700  97 8 °F (36 6 °C)  83  16  128/65  100 %  None (Room air)  Lying       Medications:   Scheduled Meds:   Current Facility-Administered Medications:  acetaminophen 650 mg Oral Q6H PRN    artificial tear  Both Eyes HS    enoxaparin 40 mg Subcutaneous Daily    ENSURE ACTIVE HIGH PROTEIN 8 oz Oral BID    lisinopril 5 mg Oral Daily    rivastigmine 4 5 mg Oral Daily        Discharge Plan: Accepted @ [facility] Utilization Review Department  Phone: [phone]; Fax [phone]  [email or web address]  ATTENTION: Please call with any questions or concerns to [phone]  and carefully listen to the prompts so that you are directed to the right person  Send all requests for admission clinical reviews, approved or denied determinations and any other requests to fax [phone]   All voicemails are confidential

## 2019-07-22 NOTE — PHYSICAL THERAPY NOTE
Physical Therapy Treatment Note     07/22/19 1115   Pain Assessment   Pain Assessment No/denies pain   Pain Score No Pain   Restrictions/Precautions   Other Precautions Chair Alarm;Cognitive; Fall Risk   General   Chart Reviewed Yes   Additional Pertinent History shoulder x- ray negative for fracture  Family/Caregiver Present No   Cognition   Overall Cognitive Status Impaired   Arousal/Participation Cooperative; Alert   Attention Attends with cues to redirect   Orientation Level Oriented to person;Oriented to place; Disoriented to time;Disoriented to situation   Memory Decreased recall of precautions;Decreased recall of recent events;Decreased short term memory   Following Commands Follows one step commands with increased time or repetition   Subjective   Subjective Pt out of bed in chair  Pt offers no c/o pain  PT agreeable to PT  Transfers   Sit to Stand 4  Minimal assistance   Additional items Assist x 1; Armrests; Increased time required;Verbal cues   Stand to Sit 4  Minimal assistance   Additional items Assist x 1; Armrests; Increased time required;Verbal cues   Additional Comments cues for handplacement and safety  Ambulation/Elevation   Gait pattern Forward Flexion;Narrow MER; Short stride   Gait Assistance 4  Minimal assist   Additional items Assist x 1;Verbal cues   Assistive Device Rolling walker   Distance 125'x2   Balance   Static Sitting Good   Dynamic Sitting Fair +   Static Standing Fair   Dynamic Standing Poor +   Ambulatory Poor +   Activity Tolerance   Medical Staff Made Aware [place] AGUIRRE   Nurse Made Aware yes   Exercises   Hip Flexion Sitting;10 reps;AROM; Bilateral   Hip Abduction Sitting;10 reps;AROM; Bilateral   Hip Adduction Sitting;10 reps;AROM; Bilateral  (isometric hip add   )   Knee AROM Long Arc Quad Sitting;10 reps;AROM; Bilateral   Ankle Pumps Supine;10 reps;AROM; Bilateral   Marching Sitting;10 reps;AROM; Bilateral   Assessment   Prognosis Fair   Problem List Decreased strength;Decreased endurance; Impaired balance;Decreased mobility; Decreased cognition;Decreased safety awareness; Impaired judgement   Assessment Pt demonstrates poor safety awareness, during all aspects of mobility due to increased distractibility and poor safety judgement  Pt requires verbal cues for safe approach to chair with cues to maintain use of Rw at all times and to reach back for chair prior to sitting  Pt progressed with ambulation distances to 125' x2 with min assist, increased distractiblity during gait training requiring verbal cues to maintain focus to task at hand  Pt tends to let go of walker when ambulating requiring verbal cues for safe mobility techniques  Pt remains at increased risk for falls  Pt performs seated b/l le arom exercises with verbal and tactile cues for proper exercise techniques and performance  Pt remained seated out of bed I chair, chair alarm activated  Recommend STR and would benefit from mor supervised enviornment given hx of dementia and significant risk for falls  Goals   Patient Goals none stated   STG Expiration Date 07/28/19   Treatment Day 1   Plan   Treatment/Interventions Functional transfer training;LE strengthening/ROM; Therapeutic exercise; Endurance training;Patient/family training;Equipment eval/education; Bed mobility;Gait training;Spoke to nursing;OT   Progress Progressing toward goals   PT Frequency 2-3x/wk   Recommendation   Recommendation Short-term skilled PT   PT - OK to Discharge Yes  (to appropriate level of care when medically cleared   )        07/22/19 1115   Pain Assessment   Pain Assessment No/denies pain   Pain Score No Pain   Restrictions/Precautions   Other Precautions Chair Alarm;Cognitive; Fall Risk   General   Chart Reviewed Yes   Additional Pertinent History shoulder x- ray negative for fracture  Family/Caregiver Present No   Cognition   Overall Cognitive Status Impaired   Arousal/Participation Cooperative; Alert   Attention Attends with cues to redirect   Orientation Level Oriented to person;Oriented to place; Disoriented to time;Disoriented to situation   Memory Decreased recall of precautions;Decreased recall of recent events;Decreased short term memory   Following Commands Follows one step commands with increased time or repetition   Subjective   Subjective Pt out of bed in chair  Pt offers no c/o pain  PT agreeable to PT  Transfers   Sit to Stand 4  Minimal assistance   Additional items Assist x 1; Armrests; Increased time required;Verbal cues   Stand to Sit 4  Minimal assistance   Additional items Assist x 1; Armrests; Increased time required;Verbal cues   Additional Comments cues for handplacement and safety  Ambulation/Elevation   Gait pattern Forward Flexion;Narrow MER; Short stride   Gait Assistance 4  Minimal assist   Additional items Assist x 1;Verbal cues   Assistive Device Rolling walker   Distance 125'x2   Balance   Static Sitting Good   Dynamic Sitting Fair +   Static Standing Fair   Dynamic Standing Poor +   Ambulatory Poor +   Activity Tolerance   Medical Staff Made Aware [place] AGUIRRE   Nurse Made Aware yes   Exercises   Hip Flexion Sitting;10 reps;AROM; Bilateral   Hip Abduction Sitting;10 reps;AROM; Bilateral   Hip Adduction Sitting;10 reps;AROM; Bilateral  (isometric hip add   )   Knee AROM Long Arc Quad Sitting;10 reps;AROM; Bilateral   Ankle Pumps Supine;10 reps;AROM; Bilateral   Marching Sitting;10 reps;AROM; Bilateral   Assessment   Prognosis Fair   Problem List Decreased strength;Decreased endurance; Impaired balance;Decreased mobility; Decreased cognition;Decreased safety awareness; Impaired judgement   Assessment Pt demonstrates poor safety awareness, during all aspects of mobility due to increased distractibility and poor safety judgement  Pt requires verbal cues for safe approach to chair with cues to maintain use of Rw at all times and to reach back for chair prior to sitting    Pt progressed with ambulation distances to 125' x2 with min assist, increased distractiblity during gait training requiring verbal cues to maintain focus to task at hand  Pt tends to let go of walker when ambulating requiring verbal cues for safe mobility techniques  Pt remains at increased risk for falls  Pt performs seated b/l le arom exercises with verbal and tactile cues for proper exercise techniques and performance  Pt remained seated out of bed I chair, chair alarm activated  Recommend STR and would benefit from mor supervised enviornment given hx of dementia and significant risk for falls  Goals   Patient Goals none stated   STG Expiration Date 07/28/19   Treatment Day 1   Plan   Treatment/Interventions Functional transfer training;LE strengthening/ROM; Therapeutic exercise; Endurance training;Patient/family training;Equipment eval/education; Bed mobility;Gait training;Spoke to nursing;OT   Progress Progressing toward goals   PT Frequency 2-3x/wk   Recommendation   Recommendation Short-term skilled PT   PT - OK to Discharge Yes  (to appropriate level of care when medically cleared   )       Alannah Craig, PTA

## 2019-07-22 NOTE — SOCIAL WORK
CM submitted therapy notes to Elina Bernardo, liaison at [facility], who is submitting for insurance auth  Once SNF auth is obtained, CM dept will arrange remainder of dc planning

## 2019-07-22 NOTE — PLAN OF CARE
Problem: OCCUPATIONAL THERAPY ADULT  Goal: Performs self-care activities at highest level of function for planned discharge setting  See evaluation for individualized goals  Description  Treatment Interventions: ADL retraining, Functional transfer training, UE strengthening/ROM, Endurance training, Cognitive reorientation, Patient/family training, Equipment evaluation/education, Compensatory technique education, Activityengagement, Energy conservation          See flowsheet documentation for full assessment, interventions and recommendations  Outcome: Progressing  Note:   Limitation: Decreased ADL status, Decreased UE strength, Decreased Safe judgement during ADL, Decreased cognition, Decreased endurance, Decreased high-level ADLs, Decreased self-care trans  Prognosis: Good  Assessment: Pt was seen for skilled OT with focus on completion of self care tasks, functional mobility, review of fall prevention and review of current plan of care  See above levels of A required for all functional tasks  Pt pleasant and cooperative through out tx session  Pt may benefit from further rehab with focus on achieving optimal performance levels with all functional tasks   Continue to recommend Inpatient rehab     OT Discharge Recommendation: Short Term Rehab  OT - OK to Discharge: (to rehab when medically stable)

## 2019-07-22 NOTE — ASSESSMENT & PLAN NOTE
Right shoulder x-ray shows no acute fracture or dislocation  Right shoulder contusion show surrounding areas of ecchymosis  Continue PT/OT  Vitamin D level pending

## 2019-07-22 NOTE — PLAN OF CARE
Problem: PHYSICAL THERAPY ADULT  Goal: Performs mobility at highest level of function for planned discharge setting  See evaluation for individualized goals  Description  Treatment/Interventions: Functional transfer training, LE strengthening/ROM, Therapeutic exercise, Endurance training, Patient/family training, Equipment eval/education, Bed mobility, Gait training, Compensatory technique education, Continued evaluation, Spoke to nursing  Equipment Recommended: (monitor )       See flowsheet documentation for full assessment, interventions and recommendations  7/22/2019 1223 by Elroy Connors PTA  Outcome: Progressing  Note:   Prognosis: Fair  Problem List: Decreased strength, Decreased endurance, Impaired balance, Decreased mobility, Decreased cognition, Decreased safety awareness, Impaired judgement  Assessment: Pt demonstrates poor safety awareness, during all aspects of mobility due to increased distractibility and poor safety judgement  Pt requires verbal cues for safe approach to chair with cues to maintain use of Rw at all times and to reach back for chair prior to sitting  Pt progressed with ambulation distances to 125' x2 with min assist, increased distractiblity during gait training requiring verbal cues to maintain focus to task at hand  Pt tends to let go of walker when ambulating requiring verbal cues for safe mobility techniques  Pt remains at increased risk for falls  Pt performs seated b/l le arom exercises with verbal and tactile cues for proper exercise techniques and performance  Pt remained seated out of bed I chair, chair alarm activated  Recommend STR and would benefit from mor supervised enviornment given hx of dementia and significant risk for falls  Recommendation: Short-term skilled PT     PT - OK to Discharge: Yes(to appropriate level of care when medically cleared   )    See flowsheet documentation for full assessment

## 2019-07-22 NOTE — ASSESSMENT & PLAN NOTE
Patient initially presented from [facility] with recurrent falls of increasing frequency  She presented with several areas of bruising on her right shoulder, right knee, presence of right periorbital ecchymosis  There was no acute fracture on CT  Brain imaging showed no evidence of hemorrhagic change and presence of stable ventriculomegaly which was seen on prior imaging studies consistent with possible normal pressure hydrocephalus  Patient likely has progression of her already known history of dementia and NPH  [facility] refused to take the patient back  Patient will need alternate extended care facility for placement-case management has been consulted  Continue PT/OT consult  Continue bed alarm and fall precautions  Patient is currently awaiting rehab placement

## 2019-07-22 NOTE — PROGRESS NOTES
Progress Note - Litzy Berry 1945, 74 y o  female MRN: [id number]    Unit/Bed#: E4 -01 Encounter: [id number]    Primary Care Provider: Sangita Parish MD   Date and time admitted to hospital: 7/17/2019  5:38 PM        * Ambulatory dysfunction  Assessment & Plan  Patient initially presented from [facility] with recurrent falls of increasing frequency  She presented with several areas of bruising on her right shoulder, right knee, presence of right periorbital ecchymosis  There was no acute fracture on CT  Brain imaging showed no evidence of hemorrhagic change and presence of stable ventriculomegaly which was seen on prior imaging studies consistent with possible normal pressure hydrocephalus  Patient likely has progression of her already known history of dementia and NPH  [facility] refused to take the patient back  Patient will need alternate extended care facility for placement-case management has been consulted  Continue PT/OT consult  Continue bed alarm and fall precautions  Patient is currently awaiting rehab placement  Contusion of multiple sites of right shoulder  Assessment & Plan  Right shoulder x-ray shows no acute fracture or dislocation  Right shoulder contusion show surrounding areas of ecchymosis  Continue PT/OT  Vitamin D level pending  Frontotemporal dementia without behavioral disturbance  Assessment & Plan  Patient has no behavioral disturbance  Patient is currently disoriented and is oriented only to person  Continue to maintain normal sleep/wake cycle  Benign essential hypertension  Assessment & Plan  Blood pressure currently stable and optimal   Continue lisinopril 5 mg daily  VTE Pharmacologic Prophylaxis:   Pharmacologic: Enoxaparin (Lovenox)  Mechanical VTE Prophylaxis in Place: No    Patient Centered Rounds: I have performed bedside rounds with nursing staff today      Discussions with Specialists or Other Care Team Provider:  Yes    Education and Discussions with Family / Patient:  Yes    Time Spent for Care: 15 minutes  More than 50% of total time spent on counseling and coordination of care as described above  Current Length of Stay: 0 day(s)    Current Patient Status: Observation   Certification Statement: The patient will continue to require additional inpatient hospital stay due to Awaiting rehab placement    Discharge Plan: To rehab when bed available    Code Status: Level 1 - Full Code      Subjective:   Patient has no complaints  There were no acute events overnight  Objective:     Vitals:   Temp (24hrs), Av 1 °F (36 7 °C), Min:97 4 °F (36 3 °C), Max:98 8 °F (37 1 °C)    Temp:  [97 4 °F (36 3 °C)-98 8 °F (37 1 °C)] 97 4 °F (36 3 °C)  HR:  [] 109  Resp:  [16] 16  BP: ()/(58-82) 98/65  SpO2:  [94 %-100 %] 100 %  Body mass index is 24 76 kg/m²  Input and Output Summary (last 24 hours): Intake/Output Summary (Last 24 hours) at 2019 1200  Last data filed at 2019 0558  Gross per 24 hour   Intake 449 ml   Output 860 ml   Net -411 ml       Physical Exam:     Physical Exam   Constitutional: She appears well-developed and well-nourished  No distress  HENT:   Head: Normocephalic and atraumatic  Eyes: Pupils are equal, round, and reactive to light  EOM are normal    Neck: No JVD present  Cardiovascular: Normal rate, regular rhythm and normal heart sounds  No murmur heard  Pulmonary/Chest: Effort normal and breath sounds normal    Abdominal: Soft  Bowel sounds are normal  There is no tenderness  There is no guarding  Musculoskeletal:   Right shoulder tenderness  Neurological: She is alert  Skin: Skin is warm and dry  She is not diaphoretic     Ecchymosis over right shoulder       Additional Data:     Labs:    Results from last 7 days   Lab Units 19  0505   WBC Thousand/uL 5 56   HEMOGLOBIN g/dL 10 4*   HEMATOCRIT % 33 2*   PLATELETS Thousands/uL 389 NEUTROS PCT % 58   LYMPHS PCT % 28   MONOS PCT % 9   EOS PCT % 4     Results from last 7 days   Lab Units 07/21/19  0559   SODIUM mmol/L 142   POTASSIUM mmol/L 4 3   CHLORIDE mmol/L 108   CO2 mmol/L 27   BUN mg/dL 19   CREATININE mg/dL 0 75   ANION GAP mmol/L 7   CALCIUM mg/dL 9 0   GLUCOSE RANDOM mg/dL 99         Results from last 7 days   Lab Units 07/17/19  2330   POC GLUCOSE mg/dl 153*                   * I Have Reviewed All Lab Data Listed Above  * Additional Pertinent Lab Tests Reviewed: Ciro 66 Admission Reviewed    Imaging:    Imaging Reports Reviewed Today Include:  Right shoulder x-ray  Imaging Personally Reviewed by Myself Includes:  None    Recent Cultures (last 7 days):           Last 24 Hours Medication List:     Current Facility-Administered Medications:  acetaminophen 650 mg Oral Q6H PRN Gayland [place], PA-C   artificial tear  Both Eyes HS [facility], PA-C   enoxaparin 40 mg Subcutaneous Daily [place], [place]   ENSURE ACTIVE HIGH PROTEIN 8 oz Oral BID [facility]   lisinopril 5 mg Oral Daily [facility], PA-C   rivastigmine 4 5 mg Oral Daily Gayland [place], PA-C        Today, Patient Was Seen By: Roma Canales MD    ** Please Note: Dictation voice to text software may have been used in the creation of this document   **

## 2019-07-22 NOTE — ASSESSMENT & PLAN NOTE
Patient has no behavioral disturbance  Patient is currently disoriented and is oriented only to person  Continue to maintain normal sleep/wake cycle

## 2019-07-22 NOTE — PLAN OF CARE
Problem: PHYSICAL THERAPY ADULT  Goal: Performs mobility at highest level of function for planned discharge setting  See evaluation for individualized goals  Description  Treatment/Interventions: Functional transfer training, LE strengthening/ROM, Therapeutic exercise, Endurance training, Patient/family training, Equipment eval/education, Bed mobility, Gait training, Compensatory technique education, Continued evaluation, Spoke to nursing  Equipment Recommended: (monitor )       See flowsheet documentation for full assessment, interventions and recommendations  Outcome: Progressing  Note:   Prognosis: Fair  Problem List: Decreased strength, Decreased endurance, Impaired balance, Decreased mobility, Decreased cognition, Impaired judgement, Decreased safety awareness  Assessment: Pt is 75 y/o female with hx of frontotemporal dementia and aphasia, presents with multiple falls and ambulatory dysfunction  Resides in [facility] at [facility] facility unable to allow for pt to return  PT consulted to assist with d/c planning and recommendations  Prior to admission resides at [facility]  States use of cane  Pt unreliable historian  Significant increase in falls noted in history  Currently presents with functional limitations related to impairments in strength, balance, cognition, activity tolerance and overall mobility  Min A needed for transfers and ambulation with hand held assistance  Will avoid WB to RUE until xrays completed, however denies pain  Gait with inconsistencies in pattern and lateral sway  Will likely benefit from trial of RW to optimize stability  Will benefit from ongoing PT in order to address impairments as well as reduce fall risk as well as more supervised environment given hx of dementia and significant risk for falls  PT will follow and progress          Recommendation: Short-term skilled PT, 24 hour supervision/assist     PT - OK to Discharge: (yes to appropriate level of care when stable, no to correction)    See flowsheet documentation for full assessment

## 2019-07-22 NOTE — PLAN OF CARE
Problem: Potential for Falls  Goal: Patient will remain free of falls  Description  INTERVENTIONS:  - Assess patient frequently for physical needs  -  Identify cognitive and physical deficits and behaviors that affect risk of falls    -  [place] fall precautions as indicated by assessment   - Educate patient/family on patient safety including physical limitations  - Instruct patient to call for assistance with activity based on assessment  - Modify environment to reduce risk of injury  - Consider OT/PT consult to assist with strengthening/mobility  Outcome: Progressing     Problem: Prexisting or High Potential for Compromised Skin Integrity  Goal: Skin integrity is maintained or improved  Description  INTERVENTIONS:  - Identify patients at risk for skin breakdown  - Assess and monitor skin integrity  - Assess and monitor nutrition and hydration status  - Monitor labs (i e  albumin)  - Assess for incontinence   - Turn and reposition patient  - Assist with mobility/ambulation  - Relieve pressure over bony prominences  - Avoid friction and shearing  - Provide appropriate hygiene as needed including keeping skin clean and dry  - Evaluate need for skin moisturizer/barrier cream  - Collaborate with interdisciplinary team (i e  Nutrition, Rehabilitation, etc )   - Patient/family teaching  Outcome: Progressing     Problem: PAIN - ADULT  Goal: Verbalizes/displays adequate comfort level or baseline comfort level  Description  Interventions:  - Encourage patient to monitor pain and request assistance  - Assess pain using appropriate pain scale  - Administer analgesics based on type and severity of pain and evaluate response  - Implement non-pharmacological measures as appropriate and evaluate response  - Consider cultural and social influences on pain and pain management  - Notify physician/advanced practitioner if interventions unsuccessful or patient reports new pain  Outcome: Progressing     Problem: SAFETY ADULT  Goal: Patient will remain free of falls  Description  INTERVENTIONS:  - Assess patient frequently for physical needs  -  Identify cognitive and physical deficits and behaviors that affect risk of falls    -  [place] fall precautions as indicated by assessment   - Educate patient/family on patient safety including physical limitations  - Instruct patient to call for assistance with activity based on assessment  - Modify environment to reduce risk of injury  - Consider OT/PT consult to assist with strengthening/mobility  Outcome: Progressing  Goal: Maintain or return to baseline ADL function  Description  INTERVENTIONS:  -  Assess patient's ability to carry out ADLs; assess patient's baseline for ADL function and identify physical deficits which impact ability to perform ADLs (bathing, care of mouth/teeth, toileting, grooming, dressing, etc )  - Assess/evaluate cause of self-care deficits   - Assess range of motion  - Assess patient's mobility; develop plan if impaired  - Assess patient's need for assistive devices and provide as appropriate  - Encourage maximum independence but intervene and supervise when necessary  ¯ Involve family in performance of ADLs  ¯ Assess for home care needs following discharge   ¯ Request OT consult to assist with ADL evaluation and planning for discharge  ¯ Provide patient education as appropriate  Outcome: Progressing  Goal: Maintain or return mobility status to optimal level  Description  INTERVENTIONS:  - Assess patient's baseline mobility status (ambulation, transfers, stairs, etc )    - Identify cognitive and physical deficits and behaviors that affect mobility  - Identify mobility aids required to assist with transfers and/or ambulation (gait belt, sit-to-stand, lift, walker, cane, etc )  - [place] fall precautions as indicated by assessment  - Record patient progress and toleration of activity level on Mobility SBAR; progress patient to next Phase/Stage  - Instruct patient to call for assistance with activity based on assessment  - Request Rehabilitation consult to assist with strengthening/weightbearing, etc   Outcome: Progressing     Problem: DISCHARGE PLANNING  Goal: Discharge to home or other facility with appropriate resources  Description  INTERVENTIONS:  - Identify barriers to discharge w/patient and caregiver  - Arrange for needed discharge resources and transportation as appropriate  - Identify discharge learning needs (meds, wound care, etc )  - Arrange for interpretive services to assist at discharge as needed  - Refer to Case Management Department for coordinating discharge planning if the patient needs post-hospital services based on physician/advanced practitioner order or complex needs related to functional status, cognitive ability, or social support system  Outcome: Progressing     Problem: Knowledge Deficit  Goal: Patient/family/caregiver demonstrates understanding of disease process, treatment plan, medications, and discharge instructions  Description  Complete learning assessment and assess knowledge base  Interventions:  - Provide teaching at level of understanding  - Provide teaching via preferred learning methods  Outcome: Progressing     Problem: Nutrition/Hydration-ADULT  Goal: Nutrient/Hydration intake appropriate for improving, restoring or maintaining nutritional needs  Description  Monitor and assess patient's nutrition/hydration status for malnutrition (ex- brittle hair, bruises, dry skin, pale skin and conjunctiva, muscle wasting, smooth red tongue, and disorientation)  Collaborate with interdisciplinary team and initiate plan and interventions as ordered  Monitor patient's weight and dietary intake as ordered or per policy  Utilize nutrition screening tool and intervene per policy  Determine patient's food preferences and provide high-protein, high-caloric foods as appropriate       INTERVENTIONS:  - Monitor oral intake, urinary output, labs, and treatment plans  - Assess nutrition and hydration status and recommend course of action  - Evaluate amount of meals eaten  - Assist patient with eating if necessary   - Allow adequate time for meals  - Recommend/ encourage appropriate diets, oral nutritional supplements, and vitamin/mineral supplements  - Order, calculate, and assess calorie counts as needed  - Recommend, monitor, and adjust tube feedings and TPN/PPN based on assessed needs  - Assess need for intravenous fluids  - Provide specific nutrition/hydration education as appropriate  - Include patient/family/caregiver in decisions related to nutrition  Outcome: Progressing     Problem: NEUROSENSORY - ADULT  Goal: Achieves maximal functionality and self care  Description  INTERVENTIONS  - Monitor swallowing and airway patency with patient fatigue and changes in neurological status  - Encourage and assist patient to increase activity and self care with guidance from rehab services  - Encourage visually impaired, hearing impaired and aphasic patients to use assistive/communication devices  Outcome: Progressing     Problem: SKIN/TISSUE INTEGRITY - ADULT  Goal: Skin integrity remains intact  Description  INTERVENTIONS  - Identify patients at risk for skin breakdown  - Assess and monitor skin integrity  - Assess and monitor nutrition and hydration status  - Monitor labs (i e  albumin)  - Assess for incontinence   - Turn and reposition patient  - Assist with mobility/ambulation  - Relieve pressure over bony prominences  - Avoid friction and shearing  - Provide appropriate hygiene as needed including keeping skin clean and dry  - Evaluate need for skin moisturizer/barrier cream  - Collaborate with interdisciplinary team (i e  Nutrition, Rehabilitation, etc )   - Patient/family teaching  Outcome: Progressing

## 2019-07-23 LAB — 1,25(OH)2D3 SERPL-MCNC: 54.1 PG/ML (ref 19.9–79.3)

## 2019-07-23 PROCEDURE — 99225 PR SBSQ OBSERVATION CARE/DAY 25 MINUTES: CPT | Performed by: FAMILY MEDICINE

## 2019-07-23 RX ADMIN — ENOXAPARIN SODIUM 40 MG: 40 INJECTION SUBCUTANEOUS at 08:56

## 2019-07-23 RX ADMIN — Medication 8 OZ: at 08:57

## 2019-07-23 RX ADMIN — RIVASTIGMINE TARTRATE 4.5 MG: 3 CAPSULE ORAL at 08:56

## 2019-07-23 RX ADMIN — Medication 8 OZ: at 17:05

## 2019-07-23 RX ADMIN — WHITE PETROLATUM 57.7 %-MINERAL OIL 31.9 % EYE OINTMENT: at 21:20

## 2019-07-23 NOTE — PLAN OF CARE
Problem: Potential for Falls  Goal: Patient will remain free of falls  Description  INTERVENTIONS:  - Assess patient frequently for physical needs  -  Identify cognitive and physical deficits and behaviors that affect risk of falls    -  [place] fall precautions as indicated by assessment   - Educate patient/family on patient safety including physical limitations  - Instruct patient to call for assistance with activity based on assessment  - Modify environment to reduce risk of injury  - Consider OT/PT consult to assist with strengthening/mobility  Outcome: Progressing     Problem: Prexisting or High Potential for Compromised Skin Integrity  Goal: Skin integrity is maintained or improved  Description  INTERVENTIONS:  - Identify patients at risk for skin breakdown  - Assess and monitor skin integrity  - Assess and monitor nutrition and hydration status  - Monitor labs (i e  albumin)  - Assess for incontinence   - Turn and reposition patient  - Assist with mobility/ambulation  - Relieve pressure over bony prominences  - Avoid friction and shearing  - Provide appropriate hygiene as needed including keeping skin clean and dry  - Evaluate need for skin moisturizer/barrier cream  - Collaborate with interdisciplinary team (i e  Nutrition, Rehabilitation, etc )   - Patient/family teaching  Outcome: Progressing     Problem: PAIN - ADULT  Goal: Verbalizes/displays adequate comfort level or baseline comfort level  Description  Interventions:  - Encourage patient to monitor pain and request assistance  - Assess pain using appropriate pain scale  - Administer analgesics based on type and severity of pain and evaluate response  - Implement non-pharmacological measures as appropriate and evaluate response  - Consider cultural and social influences on pain and pain management  - Notify physician/advanced practitioner if interventions unsuccessful or patient reports new pain  Outcome: Progressing     Problem: SAFETY ADULT  Goal: Patient will remain free of falls  Description  INTERVENTIONS:  - Assess patient frequently for physical needs  -  Identify cognitive and physical deficits and behaviors that affect risk of falls    -  [place] fall precautions as indicated by assessment   - Educate patient/family on patient safety including physical limitations  - Instruct patient to call for assistance with activity based on assessment  - Modify environment to reduce risk of injury  - Consider OT/PT consult to assist with strengthening/mobility  Outcome: Progressing  Goal: Maintain or return to baseline ADL function  Description  INTERVENTIONS:  -  Assess patient's ability to carry out ADLs; assess patient's baseline for ADL function and identify physical deficits which impact ability to perform ADLs (bathing, care of mouth/teeth, toileting, grooming, dressing, etc )  - Assess/evaluate cause of self-care deficits   - Assess range of motion  - Assess patient's mobility; develop plan if impaired  - Assess patient's need for assistive devices and provide as appropriate  - Encourage maximum independence but intervene and supervise when necessary  ¯ Involve family in performance of ADLs  ¯ Assess for home care needs following discharge   ¯ Request OT consult to assist with ADL evaluation and planning for discharge  ¯ Provide patient education as appropriate  Outcome: Progressing  Goal: Maintain or return mobility status to optimal level  Description  INTERVENTIONS:  - Assess patient's baseline mobility status (ambulation, transfers, stairs, etc )    - Identify cognitive and physical deficits and behaviors that affect mobility  - Identify mobility aids required to assist with transfers and/or ambulation (gait belt, sit-to-stand, lift, walker, cane, etc )  - [place] fall precautions as indicated by assessment  - Record patient progress and toleration of activity level on Mobility SBAR; progress patient to next Phase/Stage  - Instruct patient to call for assistance with activity based on assessment  - Request Rehabilitation consult to assist with strengthening/weightbearing, etc   Outcome: Progressing     Problem: DISCHARGE PLANNING  Goal: Discharge to home or other facility with appropriate resources  Description  INTERVENTIONS:  - Identify barriers to discharge w/patient and caregiver  - Arrange for needed discharge resources and transportation as appropriate  - Identify discharge learning needs (meds, wound care, etc )  - Arrange for interpretive services to assist at discharge as needed  - Refer to Case Management Department for coordinating discharge planning if the patient needs post-hospital services based on physician/advanced practitioner order or complex needs related to functional status, cognitive ability, or social support system  Outcome: Progressing     Problem: Knowledge Deficit  Goal: Patient/family/caregiver demonstrates understanding of disease process, treatment plan, medications, and discharge instructions  Description  Complete learning assessment and assess knowledge base  Interventions:  - Provide teaching at level of understanding  - Provide teaching via preferred learning methods  Outcome: Progressing     Problem: Nutrition/Hydration-ADULT  Goal: Nutrient/Hydration intake appropriate for improving, restoring or maintaining nutritional needs  Description  Monitor and assess patient's nutrition/hydration status for malnutrition (ex- brittle hair, bruises, dry skin, pale skin and conjunctiva, muscle wasting, smooth red tongue, and disorientation)  Collaborate with interdisciplinary team and initiate plan and interventions as ordered  Monitor patient's weight and dietary intake as ordered or per policy  Utilize nutrition screening tool and intervene per policy  Determine patient's food preferences and provide high-protein, high-caloric foods as appropriate       INTERVENTIONS:  - Monitor oral intake, urinary output, labs, and treatment plans  - Assess nutrition and hydration status and recommend course of action  - Evaluate amount of meals eaten  - Assist patient with eating if necessary   - Allow adequate time for meals  - Recommend/ encourage appropriate diets, oral nutritional supplements, and vitamin/mineral supplements  - Order, calculate, and assess calorie counts as needed  - Recommend, monitor, and adjust tube feedings and TPN/PPN based on assessed needs  - Assess need for intravenous fluids  - Provide specific nutrition/hydration education as appropriate  - Include patient/family/caregiver in decisions related to nutrition  Outcome: Progressing     Problem: NEUROSENSORY - ADULT  Goal: Achieves maximal functionality and self care  Description  INTERVENTIONS  - Monitor swallowing and airway patency with patient fatigue and changes in neurological status  - Encourage and assist patient to increase activity and self care with guidance from rehab services  - Encourage visually impaired, hearing impaired and aphasic patients to use assistive/communication devices  Outcome: Progressing     Problem: SKIN/TISSUE INTEGRITY - ADULT  Goal: Skin integrity remains intact  Description  INTERVENTIONS  - Identify patients at risk for skin breakdown  - Assess and monitor skin integrity  - Assess and monitor nutrition and hydration status  - Monitor labs (i e  albumin)  - Assess for incontinence   - Turn and reposition patient  - Assist with mobility/ambulation  - Relieve pressure over bony prominences  - Avoid friction and shearing  - Provide appropriate hygiene as needed including keeping skin clean and dry  - Evaluate need for skin moisturizer/barrier cream  - Collaborate with interdisciplinary team (i e  Nutrition, Rehabilitation, etc )   - Patient/family teaching  Outcome: Progressing

## 2019-07-23 NOTE — ASSESSMENT & PLAN NOTE
Patient initially presented from [facility] with recurrent falls of increasing frequency  She presented with several areas of bruising on her right shoulder, right knee, presence of right periorbital ecchymosis  There was no acute fracture on CT  Brain imaging showed no evidence of hemorrhagic change and presence of stable ventriculomegaly which was seen on prior imaging studies consistent with possible normal pressure hydrocephalus  Patient likely has progression of her already known history of dementia and NPH  [facility] refused to take the patient back  Patient will need alternate extended care facility for placement-case management has been consulted  Continue PT/OT consult  Continue bed alarm and fall precautions  Patient is currently awaiting rehab placement-paperwork submitted to [employer] for insurance authorization

## 2019-07-23 NOTE — PROGRESS NOTES
Progress Note - Jade Both 1945, 74 y o  female MRN: [id number]    Unit/Bed#: E4 -01 Encounter: [id number]    Primary Care Provider: Huey Parkinson MD   Date and time admitted to hospital: 7/17/2019  5:38 PM        * Ambulatory dysfunction  Assessment & Plan  Patient initially presented from [facility] with recurrent falls of increasing frequency  She presented with several areas of bruising on her right shoulder, right knee, presence of right periorbital ecchymosis  There was no acute fracture on CT  Brain imaging showed no evidence of hemorrhagic change and presence of stable ventriculomegaly which was seen on prior imaging studies consistent with possible normal pressure hydrocephalus  Patient likely has progression of her already known history of dementia and NPH  [facility] refused to take the patient back  Patient will need alternate extended care facility for placement-case management has been consulted  Continue PT/OT consult  Continue bed alarm and fall precautions  Patient is currently awaiting rehab placement-paperwork submitted to [employer] for insurance authorization  Contusion of multiple sites of right shoulder  Assessment & Plan  Right shoulder x-ray shows no acute fracture or dislocation  Right shoulder contusion show surrounding areas of ecchymosis  Continue PT/OT  Vitamin D level pending  Frontotemporal dementia without behavioral disturbance  Assessment & Plan  Patient has no behavioral disturbance  Patient is currently disoriented and is oriented only to person  Continue to maintain normal sleep/wake cycle  Benign essential hypertension  Assessment & Plan  Blood pressure currently stable and optimal   Continue lisinopril 5 mg daily          VTE Pharmacologic Prophylaxis:   Pharmacologic: Enoxaparin (Lovenox)  Mechanical VTE Prophylaxis in Place: No    Patient Centered Rounds: I have performed bedside rounds with nursing staff today  Discussions with Specialists or Other Care Team Provider:  No    Education and Discussions with Family / Patient:  Yes    Time Spent for Care: 15 minutes  More than 50% of total time spent on counseling and coordination of care as described above  Current Length of Stay: 0 day(s)    Current Patient Status: Observation   Certification Statement: The patient will continue to require additional inpatient hospital stay due to Awaiting rehab placement    Discharge Plan: To rehab when insurance authorization goes through    Code Status: Level 1 - Full Code      Subjective:   Patient has no complaints    Objective:     Vitals:   Temp (24hrs), Av °F (36 7 °C), Min:97 4 °F (36 3 °C), Max:98 6 °F (37 °C)    Temp:  [97 4 °F (36 3 °C)-98 6 °F (37 °C)] 98 1 °F (36 7 °C)  HR:  [78-97] 79  Resp:  [18-20] 20  BP: ()/(48-58) 113/58  SpO2:  [97 %-99 %] 99 %  Body mass index is 24 76 kg/m²  Input and Output Summary (last 24 hours): Intake/Output Summary (Last 24 hours) at 2019 1529  Last data filed at 2019 1515  Gross per 24 hour   Intake    Output 425 ml   Net -425 ml       Physical Exam:     Physical Exam   Constitutional: She appears well-developed and well-nourished  No distress  HENT:   Head: Normocephalic and atraumatic  Eyes: Pupils are equal, round, and reactive to light  EOM are normal    Neck: Normal range of motion  Neck supple  Cardiovascular: Normal rate, regular rhythm and normal heart sounds  No murmur heard  Pulmonary/Chest: Effort normal and breath sounds normal    Abdominal: Soft  Bowel sounds are normal    Musculoskeletal: She exhibits no edema or tenderness  Neurological: She is alert  Skin: Skin is warm and dry  She is not diaphoretic       Additional Data:     Labs:    Results from last 7 days   Lab Units 19  0505   WBC Thousand/uL 5 56   HEMOGLOBIN g/dL 10 4*   HEMATOCRIT % 33 2*   PLATELETS Thousands/uL 389   NEUTROS PCT % 58   LYMPHS PCT % 28   MONOS PCT % 9   EOS PCT % 4     Results from last 7 days   Lab Units 07/21/19  0559   SODIUM mmol/L 142   POTASSIUM mmol/L 4 3   CHLORIDE mmol/L 108   CO2 mmol/L 27   BUN mg/dL 19   CREATININE mg/dL 0 75   ANION GAP mmol/L 7   CALCIUM mg/dL 9 0   GLUCOSE RANDOM mg/dL 99         Results from last 7 days   Lab Units 07/17/19  2330   POC GLUCOSE mg/dl 153*                   * I Have Reviewed All Lab Data Listed Above  * Additional Pertinent Lab Tests Reviewed: Ciro 64 Admission Reviewed    Imaging:    Imaging Reports Reviewed Today Include:  None available  Imaging Personally Reviewed by Myself Includes:  None    Recent Cultures (last 7 days):           Last 24 Hours Medication List:     Current Facility-Administered Medications:  acetaminophen 650 mg Oral Q6H PRN Aretha Bennett PA-C   artificial tear  Both Eyes HS Vedia Hand Gyarmaty, PA-C   enoxaparin 40 mg Subcutaneous Daily Vedia Hand [place], [place]   ENSURE ACTIVE HIGH PROTEIN 8 oz Oral BID Vedia Hand Gyarmaty, PA-C   lisinopril 5 mg Oral Daily Vedia Hand Gyarmaty, PA-[facility]   rivastigmine 4 5 mg Oral Daily Aretha Bennett PA-C        Today, Patient Was Seen By: Jeferson Lane MD    ** Please Note: Dictation voice to text software may have been used in the creation of this document   **

## 2019-07-23 NOTE — PLAN OF CARE
Problem: Potential for Falls  Goal: Patient will remain free of falls  Description  INTERVENTIONS:  - Assess patient frequently for physical needs  -  Identify cognitive and physical deficits and behaviors that affect risk of falls    -  M[place]us fall precautions as indicated by assessment   - Educate patient/family on patient safety including physical limitations  - Instruct patient to call for assistance with activity based on assessment  - Modify environment to reduce risk of injury  - Consider OT/PT consult to assist with strengthening/mobility  Outcome: Progressing     Problem: Prexisting or High Potential for Compromised Skin Integrity  Goal: Skin integrity is maintained or improved  Description  INTERVENTIONS:  - Identify patients at risk for skin breakdown  - Assess and monitor skin integrity  - Assess and monitor nutrition and hydration status  - Monitor labs (i e  albumin)  - Assess for incontinence   - Turn and reposition patient  - Assist with mobility/ambulation  - Relieve pressure over bony prominences  - Avoid friction and shearing  - Provide appropriate hygiene as needed including keeping skin clean and dry  - Evaluate need for skin moisturizer/barrier cream  - Collaborate with interdisciplinary team (i e  Nutrition, Rehabilitation, etc )   - Patient/family teaching  Outcome: Progressing     Problem: PAIN - ADULT  Goal: Verbalizes/displays adequate comfort level or baseline comfort level  Description  Interventions:  - Encourage patient to monitor pain and request assistance  - Assess pain using appropriate pain scale  - Administer analgesics based on type and severity of pain and evaluate response  - Implement non-pharmacological measures as appropriate and evaluate response  - Consider cultural and social influences on pain and pain management  - Notify physician/advanced practitioner if interventions unsuccessful or patient reports new pain  Outcome: Progressing     Problem: SAFETY ADULT  Goal: Patient will remain free of falls  Description  INTERVENTIONS:  - Assess patient frequently for physical needs  -  Identify cognitive and physical deficits and behaviors that affect risk of falls    -  Egan fall precautions as indicated by assessment   - Educate patient/family on patient safety including physical limitations  - Instruct patient to call for assistance with activity based on assessment  - Modify environment to reduce risk of injury  - Consider OT/PT consult to assist with strengthening/mobility  Outcome: Progressing  Goal: Maintain or return to baseline ADL function  Description  INTERVENTIONS:  -  Assess patient's ability to carry out ADLs; assess patient's baseline for ADL function and identify physical deficits which impact ability to perform ADLs (bathing, care of mouth/teeth, toileting, grooming, dressing, etc )  - Assess/evaluate cause of self-care deficits   - Assess range of motion  - Assess patient's mobility; develop plan if impaired  - Assess patient's need for assistive devices and provide as appropriate  - Encourage maximum independence but intervene and supervise when necessary  ¯ Involve family in performance of ADLs  ¯ Assess for home care needs following discharge   ¯ Request OT consult to assist with ADL evaluation and planning for discharge  ¯ Provide patient education as appropriate  Outcome: Progressing  Goal: Maintain or return mobility status to optimal level  Description  INTERVENTIONS:  - Assess patient's baseline mobility status (ambulation, transfers, stairs, etc )    - Identify cognitive and physical deficits and behaviors that affect mobility  - Identify mobility aids required to assist with transfers and/or ambulation (gait belt, sit-to-stand, lift, walker, cane, etc )  - Egan fall precautions as indicated by assessment  - Record patient progress and toleration of activity level on Mobility SBAR; progress patient to next Phase/Stage  - Instruct patient to call for assistance with activity based on assessment  - Request Rehabilitation consult to assist with strengthening/weightbearing, etc   Outcome: Progressing     Problem: DISCHARGE PLANNING  Goal: Discharge to home or other facility with appropriate resources  Description  INTERVENTIONS:  - Identify barriers to discharge w/patient and caregiver  - Arrange for needed discharge resources and transportation as appropriate  - Identify discharge learning needs (meds, wound care, etc )  - Arrange for interpretive services to assist at discharge as needed  - Refer to Case Management Department for coordinating discharge planning if the patient needs post-hospital services based on physician/advanced practitioner order or complex needs related to functional status, cognitive ability, or social support system  Outcome: Progressing     Problem: Knowledge Deficit  Goal: Patient/family/caregiver demonstrates understanding of disease process, treatment plan, medications, and discharge instructions  Description  Complete learning assessment and assess knowledge base  Interventions:  - Provide teaching at level of understanding  - Provide teaching via preferred learning methods  Outcome: Progressing     Problem: Nutrition/Hydration-ADULT  Goal: Nutrient/Hydration intake appropriate for improving, restoring or maintaining nutritional needs  Description  Monitor and assess patient's nutrition/hydration status for malnutrition (ex- brittle hair, bruises, dry skin, pale skin and conjunctiva, muscle wasting, smooth red tongue, and disorientation)  Collaborate with interdisciplinary team and initiate plan and interventions as ordered  Monitor patient's weight and dietary intake as ordered or per policy  Utilize nutrition screening tool and intervene per policy  Determine patient's food preferences and provide high-protein, high-caloric foods as appropriate       INTERVENTIONS:  - Monitor oral intake, urinary output, labs, and treatment plans  - Assess nutrition and hydration status and recommend course of action  - Evaluate amount of meals eaten  - Assist patient with eating if necessary   - Allow adequate time for meals  - Recommend/ encourage appropriate diets, oral nutritional supplements, and vitamin/mineral supplements  - Order, calculate, and assess calorie counts as needed  - Recommend, monitor, and adjust tube feedings and TPN/PPN based on assessed needs  - Assess need for intravenous fluids  - Provide specific nutrition/hydration education as appropriate  - Include patient/family/caregiver in decisions related to nutrition  Outcome: Progressing     Problem: NEUROSENSORY - ADULT  Goal: Achieves maximal functionality and self care  Description  INTERVENTIONS  - Monitor swallowing and airway patency with patient fatigue and changes in neurological status  - Encourage and assist patient to increase activity and self care with guidance from rehab services  - Encourage visually impaired, hearing impaired and aphasic patients to use assistive/communication devices  Outcome: Progressing     Problem: SKIN/TISSUE INTEGRITY - ADULT  Goal: Skin integrity remains intact  Description  INTERVENTIONS  - Identify patients at risk for skin breakdown  - Assess and monitor skin integrity  - Assess and monitor nutrition and hydration status  - Monitor labs (i e  albumin)  - Assess for incontinence   - Turn and reposition patient  - Assist with mobility/ambulation  - Relieve pressure over bony prominences  - Avoid friction and shearing  - Provide appropriate hygiene as needed including keeping skin clean and dry  - Evaluate need for skin moisturizer/barrier cream  - Collaborate with interdisciplinary team (i e  Nutrition, Rehabilitation, etc )   - Patient/family teaching  Outcome: Progressing

## 2019-07-23 NOTE — UTILIZATION REVIEW
Continued Stay Review    Date:  7/23/2019                         Current Patient Class: OBS  Current Level of Care: [facility]    HPI:73 y o  female initially admitted on 7/17  @  2025     Assessment/Plan: Patient is currently awaiting rehab placement-    Pertinent Labs/Diagnostic Results:   Results from last 7 days   Lab Units 07/18/19  0505 07/17/19  1840   WBC Thousand/uL 5 56 9 72   HEMOGLOBIN g/dL 10 4* 11 7   HEMATOCRIT % 33 2* 36 4   PLATELETS Thousands/uL 389 441*   NEUTROS ABS Thousands/µL 3 21 7 03     Results from last 7 days   Lab Units 07/21/19  0559 07/18/19  0505 07/17/19  1840   SODIUM mmol/L 142 142 141   POTASSIUM mmol/L 4 3 3 8 3 5   CHLORIDE mmol/L 108 109* 105   CO2 mmol/L 27 27 29   ANION GAP mmol/L 7 6 7   BUN mg/dL 19 21 27*   CREATININE mg/dL 0 75 0 59* 0 68   EGFR ml/min/1 73sq m 78 90 86   CALCIUM mg/dL 9 0 8 9 9 5     Results from last 7 days   Lab Units 07/17/19  2330   POC GLUCOSE mg/dl 153*     Results from last 7 days   Lab Units 07/21/19  0559 07/18/19  0505 07/17/19  1840   GLUCOSE RANDOM mg/dL 99 94 105     Results from last 7 days   Lab Units 07/20/19  1146   FERRITIN ng/mL 50     Results from last 7 days   Lab Units 07/18/19  2141   CLARITY UA  Clear   COLOR UA  Yellow   SPEC GRAV UA  1 020   PH UA  7 0   GLUCOSE UA mg/dl Negative   KETONES UA mg/dl Negative   BLOOD UA  Negative   PROTEIN UA mg/dl Negative   NITRITE UA  Negative   BILIRUBIN UA  Negative   UROBILINOGEN UA E U /dl 0 2   LEUKOCYTES UA  Negative     Vital Signs:   07/23/19 0746  98 6 °F (37 °C)  97  18  99/53  98 %  None (Room air)  Sitting   07/23/19 0300  97 9 °F (36 6 °C)  80    100/56  97 %  None (Room air)  Lying         Medications:   Scheduled Meds:   Current Facility-Administered Medications:  acetaminophen 650 mg Oral Q6H PRN    artificial tear  Both Eyes HS    enoxaparin 40 mg Subcutaneous Daily    ENSURE ACTIVE HIGH PROTEIN 8 oz Oral BID    lisinopril 5 mg Oral Daily    rivastigmine 4 5 mg Oral Daily Discharge Plan: Accepted @ [facility] Department  Phone: [phone]; Fax [phone]  [email or web address]  ATTENTION: Please call with any questions or concerns to [phone]  and carefully listen to the prompts so that you are directed to the right person  Send all requests for admission clinical reviews, approved or denied determinations and any other requests to fax [phone]   All voicemails are confidential

## 2019-07-23 NOTE — SOCIAL WORK
CM talked with pt's daughter/POA earlier giving her the accepting facilities for pt for STR with anticipation to then return to [facility] (per Pennie Wade will go to the Memory Care Unit thereafter asking for a call at [phone] on d/c)  Daughter agreeable to have pt go to [facility] as it's closest to family  Son obtaining authorization which is still pending  Transportation will need to be arranged with daughter updated on date/time   Will continue to follow to assist with d/c plan of care

## 2019-07-24 VITALS
DIASTOLIC BLOOD PRESSURE: 53 MMHG | BODY MASS INDEX: 24.71 KG/M2 | TEMPERATURE: 99.2 F | WEIGHT: 122.58 LBS | HEIGHT: 59 IN | OXYGEN SATURATION: 97 % | SYSTOLIC BLOOD PRESSURE: 105 MMHG | HEART RATE: 95 BPM | RESPIRATION RATE: 20 BRPM

## 2019-07-24 PROCEDURE — 99217 PR OBSERVATION CARE DISCHARGE MANAGEMENT: CPT | Performed by: FAMILY MEDICINE

## 2019-07-24 PROCEDURE — 97535 SELF CARE MNGMENT TRAINING: CPT

## 2019-07-24 RX ORDER — LACTOSE-REDUCED FOOD
8 LIQUID (ML) ORAL 2 TIMES DAILY
Qty: 60 CAN | Refills: 3
Start: 2019-07-24 | End: 2020-09-21 | Stop reason: ALTCHOICE

## 2019-07-24 RX ADMIN — Medication 8 OZ: at 09:02

## 2019-07-24 RX ADMIN — ENOXAPARIN SODIUM 40 MG: 40 INJECTION SUBCUTANEOUS at 09:01

## 2019-07-24 RX ADMIN — LISINOPRIL 5 MG: 5 TABLET ORAL at 09:02

## 2019-07-24 RX ADMIN — RIVASTIGMINE TARTRATE 4.5 MG: 3 CAPSULE ORAL at 09:02

## 2019-07-24 NOTE — ASSESSMENT & PLAN NOTE
Patient initially presented from [facility] with recurrent falls of increasing frequency  She presented with several areas of bruising on her right shoulder, right knee, presence of right periorbital ecchymosis  There was no acute fracture on CT  Brain imaging showed no evidence of hemorrhagic change and presence of stable ventriculomegaly which was seen on prior imaging studies consistent with possible normal pressure hydrocephalus  Patient likely has progression of her already known history of dementia and NPH  [facility] facility refused to take the patient back  Patient will need alternate extended care facility for placement-case management has been consulted  Continue PT/OT consult  Continue bed alarm and fall precautions  Patient has obtained authorization to go to [facility] acute rehab today  Patient is currently hemodynamically stable for discharge to acute rehab today

## 2019-07-24 NOTE — DISCHARGE SUMMARY
Discharge- Becca Sanchez 1945, 74 y o  female MRN: [id number]    Unit/Bed#: E4 -01 Encounter: [id number]    Primary Care Provider: Rose Marie Avalos MD   Date and time admitted to hospital: 7/17/2019  5:38 PM        * Ambulatory dysfunction  Assessment & Plan  Patient initially presented from [facility] with recurrent falls of increasing frequency  She presented with several areas of bruising on her right shoulder, right knee, presence of right periorbital ecchymosis  There was no acute fracture on CT  Brain imaging showed no evidence of hemorrhagic change and presence of stable ventriculomegaly which was seen on prior imaging studies consistent with possible normal pressure hydrocephalus  Patient likely has progression of her already known history of dementia and NPH  [facility] refused to take the patient back  Patient will need alternate extended care facility for placement-case management has been consulted  Continue PT/OT consult  Continue bed alarm and fall precautions  Patient has obtained authorization to go to [facility] acute rehab today  Patient is currently hemodynamically stable for discharge to acute rehab today  Contusion of multiple sites of right shoulder  Assessment & Plan  Right shoulder x-ray shows no acute fracture or dislocation  Right shoulder contusion show surrounding areas of ecchymosis  Continue PT/OT  Vitamin D level pending  Frontotemporal dementia without behavioral disturbance  Assessment & Plan  Patient has no behavioral disturbance  Patient is currently disoriented and is oriented only to person  Continue to maintain normal sleep/wake cycle  Benign essential hypertension  Assessment & Plan  Blood pressure currently stable and optimal   Continue lisinopril 5 mg daily          Discharging Physician / Practitioner: Jeferson Lane MD  PCP: Rose Marie Avalos MD  Admission Date:   Admission Orders (From admission, onward)    Ordered        07/17/19 2025  Place in Observation  Once             Discharge Date: 07/24/19    Resolved Problems  Date Reviewed: 7/24/2019    None          Consultations During Hospital Stay:  · Physical therapy/occupational therapy, case management  Procedures Performed:   · CT head without contrast 07/17/2019, right shoulder x-ray 07/18/2019  Significant Findings / Test Results:   · CT head without contrast 7/17-stable ventriculomegaly out of proportion to the degree of sulcal dilatation  Normal pressure hydrocephalus is again not excluded by imaging  There are again low lying cerebellar tonsils which enter the foramen magnum crowding the cervical cord and partially effacing the CSF spaces  No intracranial hemorrhage or territorial infarct is found  · Right shoulder x-rays 7/18-no acute osseous abnormality  Incidental Findings:   · None     Test Results Pending at Discharge (will require follow up): · None     Outpatient Tests Requested:  · None    Complications:  None    Reason for Admission:  Ambulatory dysfunction with recurrent falls requiring rehab placement  Hospital Course:     Rafiq Odell is a 74 y o  female patient who originally presented to the hospital on 7/17/2019 due to recurrent falls of at least 10 within the last 2 days prior to her presentation  The patient had been in discussions with a physical therapist regarding arranging for walker for the patient and assessing her room for fall risks  The patient's daughter believes that patient has dementia which is progressive and worsening was likely contributing to her frequent falls and impulsiveness  Patient was admitted from a nursing facility  During hospitalization, she was seen by physical therapy/occupational therapy and they evaluated her    CT head without contrast was negative acute intracranial hemorrhage or territorial infarct except for low lying cerebellar tonsils which into the foramen magnum crowding the cervical cord and partially effacing the CSF spaces  Normal pressure hydrocephalus could again not be excluded by imaging  Patient's hospitalization was uneventful  Case management worked on patient's case  Eventually insurance authorization was secured and she was approved for admission to [facility] rehab  The patient is currently hemodynamically stable for discharge to acute rehab today  Please see above list of diagnoses and related plan for additional information  Condition at Discharge: stable     Discharge Day Visit / Exam:     * Please refer to separate progress note for these details *    Discussion with Family:  No    Discharge instructions/Information to patient and family:   See after visit summary for information provided to patient and family  Provisions for Follow-Up Care:  See after visit summary for information related to follow-up care and any pertinent home health orders  Disposition:     Acute Rehab at [facility]  to [facility] SNF:   · Not Applicable to this Patient - Not Applicable to this Patient    Planned Readmission:  Yes to [facility] rehab  Discharge Statement:  I spent 25 minutes discharging the patient  This time was spent on the day of discharge  I had direct contact with the patient on the day of discharge  Greater than 50% of the total time was spent examining patient, answering all patient questions, arranging and discussing plan of care with patient as well as directly providing post-discharge instructions  Additional time then spent on discharge activities  Discharge Medications:  See after visit summary for reconciled discharge medications provided to patient and family        ** Please Note: This note has been constructed using a voice recognition system **

## 2019-07-24 NOTE — SOCIAL WORK
Patient being d/c to [facility] today  Carol received [address] from insurance  Cm arranged BLS with [facility] for 2pm but later changed to 530pm because no verification of authorization  Imm explained to patients daughter Niko Dowd and she was told d/c time  Niko Dowd stated she would request family take clothing to [facility] tomorrow

## 2019-07-24 NOTE — PLAN OF CARE
Problem: OCCUPATIONAL THERAPY ADULT  Goal: Performs self-care activities at highest level of function for planned discharge setting  See evaluation for individualized goals  Description  Treatment Interventions: ADL retraining, Functional transfer training, UE strengthening/ROM, Endurance training, Cognitive reorientation, Patient/family training, Equipment evaluation/education, Compensatory technique education, Activityengagement, Energy conservation          See flowsheet documentation for full assessment, interventions and recommendations  Outcome: Progressing  Note:   Limitation: Decreased ADL status, Decreased UE strength, Decreased Safe judgement during ADL, Decreased cognition, Decreased endurance, Decreased high-level ADLs, Decreased self-care trans  Prognosis: Good  Assessment: Pt was seen for skilled OT with focus on completion of self care tasks, basic orienation, review of fall prevention, object identification and review of current plan of care  Pt displayed aphasic presentation with initial communication  Improved carry over of self care routine with presentation of items to encourage choice to complete light grooming activity  Pt was able to verbalize current location with appropriate verablization  Pt will benefit from consistency with therapies with use of gestures and cues to sequence tasks  Pt will benefit from consistency with daily routine with set up to develop routine to encourage optimal independence with functional tasks        OT Discharge Recommendation: Short Term Rehab  OT - OK to Discharge: Yes(when medicall cleared  )

## 2019-07-24 NOTE — PLAN OF CARE
Problem: Potential for Falls  Goal: Patient will remain free of falls  Description  INTERVENTIONS:  - Assess patient frequently for physical needs  -  Identify cognitive and physical deficits and behaviors that affect risk of falls    -  [place] fall precautions as indicated by assessment   - Educate patient/family on patient safety including physical limitations  - Instruct patient to call for assistance with activity based on assessment  - Modify environment to reduce risk of injury  - Consider OT/PT consult to assist with strengthening/mobility  Outcome: Progressing     Problem: Prexisting or High Potential for Compromised Skin Integrity  Goal: Skin integrity is maintained or improved  Description  INTERVENTIONS:  - Identify patients at risk for skin breakdown  - Assess and monitor skin integrity  - Assess and monitor nutrition and hydration status  - Monitor labs (i e  albumin)  - Assess for incontinence   - Turn and reposition patient  - Assist with mobility/ambulation  - Relieve pressure over bony prominences  - Avoid friction and shearing  - Provide appropriate hygiene as needed including keeping skin clean and dry  - Evaluate need for skin moisturizer/barrier cream  - Collaborate with interdisciplinary team (i e  Nutrition, Rehabilitation, etc )   - Patient/family teaching  Outcome: Progressing     Problem: PAIN - ADULT  Goal: Verbalizes/displays adequate comfort level or baseline comfort level  Description  Interventions:  - Encourage patient to monitor pain and request assistance  - Assess pain using appropriate pain scale  - Administer analgesics based on type and severity of pain and evaluate response  - Implement non-pharmacological measures as appropriate and evaluate response  - Consider cultural and social influences on pain and pain management  - Notify physician/advanced practitioner if interventions unsuccessful or patient reports new pain  Outcome: Progressing     Problem: SAFETY ADULT  Goal: Patient will remain free of falls  Description  INTERVENTIONS:  - Assess patient frequently for physical needs  -  Identify cognitive and physical deficits and behaviors that affect risk of falls    -  [place] fall precautions as indicated by assessment   - Educate patient/family on patient safety including physical limitations  - Instruct patient to call for assistance with activity based on assessment  - Modify environment to reduce risk of injury  - Consider OT/PT consult to assist with strengthening/mobility  Outcome: Progressing  Goal: Maintain or return to baseline ADL function  Description  INTERVENTIONS:  -  Assess patient's ability to carry out ADLs; assess patient's baseline for ADL function and identify physical deficits which impact ability to perform ADLs (bathing, care of mouth/teeth, toileting, grooming, dressing, etc )  - Assess/evaluate cause of self-care deficits   - Assess range of motion  - Assess patient's mobility; develop plan if impaired  - Assess patient's need for assistive devices and provide as appropriate  - Encourage maximum independence but intervene and supervise when necessary  ¯ Involve family in performance of ADLs  ¯ Assess for home care needs following discharge   ¯ Request OT consult to assist with ADL evaluation and planning for discharge  ¯ Provide patient education as appropriate  Outcome: Progressing  Goal: Maintain or return mobility status to optimal level  Description  INTERVENTIONS:  - Assess patient's baseline mobility status (ambulation, transfers, stairs, etc )    - Identify cognitive and physical deficits and behaviors that affect mobility  - Identify mobility aids required to assist with transfers and/or ambulation (gait belt, sit-to-stand, lift, walker, cane, etc )  - [place] fall precautions as indicated by assessment  - Record patient progress and toleration of activity level on Mobility SBAR; progress patient to next Phase/Stage  - Instruct patient to call for assistance with activity based on assessment  - Request Rehabilitation consult to assist with strengthening/weightbearing, etc   Outcome: Progressing     Problem: DISCHARGE PLANNING  Goal: Discharge to home or other facility with appropriate resources  Description  INTERVENTIONS:  - Identify barriers to discharge w/patient and caregiver  - Arrange for needed discharge resources and transportation as appropriate  - Identify discharge learning needs (meds, wound care, etc )  - Arrange for interpretive services to assist at discharge as needed  - Refer to Case Management Department for coordinating discharge planning if the patient needs post-hospital services based on physician/advanced practitioner order or complex needs related to functional status, cognitive ability, or social support system  Outcome: Progressing     Problem: Knowledge Deficit  Goal: Patient/family/caregiver demonstrates understanding of disease process, treatment plan, medications, and discharge instructions  Description  Complete learning assessment and assess knowledge base  Interventions:  - Provide teaching at level of understanding  - Provide teaching via preferred learning methods  Outcome: Progressing     Problem: Nutrition/Hydration-ADULT  Goal: Nutrient/Hydration intake appropriate for improving, restoring or maintaining nutritional needs  Description  Monitor and assess patient's nutrition/hydration status for malnutrition (ex- brittle hair, bruises, dry skin, pale skin and conjunctiva, muscle wasting, smooth red tongue, and disorientation)  Collaborate with interdisciplinary team and initiate plan and interventions as ordered  Monitor patient's weight and dietary intake as ordered or per policy  Utilize nutrition screening tool and intervene per policy  Determine patient's food preferences and provide high-protein, high-caloric foods as appropriate       INTERVENTIONS:  - Monitor oral intake, urinary output, labs, and treatment plans  - Assess nutrition and hydration status and recommend course of action  - Evaluate amount of meals eaten  - Assist patient with eating if necessary   - Allow adequate time for meals  - Recommend/ encourage appropriate diets, oral nutritional supplements, and vitamin/mineral supplements  - Order, calculate, and assess calorie counts as needed  - Recommend, monitor, and adjust tube feedings and TPN/PPN based on assessed needs  - Assess need for intravenous fluids  - Provide specific nutrition/hydration education as appropriate  - Include patient/family/caregiver in decisions related to nutrition  Outcome: Progressing     Problem: NEUROSENSORY - ADULT  Goal: Achieves maximal functionality and self care  Description  INTERVENTIONS  - Monitor swallowing and airway patency with patient fatigue and changes in neurological status  - Encourage and assist patient to increase activity and self care with guidance from rehab services  - Encourage visually impaired, hearing impaired and aphasic patients to use assistive/communication devices  Outcome: Progressing     Problem: SKIN/TISSUE INTEGRITY - ADULT  Goal: Skin integrity remains intact  Description  INTERVENTIONS  - Identify patients at risk for skin breakdown  - Assess and monitor skin integrity  - Assess and monitor nutrition and hydration status  - Monitor labs (i e  albumin)  - Assess for incontinence   - Turn and reposition patient  - Assist with mobility/ambulation  - Relieve pressure over bony prominences  - Avoid friction and shearing  - Provide appropriate hygiene as needed including keeping skin clean and dry  - Evaluate need for skin moisturizer/barrier cream  - Collaborate with interdisciplinary team (i e  Nutrition, Rehabilitation, etc )   - Patient/family teaching  Outcome: Progressing

## 2019-07-24 NOTE — OCCUPATIONAL THERAPY NOTE
Occupational Therapy Treatment Note:         07/24/19 1120   Restrictions/Precautions   Weight Bearing Precautions Per Order No   Other Precautions Pain; Fall Risk;Cognitive; Chair Alarm; Bed Alarm   Pain Assessment   Pain Assessment No/denies pain   Pain Score No Pain   Diversional Activities Television   ADL   Where Assessed Chair   Grooming Assistance 4  Minimal Assistance   Grooming Deficit Setup;Supervision/safety;Verbal cueing; Increased time to complete   Grooming Comments Pt able to appropriately use objects after presentation and cues to initiate tasks  UB Bathing Assistance 4  Minimal Assistance   UB Bathing Deficit Setup   LB Bathing Assistance 4  Minimal Assistance   LB Bathing Deficit Steadying;Setup;Verbal cueing;Supervision/safety; Increased time to complete; Buttocks; Perineal area;Right lower leg including foot; Left lower leg including foot   LB Bathing Comments cues for safety required with activities  Pt able to carry over techs with F quality noted and no LOB  UB Dressing Assistance 4  Minimal Assistance   UB Dressing Deficit Setup   LB Dressing Assistance 4  Minimal Assistance   LB Dressing Deficit Steadying;Setup; Requires assistive device for steadying;Verbal cueing;Supervision/safety; Increased time to complete; Don/doff R sock; Don/doff L sock;Pull up over hips; Thread LLE into underwear; Thread RLE into underwear   LB Dressing Comments cues for safety required with functional reach  Toileting Assistance  4  Minimal Assistance   Toileting Deficit Setup;Steadying;Verbal cueing;Supervison/safety; Increased time to complete;Clothing management down;Clothing management up;Perineal hygiene; Bedside commode   Toileting Comments cues for safety required with activity  Transfers   Sit to Stand 4  Minimal assistance   Additional items Assist x 1; Armrests; Increased time required;Verbal cues   Stand to Sit 4  Minimal assistance   Additional items Assist x 1; Armrests; Increased time required;Verbal cues Stand pivot 4  Minimal assistance   Additional items Assist x 1; Increased time required;Verbal cues;Armrests   Toilet transfer 4  Minimal assistance   Additional items Assist x 1; Armrests; Increased time required;Verbal cues;Standard toilet   Additional Comments cues for safety required with activities  Functional Mobility   Functional Mobility 4  Minimal assistance   Additional Comments x1   Additional items Rolling walker   Cognition   Overall Cognitive Status Impaired   Arousal/Participation Cooperative; Alert   Attention Difficulty attending to directions   Orientation Level Oriented to person;Oriented to place   Memory Decreased recall of recent events;Decreased short term memory;Decreased recall of precautions   Following Commands Follows one step commands without difficulty   Comments Pt with need for gestures and items identification to engage in activities  Additional Activities   Additional Activities Other (Comment)  (reviewed current plan of care  )   Additional Activities Comments Pt with limited carry over of reviewed information  Step by step verbal cues and A required to engage in activities  Activity Tolerance   Activity Tolerance Patient tolerated treatment well   Medical Staff Made Aware reported all findings to nursing staff  Assessment   Assessment Pt was seen for skilled OT with focus on completion of self care tasks, basic orienation, review of fall prevention, object identification and review of current plan of care  Pt displayed aphasic presentation with initial communication  Improved carry over of self care routine with presentation of items to encourage choice to complete light grooming activity  Pt was able to verbalize current location with appropriate verablization  Pt will benefit from consistency with therapies with use of gestures and cues to sequence tasks   Pt will benefit from consistency with daily routine with set up to develop routine to encourage optimal independence with functional tasks  Plan   Treatment Interventions ADL retraining;Functional transfer training; Endurance training;Cognitive reorientation   Goal Expiration Date 08/02/19   Treatment Day 2   OT Frequency 3-5x/wk   Recommendation   OT Discharge Recommendation Short Term Rehab   OT - OK to Discharge Yes  (when medicall cleared  )   Barthel Index   Feeding 5   Bathing 0   Grooming Score 0   Dressing Score 5   Bladder Score 0   Bowels Score 5   Toilet Use Score 5   Transfers (Bed/Chair) Score 10   Mobility (Level Surface) Score 0   Stairs Score 0   Barthel Index Score 30   Modified Jason Scale   Modified Jason Scale 4   Koleel Deck, [address]

## 2019-07-24 NOTE — TRANSPORTATION MEDICAL NECESSITY
Section I - General Information    Name of Patient: Aura Drew                 : 1945    Medicare #: [id number]  Transport Date: 19 (PCS is valid for round trips on this date and for all repetitive trips in the 60-day range as noted below )  Origin: [facility]                                                         Destination: [facility]  Is the pt's stay covered under Medicare Part A (PPS/DRG)   []     Closest appropriate facility? If no, why is transport to more distant facility required? Yes  If hospice pt, is this transport related to pt's terminal illness? NA       Section II - Medical Necessity Questionnaire  Ambulance transportation is medically necessary only if other means of transport are contraindicated or would be potentially harmful to the patient  To meet this requirement, the patient must either be "bed confined" or suffer from a condition such that transport by means other than ambulance is contraindicated by the patient's condition  The following questions must be answered by the medical professional signing below for this form to be valid:    1)  Describe the MEDICAL CONDITION (physical and/or mental) of this patient AT [address] [place] [address] that requires the patient to be transported in an ambulance and why transport by other means is contraindicated by the patient's condition:dementia, falls risk needs an attendant    2) Is the patient "bed confined" as defined below? No  To be "be confined" the patient must satisfy all three of the following conditions: (1) unable to get up from bed without Assistance; AND (2) unable to ambulate; AND (3) unable to sit in a chair or wheelchair  3) Can this patient safely be transported by car or wheelchair van (i e , seated during transport without a medical attendant or monitoring)?    No    4) In addition to completing questions 1-3 above, please check any of the following conditions that apply*: *Note: supporting documentation for any boxes checked must be maintained in the patient's medical records  If hosp-hosp transfer, describe services needed at 2nd facility not available at 1st facility? Patient is confused  Danger to self/others  Medical attendant required       Section III - Signature of Physician or Healthcare Professional  I certify that the above information is true and correct based on my evaluation of this patient, and represent that the patient requires transport by ambulance and that other forms of transport are contraindicated  I understand that this information will be used by the Centers for Medicare and Medicaid Services (CMS) to support the determination of medical necessity for ambulance services, and I represent that I have personal knowledge of the patient's condition at time of transport  []  If this box is checked, I also certify that the patient is physically or mentally incapable of signing the ambulance service's claim and that the institution with which I am affiliated has furnished care, services, or assistance to the patient  My signature below is made on behalf of the patient pursuant to 42 CFR §424 36(b)(4)  In accordance with 42 CFR §424 37, the specific reason(s) that the patient is physically or mentally incapable of signing the claim form is as follows: Kayode Ann    Signature of Physician* or Healthcare Professional______________________________________________________________  Signature Date 07/24/19 (For scheduled repetitive transports, this form is not valid for transports performed more than 60 days after this date)    Printed Name & Credentials of Physician or Healthcare Professional (MD, DO, RN, etc )________________________________  *Form must be signed by patient's attending physician for scheduled, repetitive transports   For non-repetitive, unscheduled ambulance transports, if unable to obtain the signature of the attending physician, any of the following may sign (choose appropriate option below)  [] Physician Assistant []  Clinical Nurse Specialist [x]  Registered Nurse  []  Nurse Practitioner  [x] Discharge Planner

## 2019-07-24 NOTE — PLAN OF CARE
Problem: Potential for Falls  Goal: Patient will remain free of falls  Description  INTERVENTIONS:  - Assess patient frequently for physical needs  -  Identify cognitive and physical deficits and behaviors that affect risk of falls    -  [place] fall precautions as indicated by assessment   - Educate patient/family on patient safety including physical limitations  - Instruct patient to call for assistance with activity based on assessment  - Modify environment to reduce risk of injury  - Consider OT/PT consult to assist with strengthening/mobility  Outcome: Progressing     Problem: Prexisting or High Potential for Compromised Skin Integrity  Goal: Skin integrity is maintained or improved  Description  INTERVENTIONS:  - Identify patients at risk for skin breakdown  - Assess and monitor skin integrity  - Assess and monitor nutrition and hydration status  - Monitor labs (i e  albumin)  - Assess for incontinence   - Turn and reposition patient  - Assist with mobility/ambulation  - Relieve pressure over bony prominences  - Avoid friction and shearing  - Provide appropriate hygiene as needed including keeping skin clean and dry  - Evaluate need for skin moisturizer/barrier cream  - Collaborate with interdisciplinary team (i e  Nutrition, Rehabilitation, etc )   - Patient/family teaching  Outcome: Progressing     Problem: PAIN - ADULT  Goal: Verbalizes/displays adequate comfort level or baseline comfort level  Description  Interventions:  - Encourage patient to monitor pain and request assistance  - Assess pain using appropriate pain scale  - Administer analgesics based on type and severity of pain and evaluate response  - Implement non-pharmacological measures as appropriate and evaluate response  - Consider cultural and social influences on pain and pain management  - Notify physician/advanced practitioner if interventions unsuccessful or patient reports new pain  Outcome: Progressing     Problem: SAFETY ADULT  Goal: Patient will remain free of falls  Description  INTERVENTIONS:  - Assess patient frequently for physical needs  -  Identify cognitive and physical deficits and behaviors that affect risk of falls    -  [place] fall precautions as indicated by assessment   - Educate patient/family on patient safety including physical limitations  - Instruct patient to call for assistance with activity based on assessment  - Modify environment to reduce risk of injury  - Consider OT/PT consult to assist with strengthening/mobility  Outcome: Progressing  Goal: Maintain or return to baseline ADL function  Description  INTERVENTIONS:  -  Assess patient's ability to carry out ADLs; assess patient's baseline for ADL function and identify physical deficits which impact ability to perform ADLs (bathing, care of mouth/teeth, toileting, grooming, dressing, etc )  - Assess/evaluate cause of self-care deficits   - Assess range of motion  - Assess patient's mobility; develop plan if impaired  - Assess patient's need for assistive devices and provide as appropriate  - Encourage maximum independence but intervene and supervise when necessary  ¯ Involve family in performance of ADLs  ¯ Assess for home care needs following discharge   ¯ Request OT consult to assist with ADL evaluation and planning for discharge  ¯ Provide patient education as appropriate  Outcome: Progressing  Goal: Maintain or return mobility status to optimal level  Description  INTERVENTIONS:  - Assess patient's baseline mobility status (ambulation, transfers, stairs, etc )    - Identify cognitive and physical deficits and behaviors that affect mobility  - Identify mobility aids required to assist with transfers and/or ambulation (gait belt, sit-to-stand, lift, walker, cane, etc )  - [place] fall precautions as indicated by assessment  - Record patient progress and toleration of activity level on Mobility SBAR; progress patient to next Phase/Stage  - Instruct patient to call for assistance with activity based on assessment  - Request Rehabilitation consult to assist with strengthening/weightbearing, etc   Outcome: Progressing     Problem: DISCHARGE PLANNING  Goal: Discharge to home or other facility with appropriate resources  Description  INTERVENTIONS:  - Identify barriers to discharge w/patient and caregiver  - Arrange for needed discharge resources and transportation as appropriate  - Identify discharge learning needs (meds, wound care, etc )  - Arrange for interpretive services to assist at discharge as needed  - Refer to Case Management Department for coordinating discharge planning if the patient needs post-hospital services based on physician/advanced practitioner order or complex needs related to functional status, cognitive ability, or social support system  Outcome: Progressing     Problem: Knowledge Deficit  Goal: Patient/family/caregiver demonstrates understanding of disease process, treatment plan, medications, and discharge instructions  Description  Complete learning assessment and assess knowledge base  Interventions:  - Provide teaching at level of understanding  - Provide teaching via preferred learning methods  Outcome: Progressing     Problem: Nutrition/Hydration-ADULT  Goal: Nutrient/Hydration intake appropriate for improving, restoring or maintaining nutritional needs  Description  Monitor and assess patient's nutrition/hydration status for malnutrition (ex- brittle hair, bruises, dry skin, pale skin and conjunctiva, muscle wasting, smooth red tongue, and disorientation)  Collaborate with interdisciplinary team and initiate plan and interventions as ordered  Monitor patient's weight and dietary intake as ordered or per policy  Utilize nutrition screening tool and intervene per policy  Determine patient's food preferences and provide high-protein, high-caloric foods as appropriate       INTERVENTIONS:  - Monitor oral intake, urinary output, labs, and treatment plans  - Assess nutrition and hydration status and recommend course of action  - Evaluate amount of meals eaten  - Assist patient with eating if necessary   - Allow adequate time for meals  - Recommend/ encourage appropriate diets, oral nutritional supplements, and vitamin/mineral supplements  - Order, calculate, and assess calorie counts as needed  - Recommend, monitor, and adjust tube feedings and TPN/PPN based on assessed needs  - Assess need for intravenous fluids  - Provide specific nutrition/hydration education as appropriate  - Include patient/family/caregiver in decisions related to nutrition  Outcome: Progressing     Problem: NEUROSENSORY - ADULT  Goal: Achieves maximal functionality and self care  Description  INTERVENTIONS  - Monitor swallowing and airway patency with patient fatigue and changes in neurological status  - Encourage and assist patient to increase activity and self care with guidance from rehab services  - Encourage visually impaired, hearing impaired and aphasic patients to use assistive/communication devices  Outcome: Progressing     Problem: SKIN/TISSUE INTEGRITY - ADULT  Goal: Skin integrity remains intact  Description  INTERVENTIONS  - Identify patients at risk for skin breakdown  - Assess and monitor skin integrity  - Assess and monitor nutrition and hydration status  - Monitor labs (i e  albumin)  - Assess for incontinence   - Turn and reposition patient  - Assist with mobility/ambulation  - Relieve pressure over bony prominences  - Avoid friction and shearing  - Provide appropriate hygiene as needed including keeping skin clean and dry  - Evaluate need for skin moisturizer/barrier cream  - Collaborate with interdisciplinary team (i e  Nutrition, Rehabilitation, etc )   - Patient/family teaching  Outcome: Progressing

## 2019-10-07 ENCOUNTER — APPOINTMENT (EMERGENCY)
Dept: RADIOLOGY | Facility: HOSPITAL | Age: 74
End: 2019-10-07
Payer: COMMERCIAL

## 2019-10-07 ENCOUNTER — HOSPITAL ENCOUNTER (EMERGENCY)
Facility: HOSPITAL | Age: 74
Discharge: NURSING, REHAB OR LONG-TERM CARE | End: 2019-10-07
Attending: EMERGENCY MEDICINE | Admitting: EMERGENCY MEDICINE
Payer: COMMERCIAL

## 2019-10-07 VITALS
SYSTOLIC BLOOD PRESSURE: 158 MMHG | DIASTOLIC BLOOD PRESSURE: 77 MMHG | HEART RATE: 80 BPM | TEMPERATURE: 98.4 F | OXYGEN SATURATION: 100 % | WEIGHT: 122.58 LBS | BODY MASS INDEX: 24.71 KG/M2 | RESPIRATION RATE: 18 BRPM | HEIGHT: 59 IN

## 2019-10-07 DIAGNOSIS — S89.92XA LEFT KNEE INJURY: ICD-10-CM

## 2019-10-07 DIAGNOSIS — M25.552 LEFT HIP PAIN: ICD-10-CM
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
## 2019-10-07 PROCEDURE — 73502 X-RAY EXAM HIP UNI 2-3 VIEWS: CPT

## 2019-10-07 PROCEDURE — 99284 EMERGENCY DEPT VISIT MOD MDM: CPT

## 2019-10-07 PROCEDURE — 99283 EMERGENCY DEPT VISIT LOW MDM: CPT | Performed by: EMERGENCY MEDICINE

## 2019-10-07 PROCEDURE — 73564 X-RAY EXAM KNEE 4 OR MORE: CPT

## 2019-10-07 PROCEDURE — 70450 CT HEAD/BRAIN W/O DYE: CPT

## 2019-10-07 RX ORDER — ACETAMINOPHEN 325 MG/1
650 TABLET ORAL ONCE
Status: DISCONTINUED | OUTPATIENT
Start: 2019-10-07 | End: 2019-10-07 | Stop reason: HOSPADM

## 2019-10-07 NOTE — ED NOTES
Patient getting picked up at 1600  for transport back to [facility]       Rajiv Olmedo, TIMOTHY  10/07/19 1251

## 2019-10-07 NOTE — SOCIAL WORK
Jatinder consulted that pt need transportation to return to [facility]  Jatinder contacted pt POA daughter Val Epstein [phone] to confirm transportation  Per Val Epstein, she's okay with pt transport via [address] and in agreeable to out of pocket cost    Jatinder arranged transportation with [facility] for 4 pm    [facility] made aware of time of transportation  RN made aware of transportation time

## 2019-10-07 NOTE — ED PROVIDER NOTES
Emergency Department Note- Roselia Robert 66 y o  female MRN: [id number]    Unit/Bed#: ED 06 Encounter: [id number]        History of Present Illness   HPI:  Roselia Robert is a 66 y o  female who presents with fall at a locked dementia unit    The patient is unable prior history other than stating that her left knee and left hip hurt she does not have a headache she denies neck pain she denies chest pain or shortness of breath denies abdominal pain past medical history of hypertension no relieving or precipitating factors no fever no chills  Review of Systems  REVIEW OF SYSTEMS unobtainable as the patient is demented  Constitutional:  denies fever, chills, no weight loss   Eyes:  Denies visual changes, denies eye pain    HENT:  Denies nasal congestion or sore throat   Respiratory:  Denies cough or shortness of breath, denies hemoptysis    Cardiovascular:  Denies chest pain, palpitations, or leg edema    GI:  Denies abdominal pain, nausea, vomiting, bloody stools, melena, or diarrhea   :  Denies dysuria, hematuria, polyuria   Musculoskeletal:  Denies back pain or joint pain   Integument:  Denies rash, denies color change    Neurologic:  Denies headache, focal weakness or sensory changes   Endocrine:  Denies polyuria or polydipsia   Lymphatic:  Denies swollen glands   Psychiatric:  Denies depression or anxiety     All system reviewed and negative except as noted above or in HPI    Historical Information   Past Medical History:   Diagnosis Date    [facility]     [email or web address]     Expressive aphasia     Hyperlipidemia     Hypertension     Impaired fasting glucose     Osteoarthritis     Vitamin D deficiency      Past Surgical History:   Procedure Laterality Date    JOINT REPLACEMENT      right knee     Social History   Social History     Substance and Sexual Activity   Alcohol Use Never    Frequency: Never     Social History     Substance and Sexual Activity   Drug Use Never     Social History Tobacco Use   Smoking Status Never Smoker   Smokeless Tobacco Never Used     Family History: History reviewed  No pertinent family history  Meds/Allergies     (Not in a hospital admission)  No Known Allergies    Objective   Vitals: Blood pressure 136/68, pulse 82, temperature 98 4 °F (36 9 °C), temperature source Oral, resp  rate 18, height 4' 11" (1 499 m), weight 55 6 kg (122 lb 9 2 oz), SpO2 99 %, not currently breastfeeding  PHYSICAL EXAM  Constitutional:  Well developed, well nourished, no acute distress, non toxic appearance demented    Eyes:   PERRL, EOMI, conjunctiva normal, sclera anicteric, no proptosis   HENT:  Atraumatic, external ears normal, nose normal, oropharynx moist, no pharyngeal exudates  Neck  no JVD, no bruits,  normal range of motion, no tenderness, supple, thyroid normal    Respiratory:  No respiratory distress, normal breath sounds B/L, no rales, no wheezing     Cardiovascular:  NSR, no M/G/R  GI:  Soft,  Normal BS,  ND,  NT,  No mass or bruits   :  No costovertebral angle tenderness   Extremities: No edema, no tenderness, no deformities  Normal pulses   Musculoskeletal:   Back - no midline tenderness,  FROM  Upper and lower extremities good range of motion of both hips good range of motion of both knees there is a well-healed surgical scar on the right knee she has a small abrasion on the right knee  SKIN:   no rash, warm and dry    Neurologic:  Alert & oriented x 3, CN 2-12 intact, normal motor function, normal sensory function, no focal deficits noted, gait normal   Psychiatric:  Speech and behavior appropriate normal judgement and insight      Lab Results: Lab Results: I have personally reviewed pertinent lab results  Labs Reviewed - No data to display  Imaging: I have personally reviewed pertinent reports  CT head without contrast   Final Result      No acute intracranial abnormality  Ventriculomegaly out of proportion to sulcal atrophy   A degree of normal pressure hydrocephalus is not excluded although similar to prior studies  The cerebellar tonsils appear low in position  Workstation performed: [id number]         XR hip/pelv 2-3 vws left    (Results Pending)   XR knee 4+ views left injury    (Results Pending)     EKG, Pathology, and Other Studies: I have personally reviewed pertinent films in PACS    the DJD no fracture hip negative for fracture    Assessment/Plan     ED Medical Decision Making:  Fall CT head hip x-ray and knee x-ray  1  Fall    2  Left knee injury    3   Left hip pain           Will Minor MD  10/07/19 2685

## 2019-10-18 ENCOUNTER — APPOINTMENT (EMERGENCY)
Dept: RADIOLOGY | Facility: HOSPITAL | Age: 74
DRG: 440 | End: 2019-10-18
Payer: COMMERCIAL

## 2019-10-18 ENCOUNTER — HOSPITAL ENCOUNTER (INPATIENT)
Facility: HOSPITAL | Age: 74
LOS: 2 days | Discharge: HOME | DRG: 440 | End: 2019-10-20
Attending: EMERGENCY MEDICINE | Admitting: HOSPITALIST
Payer: COMMERCIAL

## 2019-10-18 DIAGNOSIS — R74.8 ABNORMAL SERUM LEVEL OF LIPASE: ICD-10-CM

## 2019-10-18 DIAGNOSIS — R10.9 ABDOMINAL PAIN: Primary | ICD-10-CM

## 2019-10-18 DIAGNOSIS — M25.552 LEFT HIP PAIN: ICD-10-CM

## 2019-10-18 PROBLEM — F03.90 DEMENTIA (HCC): Status: ACTIVE | Noted: 2019-10-18

## 2019-10-18 PROBLEM — K85.90 ACUTE PANCREATITIS: Status: ACTIVE | Noted: 2019-10-18

## 2019-10-18 LAB
ALBUMIN SERPL BCP-MCNC: 3.8 G/DL (ref 3.5–5)
ALP SERPL-CCNC: 81 U/L (ref 46–116)
ALT SERPL W P-5'-P-CCNC: 18 U/L (ref 12–78)
ANION GAP SERPL CALCULATED.3IONS-SCNC: 9 MMOL/L (ref 4–13)
AST SERPL W P-5'-P-CCNC: 14 U/L (ref 5–45)
ATRIAL RATE: 86 BPM
BASOPHILS # BLD AUTO: 0.05 THOUSANDS/ΜL (ref 0–0.1)
BASOPHILS NFR BLD AUTO: 0 % (ref 0–1)
BILIRUB SERPL-MCNC: 0.51 MG/DL (ref 0.2–1)
BILIRUB UR QL STRIP: NEGATIVE
BUN SERPL-MCNC: 13 MG/DL (ref 5–25)
CALCIUM SERPL-MCNC: 9.2 MG/DL (ref 8.3–10.1)
CHLORIDE SERPL-SCNC: 107 MMOL/L (ref 100–108)
CLARITY UR: CLEAR
CLARITY, POC: CLEAR
CO2 SERPL-SCNC: 26 MMOL/L (ref 21–32)
COLOR UR: YELLOW
COLOR, POC: YELLOW
CREAT SERPL-MCNC: 0.74 MG/DL (ref 0.6–1.3)
EOSINOPHIL # BLD AUTO: 0.04 THOUSAND/ΜL (ref 0–0.61)
EOSINOPHIL NFR BLD AUTO: 0 % (ref 0–6)
ERYTHROCYTE [DISTWIDTH] IN BLOOD BY AUTOMATED COUNT: 13.4 % (ref 11.6–15.1)
GFR SERPL CREATININE-BSD FRML MDRD: 79 ML/MIN/1.73SQ M
GLUCOSE SERPL-MCNC: 91 MG/DL (ref 65–140)
GLUCOSE SERPL-MCNC: 93 MG/DL (ref 65–140)
GLUCOSE UR STRIP-MCNC: NEGATIVE MG/DL
HCT VFR BLD AUTO: 39.6 % (ref 34.8–46.1)
HGB BLD-MCNC: 12.9 G/DL (ref 11.5–15.4)
HGB UR QL STRIP.AUTO: NEGATIVE
IMM GRANULOCYTES # BLD AUTO: 0.07 THOUSAND/UL (ref 0–0.2)
IMM GRANULOCYTES NFR BLD AUTO: 1 % (ref 0–2)
KETONES UR STRIP-MCNC: NEGATIVE MG/DL
LEUKOCYTE ESTERASE UR QL STRIP: NEGATIVE
LIPASE SERPL-CCNC: 1604 U/L (ref 73–393)
LYMPHOCYTES # BLD AUTO: 1.25 THOUSANDS/ΜL (ref 0.6–4.47)
LYMPHOCYTES NFR BLD AUTO: 10 % (ref 14–44)
MCH RBC QN AUTO: 29 PG (ref 26.8–34.3)
MCHC RBC AUTO-ENTMCNC: 32.6 G/DL (ref 31.4–37.4)
MCV RBC AUTO: 89 FL (ref 82–98)
MONOCYTES # BLD AUTO: 1.44 THOUSAND/ΜL (ref 0.17–1.22)
MONOCYTES NFR BLD AUTO: 11 % (ref 4–12)
NEUTROPHILS # BLD AUTO: 9.89 THOUSANDS/ΜL (ref 1.85–7.62)
NEUTS SEG NFR BLD AUTO: 78 % (ref 43–75)
NITRITE UR QL STRIP: NEGATIVE
NRBC BLD AUTO-RTO: 0 /100 WBCS
P AXIS: 80 DEGREES
PH UR STRIP.AUTO: 7 [PH] (ref 4.5–8)
PLATELET # BLD AUTO: 321 THOUSANDS/UL (ref 149–390)
PMV BLD AUTO: 9.9 FL (ref 8.9–12.7)
POTASSIUM SERPL-SCNC: 3.7 MMOL/L (ref 3.5–5.3)
PR INTERVAL: 156 MS
PROT SERPL-MCNC: 7.3 G/DL (ref 6.4–8.2)
PROT UR STRIP-MCNC: NEGATIVE MG/DL
QRS AXIS: 71 DEGREES
QRSD INTERVAL: 74 MS
QT INTERVAL: 366 MS
QTC INTERVAL: 437 MS
RBC # BLD AUTO: 4.45 MILLION/UL (ref 3.81–5.12)
SODIUM SERPL-SCNC: 142 MMOL/L (ref 136–145)
SP GR UR STRIP.AUTO: 1.02 (ref 1–1.03)
T WAVE AXIS: 64 DEGREES
UROBILINOGEN UR QL STRIP.AUTO: 0.2 E.U./DL
VENTRICULAR RATE: 86 BPM
WBC # BLD AUTO: 12.74 THOUSAND/UL (ref 4.31–10.16)

## 2019-10-18 PROCEDURE — 93005 ELECTROCARDIOGRAM TRACING: CPT

## 2019-10-18 PROCEDURE — 36415 COLL VENOUS BLD VENIPUNCTURE: CPT | Performed by: EMERGENCY MEDICINE

## 2019-10-18 PROCEDURE — 99285 EMERGENCY DEPT VISIT HI MDM: CPT

## 2019-10-18 PROCEDURE — 80053 COMPREHEN METABOLIC PANEL: CPT | Performed by: EMERGENCY MEDICINE

## 2019-10-18 PROCEDURE — 85025 COMPLETE CBC W/AUTO DIFF WBC: CPT | Performed by: EMERGENCY MEDICINE

## 2019-10-18 PROCEDURE — 99223 1ST HOSP IP/OBS HIGH 75: CPT | Performed by: INTERNAL MEDICINE

## 2019-10-18 PROCEDURE — 82948 REAGENT STRIP/BLOOD GLUCOSE: CPT

## 2019-10-18 PROCEDURE — 73502 X-RAY EXAM HIP UNI 2-3 VIEWS: CPT

## 2019-10-18 PROCEDURE — 96361 HYDRATE IV INFUSION ADD-ON: CPT

## 2019-10-18 PROCEDURE — 76705 ECHO EXAM OF ABDOMEN: CPT

## 2019-10-18 PROCEDURE — 99222 1ST HOSP IP/OBS MODERATE 55: CPT | Performed by: SURGERY

## 2019-10-18 PROCEDURE — 81003 URINALYSIS AUTO W/O SCOPE: CPT

## 2019-10-18 PROCEDURE — 99285 EMERGENCY DEPT VISIT HI MDM: CPT | Performed by: EMERGENCY MEDICINE

## 2019-10-18 PROCEDURE — 87081 CULTURE SCREEN ONLY: CPT | Performed by: HOSPITALIST

## 2019-10-18 PROCEDURE — 96360 HYDRATION IV INFUSION INIT: CPT

## 2019-10-18 PROCEDURE — 74177 CT ABD & PELVIS W/CONTRAST: CPT

## 2019-10-18 PROCEDURE — 83690 ASSAY OF LIPASE: CPT | Performed by: EMERGENCY MEDICINE

## 2019-10-18 PROCEDURE — 93010 ELECTROCARDIOGRAM REPORT: CPT | Performed by: INTERNAL MEDICINE

## 2019-10-18 RX ORDER — DOCUSATE SODIUM 100 MG/1
100 CAPSULE, LIQUID FILLED ORAL DAILY
Status: DISCONTINUED | OUTPATIENT
Start: 2019-10-19 | End: 2019-10-20 | Stop reason: HOSPADM

## 2019-10-18 RX ORDER — ACETAMINOPHEN 325 MG/1
650 TABLET ORAL EVERY 6 HOURS PRN
Status: DISCONTINUED | OUTPATIENT
Start: 2019-10-18 | End: 2019-10-19

## 2019-10-18 RX ORDER — B-COMPLEX WITH VITAMIN C
1 TABLET ORAL
Status: DISCONTINUED | OUTPATIENT
Start: 2019-10-19 | End: 2019-10-20 | Stop reason: HOSPADM

## 2019-10-18 RX ORDER — MINERAL OIL AND PETROLATUM 150; 830 MG/G; MG/G
OINTMENT OPHTHALMIC
Status: DISCONTINUED | OUTPATIENT
Start: 2019-10-18 | End: 2019-10-20 | Stop reason: HOSPADM

## 2019-10-18 RX ORDER — ONDANSETRON 2 MG/ML
4 INJECTION INTRAMUSCULAR; INTRAVENOUS EVERY 4 HOURS PRN
Status: DISCONTINUED | OUTPATIENT
Start: 2019-10-18 | End: 2019-10-20 | Stop reason: HOSPADM

## 2019-10-18 RX ORDER — SODIUM CHLORIDE, SODIUM LACTATE, POTASSIUM CHLORIDE, CALCIUM CHLORIDE 600; 310; 30; 20 MG/100ML; MG/100ML; MG/100ML; MG/100ML
100 INJECTION, SOLUTION INTRAVENOUS CONTINUOUS
Status: DISCONTINUED | OUTPATIENT
Start: 2019-10-18 | End: 2019-10-19

## 2019-10-18 RX ORDER — HYDRALAZINE HYDROCHLORIDE 20 MG/ML
5 INJECTION INTRAMUSCULAR; INTRAVENOUS EVERY 6 HOURS PRN
Status: DISCONTINUED | OUTPATIENT
Start: 2019-10-18 | End: 2019-10-20 | Stop reason: HOSPADM

## 2019-10-18 RX ADMIN — SODIUM CHLORIDE, SODIUM LACTATE, POTASSIUM CHLORIDE, AND CALCIUM CHLORIDE 100 ML/HR: .6; .31; .03; .02 INJECTION, SOLUTION INTRAVENOUS at 20:06

## 2019-10-18 RX ADMIN — IOHEXOL 100 ML: 350 INJECTION, SOLUTION INTRAVENOUS at 13:58

## 2019-10-18 RX ADMIN — SODIUM CHLORIDE 500 ML: 0.9 INJECTION, SOLUTION INTRAVENOUS at 15:40

## 2019-10-18 RX ADMIN — WHITE PETROLATUM 57.7 %-MINERAL OIL 31.9 % EYE OINTMENT 1 APPLICATION: at 22:37

## 2019-10-18 NOTE — ASSESSMENT & PLAN NOTE
- presents with a lipase of 1,604  - per CT imaging, "Mild prominence of pancreatic duct without any obvious underlying pancreatic lesion  This could be correlated with pancreatic enzymes  Hyperenhancing lesion within the liver could represent a hemangioma but should be correlated with any history of chronic liver pathology    Interval follow-up elective MRI suggested "  - appreciate general surgery input - await RUQ ultrasound - LFTs otherwise unremarkable  - keep NPO overnight on IV fluids and trend lipase  - PRN pain/emesis control   - check serum triglyceride level   - discontinue ACEI (Lisinopril) for now as known to cause pancreatitis in a select population (although medication is chronic)

## 2019-10-18 NOTE — ED ATTENDING ATTESTATION
10/18/2019  I, Jen Salcido MD, saw and evaluated the patient  I have discussed the patient with the resident/non-physician practitioner and agree with the resident's/non-physician practitioner's findings, Plan of Care, and MDM as documented in the resident's/non-physician practitioner's note, except where noted  All available labs and Radiology studies were reviewed  I was present for key portions of any procedure(s) performed by the resident/non-physician practitioner and I was immediately available to provide assistance  At this point I agree with the current assessment done in the Emergency Department  I have conducted an independent evaluation of this patient a history and physical is as follows:   History of dementia Pt fell 3 days ago which was unwitnessed Pt co hip pain No thinners   No other co   PE: alert nad heart reg lungs clear abd soft tender llq some luq MDM: will do ct xray labs    ED Course         Critical Care Time  Procedures

## 2019-10-18 NOTE — ED NOTES
Patient transported to [address] [place] [address], [address] [place] [address]  10/18/19 0821

## 2019-10-18 NOTE — CONSULTS
Consultation - General Surgery   Jose Luis Burden 66 y o  female MRN: [id number]  Unit/Bed#: ED 29 Encounter: [id number]    Assessment/Plan     Assessment:  74yF presenting with hip pain following fall  Found to have elevated lipase and some abdominal pain  Her abdomen is intermittently tender to palpation throughout the abdomen not restricted to epigastrum  With an unremarkable abdominal exam and CT not showing pancreatic inflammation less concerned for true pancreatitis  Suspect this would come down with gentle IV hydration  Also given history of falls and the fact that she was brought here for increased ankle swelling would recommend medical team evaluation  Would give gentle IVF, check RUQ US, triglycerides and lipase in AM      History of Present Illness     HPI:  Jose Luis Burden is a 66 y o  female who presents from locked dementia unit  She has history of multiple falls for which she is brought to ED  Per daughter the nursing home sent her over here for increased swelling of the ankle  Per other notes she fell 3 days ago and was having hip pain  XR hip was negative  She was also expressing abdominal tenderness to palpation  Lipase of 1600 and normal LFTs  CT done was negative for pancreatic inflammation  There was mild prominence of pancreatic duct  The patient denies pain but is tender to deep palpation sometimes, in all 4 quadrants     WBC 12 7  Hgb 12 9  Cr 0 74  LFTs normal  Lipase 1604        Consults    Review of Systems   Unable to perform ROS: Dementia       Historical Information   Past Medical History:   Diagnosis Date    [facility]     [facility]     Expressive aphasia     Hyperlipidemia     Hypertension     Impaired fasting glucose     Osteoarthritis     Vitamin D deficiency      Past Surgical History:   Procedure Laterality Date    JOINT REPLACEMENT      right knee     Social History   Social History     Substance and Sexual Activity   Alcohol Use Never    Frequency: Never     Social History     Substance and Sexual Activity   Drug Use Never     Social History     Tobacco Use   Smoking Status Never Smoker   Smokeless Tobacco Never Used     Family History: History reviewed  No pertinent family history  Meds/Allergies   all current active meds have been reviewed, current meds:   No current facility-administered medications for this encounter  and PTA meds:   Prior to Admission Medications   Prescriptions Last Dose Informant Patient Reported? Taking?    Nutritional Supplements (ENSURE ACTIVE HIGH PROTEIN) LIQD 10/18/2019 at Unknown time  No Yes   Sig: Take 8 oz by mouth 2 (two) times a day   Patient taking differently: Take 8 oz by mouth daily    Nutritional Supplements (ENSURE ACTIVE HIGH PROTEIN) LIQD Not Taking at Unknown time  No No   Sig: Take 8 oz by mouth 2 (two) times a day   Patient not taking: Reported on 10/18/2019   acetaminophen (TYLENOL) 325 mg tablet   No No   Sig: Take 2 tablets (650 mg total) by mouth every 6 (six) hours as needed for mild pain, moderate pain, headaches or fever   artificial tear (LUBRIFRESH P M ) 83-15 % ophthalmic ointment 10/18/2019 at Unknown time  No Yes   Sig: Administer to both eyes daily at bedtime   calcium carbonate-vitamin D (OSCAL-D) 500 mg-200 units per tablet 10/18/2019 at Unknown time  No Yes   Sig: Take 1 tablet by mouth daily with breakfast   cyanocobalamin 1000 MCG tablet 10/18/2019 at Unknown time  No Yes   Sig: Take 1 tablet (1,000 mcg total) by mouth daily   docusate sodium (COLACE) 100 mg capsule 10/18/2019 at Unknown time  No Yes   Sig: Take 1 capsule (100 mg total) by mouth daily   lisinopril (ZESTRIL) 5 mg tablet 10/18/2019 at Unknown time  No Yes   Sig: Take 1 tablet (5 mg total) by mouth daily   rivastigmine (EXELON) 4 5 MG capsule 10/18/2019 at Unknown time  No Yes   Sig: Take 1 capsule (4 5 mg total) by mouth daily      Facility-Administered Medications: None     No Known Allergies    Objective   First Vitals:   Blood Pressure: 166/74 (10/18/19 1055)  Pulse: 88 (10/18/19 1055)  Temperature: 98 4 °F (36 9 °C) (10/18/19 1056)  Temp Source: Oral (10/18/19 1056)  Respirations: 18 (10/18/19 1055)  Height: 4' 11" (149 9 cm) (10/18/19 1055)  Weight - Scale: 55 6 kg (122 lb 9 2 oz) (10/18/19 1055)  SpO2: 97 % (10/18/19 1055)    Current Vitals:   Blood Pressure: 142/65 (10/18/19 1615)  Pulse: 80 (10/18/19 1615)  Temperature: 98 4 °F (36 9 °C) (10/18/19 1056)  Temp Source: Oral (10/18/19 1056)  Respirations: 18 (10/18/19 1603)  Height: 4' 11" (149 9 cm) (10/18/19 1055)  Weight - Scale: 55 6 kg (122 lb 9 2 oz) (10/18/19 1055)  SpO2: 98 % (10/18/19 1615)      Intake/Output Summary (Last 24 hours) at 10/18/2019 1656  Last data filed at 10/18/2019 1332  Gross per 24 hour   Intake --   Output 500 ml   Net -500 ml       Invasive Devices     Peripheral Intravenous Line            Peripheral IV 07/23/19 Dorsal (posterior); Right Forearm 87 days    Peripheral IV 10/18/19 Right Antecubital less than 1 day                Physical Exam   Constitutional: No distress  HENT:   Head: Normocephalic and atraumatic  Neck: No JVD present  No tracheal deviation present  Cardiovascular: Normal rate and regular rhythm  Pulmonary/Chest: Effort normal  No respiratory distress  Abdominal: Soft  She exhibits no mass  There is no rebound and no guarding  Tender some times when I would palpate her abdomen  This is throughout the abdomen and not localized to any specific area  Musculoskeletal: She exhibits no edema or deformity  Neurological: She is alert  Skin: Capillary refill takes less than 2 seconds  No rash noted  She is not diaphoretic  No erythema  Psychiatric:   Dementia  Pleasant but confused  Vitals reviewed  Lab Results:   I have personally reviewed pertinent lab results    , CBC:   Lab Results   Component Value Date    WBC 12 74 (H) 10/18/2019    HGB 12 9 10/18/2019    HCT 39 6 10/18/2019    MCV 89 10/18/2019  10/18/2019    MCH 29 0 10/18/2019    MCHC 32 6 10/18/2019    RDW 13 4 10/18/2019    MPV 9 9 10/18/2019    NRBC 0 10/18/2019   , CMP:   Lab Results   Component Value Date    SODIUM 142 10/18/2019    K 3 7 10/18/2019     10/18/2019    CO2 26 10/18/2019    BUN 13 10/18/2019    CREATININE 0 74 10/18/2019    CALCIUM 9 2 10/18/2019    AST 14 10/18/2019    ALT 18 10/18/2019    ALKPHOS 81 10/18/2019    EGFR 79 10/18/2019   , Coagulation: No results found for: PT, INR, APTT  Imaging: I have personally reviewed pertinent reports  EKG, Pathology, and Other Studies: I have personally reviewed pertinent reports  Counseling / Coordination of Care  Total floor / unit time spent today 25 minutes  Greater than 50% of total time was spent with the patient and / or family counseling and / or coordination of care  A description of the counseling / coordination of care: 25

## 2019-10-18 NOTE — H&P
History & Physical - [facility] Internal Medicine  Patient: Meenu Griggs 66 y o  female MRN: [id number]  Unit/Bed#: [facility] 833-01 Encounter: [id number]  Primary Care Provider: Skyler Beck MD  Date & Time of Admission: 10/18/2019 10:52 AM        Assessment & Plan:    * Acute pancreatitis  Assessment & Plan  - presents with a lipase of 1,604  - per CT imaging, "Mild prominence of pancreatic duct without any obvious underlying pancreatic lesion  This could be correlated with pancreatic enzymes  Hyperenhancing lesion within the liver could represent a hemangioma but should be correlated with any history of chronic liver pathology    Interval follow-up elective MRI suggested "  - appreciate general surgery input - await RUQ ultrasound - LFTs otherwise unremarkable  - keep NPO overnight on IV fluids and trend lipase  - PRN pain/emesis control   - check serum triglyceride level   - discontinue ACEI (Lisinopril) for now as known to cause pancreatitis in a select population (although medication is chronic)    Left hip pain status post fall  Assessment & Plan  - XR imaging negative for acute osseous abnormalities  - fall precautions - PRN pain control - initiate calcium/vitamin-D supplementation  - with associated history of dementia, avoid unnecessary sedating agents    Essential hypertension  Assessment & Plan  - low-sodium restriction once on oral diet  - holding Zestril as ACEIs may cause pancreatitis in a select population (chronicity noted however) - PRN IV Hydralazine for BP spikes  - optimize pain control if necessary    Frontotemporal dementia without behavioral disturbance  Assessment & Plan  - continue Exelon  - fall precautions implemented  - supportive care - outpatient follow-up       DVT Prophylaxis:  Heparin SC    Code Status:  Full code (discussed repeatedly with the patient)    Discussion with:  Patient at bedside    Anticipated Length of Stay:  Patient will be admitted on an Inpatient basis with an anticipated length of stay of greater than 2 midnights  Justification for Hospital Stay:  Acute pancreatitis with left hip pain status post fall requiring pain/emesis control and lipase monitoring with PT/OT evaluation  Total Time for Visit, including Counseling / Coordination of Care: 72 minutes  Greater than 50% of this total time spent on direct patient counseling and coordination of care  Chief Complaint:  Hip pain status post fall      History of Present Illness:    Natalie Carmen is a 66 y o  female who presents with from her nursing home after sustaining a an unwitnessed fall a few days ago  Over the last day or so, she had been complaining of worsening left hip pain and the medical staff at her nursing facility out of concern, have her present to the ER for further evaluation  In the ER, XR imaging of the left hip was fortunately unremarkable for acute fracture  Blood work did reveal an elevated lipase checked due to nonspecific complaints of abdominal pain with CT imaging revealing suspicion of possible pancreatitis  Upon my encounter, the patient presents with her baseline dementia and is therefore an unreliable historian  She still complains of intermittent left hip and abdominal discomfort, however, has a relatively pleasant demeanor  Review of Systems:    Review of Systems - A thorough 12 point review systems was conducted  Pertinent positives and negatives are mentioned in the history of present illness  Past Medical and Surgical History:     Past Medical History:   Diagnosis Date    [facility] [address] [facility]     Diabetes mellitus [facility] [address] [facility]     Expressive aphasia     Hyperlipidemia     Hypertension     Impaired fasting glucose     Osteoarthritis     Vitamin D deficiency        Past Surgical History:   Procedure Laterality Date    JOINT REPLACEMENT      right knee         Medications & Allergies:    Prior to Admission medications    Medication Sig Start Date End Date Taking? Authorizing Provider   artificial tear (LUBRIFRESH P M ) 83-15 % ophthalmic ointment Administer to both eyes daily at bedtime 5/3/19  Yes Charlie Rascon MD   calcium carbonate-vitamin D (OSCAL-D) 500 mg-200 units per tablet Take 1 tablet by mouth daily with breakfast 5/4/19  Yes Charlie Rascon MD   cyanocobalamin 1000 MCG tablet Take 1 tablet (1,000 mcg total) by mouth daily 5/3/19  Yes Charlie Rascon MD   docusate sodium (COLACE) 100 mg capsule Take 1 capsule (100 mg total) by mouth daily 5/3/19  Yes Charlie Rascon MD   lisinopril (ZESTRIL) 5 mg tablet Take 1 tablet (5 mg total) by mouth daily 5/3/19 5/2/20 Yes Charlie Rascon MD   Nutritional Supplements (ENSURE ACTIVE HIGH PROTEIN) LIQD Take 8 oz by mouth 2 (two) times a day  Patient taking differently: Take 8 oz by mouth daily  5/3/19  Yes Charlie Rascon MD   rivastigmine (EXELON) 4 5 MG capsule Take 1 capsule (4 5 mg total) by mouth daily 5/3/19  Yes Charlie Rascon MD   acetaminophen (TYLENOL) 325 mg tablet Take 2 tablets (650 mg total) by mouth every 6 (six) hours as needed for mild pain, moderate pain, headaches or fever 5/3/19   Charlie Rascon MD   Nutritional Supplements (ENSURE ACTIVE HIGH PROTEIN) LIQD Take 8 oz by mouth 2 (two) times a day  Patient not taking: Reported on 10/18/2019 7/24/19   Yonatan Garcia MD         Allergies: No Known Allergies      Social History:    Substance Use History:   Social History     Substance and Sexual Activity   Alcohol Use Never    Frequency: Never     Social History     Tobacco Use   Smoking Status Never Smoker   Smokeless Tobacco Never Used     Social History     Substance and Sexual Activity   Drug Use Never         Family History:    Cannot recall pertinent family history at this time        Physical Exam:     Vitals:   Blood Pressure: 119/73 (10/18/19 1913)  Pulse: 85 (10/18/19 1913)  Temperature: 99 5 °F (37 5 °C) (10/18/19 1913)  Temp Source: Oral (10/18/19 1056)  Respirations: 17 (10/18/19 1913)  Height: 4' 11" (149 9 cm) (10/18/19 1913)  Weight - Scale: 54 4 kg (120 lb) (10/18/19 1913)  SpO2: 97 % (10/18/19 1913)      GENERAL:  Weak/fatigued   HEAD:  Normocephalic - atraumatic  EYES: PERRL - EOMI   MOUTH:  Mucosa dry  NECK:  Supple - full range of motion  CARDIAC:  Regular rate/rhythm - S1/S2 positive  PULMONARY:  Clear breath sounds bilaterally - nonlabored respirations  ABDOMEN:  Soft - RUQ/epigastric tenderness to palpation - nondistended - active bowel sounds  MUSCULOSKELETAL:  Motor strength/range of motion deconditioned and limited of the LLE with left hip tenderness  NEUROLOGIC:  Awake and oriented to first name only  SKIN:  Chronic wrinkles/blemishes       Additional Data:     Labs & Recent Cultures:    Results from last 7 days   Lab Units 10/18/19  1251   WBC Thousand/uL 12 74*   HEMOGLOBIN g/dL 12 9   HEMATOCRIT % 39 6   PLATELETS Thousands/uL 321   NEUTROS PCT % 78*   LYMPHS PCT % 10*   MONOS PCT % 11   EOS PCT % 0     Results from last 7 days   Lab Units 10/18/19  1250   SODIUM mmol/L 142   POTASSIUM mmol/L 3 7   CHLORIDE mmol/L 107   CO2 mmol/L 26   BUN mg/dL 13   CREATININE mg/dL 0 74   ANION GAP mmol/L 9   CALCIUM mg/dL 9 2   ALBUMIN g/dL 3 8   TOTAL BILIRUBIN mg/dL 0 51   ALK PHOS U/L 81   ALT U/L 18   AST U/L 14   GLUCOSE RANDOM mg/dL 93         Results from last 7 days   Lab Units 10/18/19  1605   POC GLUCOSE mg/dl 91           Imaging:     Xr Hip/pelv 2-3 Vws Left If Performed    Result Date: 10/18/2019  Narrative: LEFT HIP INDICATION:   Fall, left hip pain  COMPARISON:  10/7/2019 VIEWS:  XR HIP/PELV 2-3 VWS LEFT  W PELVIS IF PERFORMED Images: 4 FINDINGS: There is no acute fracture or dislocation  Mild left hip osteoarthritis is seen  Right total hip arthroplasty  No lytic or blastic osseous lesions  Soft tissues are unremarkable  Lumbar levoscoliosis with multilevel degenerative changes and mild degenerative changes pubic symphysis  Impression: No acute osseous abnormality  Degenerative changes as described  Workstation performed: [id number]       Ct Abdomen Pelvis With Contrast    Result Date: 10/18/2019  Narrative: CT ABDOMEN AND PELVIS WITH IV CONTRAST INDICATION:   Abdominal pain, acute, nonlocalized  COMPARISON:  None  TECHNIQUE:  CT examination of the abdomen and pelvis was performed  Axial, sagittal, and coronal 2D reformatted images were created from the source data and submitted for interpretation  Radiation dose length product (DLP) for this visit:  276 7 mGy-cm   This examination, like all CT scans performed in the [facility], was performed utilizing techniques to minimize radiation dose exposure, including the use of iterative reconstruction and automated exposure control  IV Contrast:  100 mL of iohexol (OMNIPAQUE)  350 Enteric Contrast:  Enteric contrast was not administered  FINDINGS: ABDOMEN LOWER CHEST:  No clinically significant abnormality identified in the visualized lower chest  LIVER/BILIARY TREE:  Hyperenhancing lesion is seen in segment 7 on image 10, series 2 which measures 1 3 cm in size  Mauri Prashant GALLBLADDER:  No calcified gallstones  No pericholecystic inflammatory change  SPLEEN:  Unremarkable  PANCREAS:  The pancreatic duct is mildly dilated just prior to the biliary papilla measuring up to 5 mm although tapering to more normal caliber towards the tail measuring 2 mm  The pancreatic parenchyma is unremarkable  The common bile duct is not dilated  ADRENAL GLANDS:  Unremarkable  KIDNEYS/URETERS:  Small hypodensities in the right kidney most likely represent small cysts  No hydronephrosis  Some higher density than collecting system may be related to intraoperative injection or if you calculi m STOMACH AND BOWEL:  No bowel dilatation is seen  There is fecal stasis in the rectum which is mildly distended  Sigmoid diverticulosis is seen without CT evidence of diverticulitis  APPENDIX:  No findings to suggest appendicitis  ABDOMINOPELVIC CAVITY:  No ascites or free intraperitoneal air  No lymphadenopathy  VESSELS:  Minimal aneurysmal dilatation of the aorta measures 2 7 cm  PELVIS REPRODUCTIVE ORGANS:  Unremarkable for patient's age  URINARY BLADDER:  Unremarkable  ABDOMINAL WALL/INGUINAL REGIONS:  Unremarkable  OSSEOUS STRUCTURES:  Right hip replacement is seen  Impression: Evidence of mild fecal stasis more so in the rectum  Mild prominence of pancreatic duct without any obvious underlying pancreatic lesion  This could be correlated with pancreatic enzymes  Hyperenhancing lesion within the liver could represent a hemangioma but should be correlated with any history of chronic liver pathology  Interval follow-up elective MRI suggested  The study was marked in [facility] for immediate notification  Workstation performed: [id number]           ** Please Note: This note is constructed using a voice recognition dictation system  An occasional wrong word/phrase or sound-a-like substitution may have been picked up by dictation device due to the inherent limitations of voice recognition software  Read the chart carefully and recognize, using reasonable context, where substitutions may have occurred  **

## 2019-10-18 NOTE — ED PROVIDER NOTES
History  Chief Complaint   Patient presents with    Hip Pain     Patient presents to the E R  with left hip pain  Patient resides at [facility] Dementia unit and comes to us with a report of the patient having an unwitnessed fall 3 days ago     65 yo F with h/o frontotemporal dementia, DM, HTN, HLD, ambulatory dysfunction presenting for complaints of left hip pain to staff at [facility] where she resides for her dementia  There was an unwitnessed fall onto carpet 3 days ago, unclear how long pt was on the ground for, did not have any obvious signs of trauma, was conscious when found, and was not complaining of anything afterwards  Since yesterday has been complaining of left hip pain only, no other complaints  There have been no reported changes in her baseline mental status, PO intake, diarrhea, vomiting, fevers, or other changes  Patient herself is complaining of left hip pain at presentation  History provided by:  Patient   used: No        Prior to Admission Medications   Prescriptions Last Dose Informant Patient Reported? Taking?    Nutritional Supplements (ENSURE ACTIVE HIGH PROTEIN) LIQD 10/18/2019 at Unknown time  No Yes   Sig: Take 8 oz by mouth 2 (two) times a day   Patient taking differently: Take 8 oz by mouth daily    Nutritional Supplements (ENSURE ACTIVE HIGH PROTEIN) LIQD Not Taking at Unknown time  No No   Sig: Take 8 oz by mouth 2 (two) times a day   Patient not taking: Reported on 10/18/2019   acetaminophen (TYLENOL) 325 mg tablet   No No   Sig: Take 2 tablets (650 mg total) by mouth every 6 (six) hours as needed for mild pain, moderate pain, headaches or fever   artificial tear (LUBRIFRESH P M ) 83-15 % ophthalmic ointment 10/18/2019 at Unknown time  No Yes   Sig: Administer to both eyes daily at bedtime   calcium carbonate-vitamin D (OSCAL-D) 500 mg-200 units per tablet 10/18/2019 at Unknown time  No Yes   Sig: Take 1 tablet by mouth daily with breakfast   cyanocobalamin 1000 MCG tablet   No No   Sig: Take 1 tablet (1,000 mcg total) by mouth daily   docusate sodium (COLACE) 100 mg capsule 10/18/2019 at Unknown time  No Yes   Sig: Take 1 capsule (100 mg total) by mouth daily   lisinopril (ZESTRIL) 5 mg tablet 10/18/2019 at Unknown time  No Yes   Sig: Take 1 tablet (5 mg total) by mouth daily   rivastigmine (EXELON) 4 5 MG capsule 10/18/2019 at Unknown time  No Yes   Sig: Take 1 capsule (4 5 mg total) by mouth daily      Facility-Administered Medications: None       Past Medical History:   Diagnosis Date    [facility]     [facility]     Expressive aphasia     Hyperlipidemia     Hypertension     Impaired fasting glucose     Osteoarthritis     Vitamin D deficiency        Past Surgical History:   Procedure Laterality Date    JOINT REPLACEMENT      right knee       History reviewed  No pertinent family history  I have reviewed and agree with the history as documented  Social History     Tobacco Use    Smoking status: Never Smoker    Smokeless tobacco: Never Used   Substance Use Topics    Alcohol use: Never     Frequency: Never    Drug use: Never        Review of Systems   Unable to perform ROS: Dementia       Physical Exam  ED Triage Vitals   Temperature Pulse Respirations Blood Pressure SpO2   10/18/19 1056 10/18/19 1055 10/18/19 1055 10/18/19 1055 10/18/19 1055   98 4 °F (36 9 °C) 88 18 166/74 97 %      Temp Source Heart Rate Source Patient Position - Orthostatic VS BP Location FiO2 (%)   10/18/19 1056 10/18/19 1149 10/18/19 1055 10/18/19 1055 --   Oral Monitor Lying Right arm       Pain Score       10/18/19 1055       Worst Possible Pain             Orthostatic Vital Signs  Vitals:    10/19/19 1555 10/19/19 2312 10/20/19 0654 10/20/19 1505   BP: 144/71 107/57 106/56 152/72   Pulse: 86 79 78 76   Patient Position - Orthostatic VS:           Physical Exam   Constitutional: She appears well-developed and well-nourished   No distress  HENT:   Head: Normocephalic and atraumatic  Eyes: Pupils are equal, round, and reactive to light  Conjunctivae are normal    Neck: Normal range of motion  Cardiovascular: Normal rate, regular rhythm and normal heart sounds  No murmur heard  Pulmonary/Chest: Effort normal and breath sounds normal  No respiratory distress  She has no wheezes  She has no rales  Abdominal: Soft  Bowel sounds are normal  She exhibits no distension  There is tenderness (Intermittent diffuse tenderness to palpation of abdomen, focal at left lower quadrant at times  )  Musculoskeletal: Normal range of motion  She exhibits no edema, tenderness or deformity  No deformities left hip or left lower extremity noted   Neurological: She is alert  Disoriented to place, time, present  Skin: Skin is warm and dry  No rash noted  No pallor  Psychiatric: She has a normal mood and affect  Nursing note and vitals reviewed        ED Medications  Medications   iohexol (OMNIPAQUE) 350 MG/ML injection (MULTI-DOSE) 100 mL (100 mL Intravenous Given 10/18/19 1358)   sodium chloride 0 9 % bolus 500 mL (500 mL Intravenous New Bag 10/18/19 1540)       Diagnostic Studies  Results Reviewed     Procedure Component Value Units Date/Time    Triglycerides [[id number]]  (Normal) Collected:  10/19/19 0534    Lab Status:  Final result Specimen:  Blood Updated:  10/19/19 1014     Triglycerides 70 mg/dL     Comprehensive metabolic panel [[id number]]  (Abnormal) Collected:  10/19/19 0534    Lab Status:  Final result Specimen:  Blood from Hand, Left Updated:  10/19/19 0644     Sodium 140 mmol/L      Potassium 3 7 mmol/L      Chloride 110 mmol/L      CO2 24 mmol/L      ANION GAP 6 mmol/L      BUN 10 mg/dL      Creatinine 0 55 mg/dL      Glucose 84 mg/dL      Calcium 9 0 mg/dL      AST 12 U/L      ALT 16 U/L      Alkaline Phosphatase 85 U/L      Total Protein 6 2 g/dL      Albumin 2 8 g/dL      Total Bilirubin 0 55 mg/dL      eGFR 91 ml/min/1 73sq m Narrative:       National Kidney Disease Foundation guidelines for Chronic Kidney Disease (CKD):     Stage 1 with normal or high GFR (GFR > 90 mL/min/1 73 square meters)    Stage 2 Mild CKD (GFR = 60-89 mL/min/1 73 square meters)    Stage 3A Moderate CKD (GFR = 45-59 mL/min/1 73 square meters)    Stage 3B Moderate CKD (GFR = 30-44 mL/min/1 73 square meters)    Stage 4 Severe CKD (GFR = 15-29 mL/min/1 73 square meters)    Stage 5 End Stage CKD (GFR <15 mL/min/1 73 square meters)  Note: GFR calculation is accurate only with a steady state creatinine    Lipase [[id number]]  (Normal) Collected:  10/19/19 0534    Lab Status:  Final result Specimen:  Blood from Hand, Left Updated:  10/19/19 0642     Lipase 113 u/L     CBC and differential [[id number]] Collected:  10/19/19 0534    Lab Status:  Final result Specimen:  Blood from Hand, Left Updated:  10/19/19 0604     WBC 7 53 Thousand/uL      RBC 4 06 Million/uL      Hemoglobin 11 9 g/dL      Hematocrit 36 7 %      MCV 90 fL      MCH 29 3 pg      MCHC 32 4 g/dL      RDW 13 2 %      MPV 9 9 fL      Platelets 215 Thousands/uL      nRBC 0 /100 WBCs      Neutrophils Relative 63 %      Immat GRANS % 0 %      Lymphocytes Relative 24 %      Monocytes Relative 10 %      Eosinophils Relative 2 %      Basophils Relative 1 %      Neutrophils Absolute 4 70 Thousands/µL      Immature Grans Absolute 0 03 Thousand/uL      Lymphocytes Absolute 1 79 Thousands/µL      Monocytes Absolute 0 78 Thousand/µL      Eosinophils Absolute 0 18 Thousand/µL      Basophils Absolute 0 05 Thousands/µL     Fingerstick Glucose (POCT) [[id number]]  (Normal) Collected:  10/18/19 1605    Lab Status:  Final result Updated:  10/18/19 1606     POC Glucose 91 mg/dl     POCT urinalysis dipstick [[id number]]  (Normal) Resulted:  10/18/19 1405    Lab Status:  Final result Specimen:  Urine Updated:  10/18/19 1405     Color, UA yellow     Clarity, UA clear    ED Urine Macroscopic [[id number]] Collected:  10/18/19 1407    Lab Status:  Final result Specimen:  Urine Updated:  10/18/19 1404     Color, UA Yellow     Clarity, UA Clear     pH, UA 7 0     Leukocytes, UA Negative     Nitrite, UA Negative     Protein, UA Negative mg/dl      Glucose, UA Negative mg/dl      Ketones, UA Negative mg/dl      Urobilinogen, UA 0 2 E U /dl      Bilirubin, UA Negative     Blood, UA Negative     Specific Gravity, UA 1 020    Narrative:       CLINITEK RESULT    Lipase [[id number]]  (Abnormal) Collected:  10/18/19 1250    Lab Status:  Final result Specimen:  Blood from Arm, Left Updated:  10/18/19 1327     Lipase 1,604 u/L     Comprehensive metabolic panel [[id number]] Collected:  10/18/19 1250    Lab Status:  Final result Specimen:  Blood from Arm, Left Updated:  10/18/19 1326     Sodium 142 mmol/L      Potassium 3 7 mmol/L      Chloride 107 mmol/L      CO2 26 mmol/L      ANION GAP 9 mmol/L      BUN 13 mg/dL      Creatinine 0 74 mg/dL      Glucose 93 mg/dL      Calcium 9 2 mg/dL      AST 14 U/L      ALT 18 U/L      Alkaline Phosphatase 81 U/L      Total Protein 7 3 g/dL      Albumin 3 8 g/dL      Total Bilirubin 0 51 mg/dL      eGFR 79 ml/min/1 73sq m     Narrative:       [employer] guidelines for Chronic Kidney Disease (CKD):     Stage 1 with normal or high GFR (GFR > 90 mL/min/1 73 square meters)    Stage 2 Mild CKD (GFR = 60-89 mL/min/1 73 square meters)    Stage 3A Moderate CKD (GFR = 45-59 mL/min/1 73 square meters)    Stage 3B Moderate CKD (GFR = 30-44 mL/min/1 73 square meters)    Stage 4 Severe CKD (GFR = 15-29 mL/min/1 73 square meters)    Stage 5 End Stage CKD (GFR <15 mL/min/1 73 square meters)  Note: GFR calculation is accurate only with a steady state creatinine    CBC and differential [[id number]]  (Abnormal) Collected:  10/18/19 1251    Lab Status:  Final result Specimen:  Blood from Arm, Left Updated:  10/18/19 1304     WBC 12 74 Thousand/uL      RBC 4 45 Million/uL      Hemoglobin 12 9 g/dL Hematocrit 39 6 %      MCV 89 fL      MCH 29 0 pg      MCHC 32 6 g/dL      RDW 13 4 %      MPV 9 9 fL      Platelets 631 Thousands/uL      nRBC 0 /100 WBCs      Neutrophils Relative 78 %      Immat GRANS % 1 %      Lymphocytes Relative 10 %      Monocytes Relative 11 %      Eosinophils Relative 0 %      Basophils Relative 0 %      Neutrophils Absolute 9 89 Thousands/µL      Immature Grans Absolute 0 07 Thousand/uL      Lymphocytes Absolute 1 25 Thousands/µL      Monocytes Absolute 1 44 Thousand/µL      Eosinophils Absolute 0 04 Thousand/µL      Basophils Absolute 0 05 Thousands/µL                  US gallbladder   Final Result by Faina Long MD (10/20 8730)      Probable gallstone  The common duct is at the upper limits of normal in size  Echogenic liver lesion and pancreatic duct prominence is not as well appreciated on this study  MRI/MRCP would still be useful  The study was  marked in EPIC for significant notification  Workstation performed: [id number]         CT abdomen pelvis with contrast   Final Result by Faina Long MD (10/18 0828)      Evidence of mild fecal stasis more so in the rectum  Mild prominence of pancreatic duct without any obvious underlying pancreatic lesion  This could be correlated with pancreatic enzymes  Hyperenhancing lesion within the liver could represent a hemangioma but should be correlated with any history of chronic liver pathology  Interval follow-up elective MRI suggested  The study was marked in [facility] for immediate notification  Workstation performed: [id number]         XR hip/pelv 2-3 vws left if performed   Final Result by Marita Webster DO (10/18 9582)      No acute osseous abnormality  Degenerative changes as described              Workstation performed: [id number]               Procedures  Procedures        ED Course  ED Course as of Oct 23 1928   Fri Oct 18, 2019   1334 Lipase(!): 1,604   1519 To be evaluated by surgery  MDM  Number of Diagnoses or Management Options  Abdominal pain: new and requires workup  Abnormal serum level of lipase: new and requires workup  Diagnosis management comments: A 55-year-old female presenting for evaluation of left hip pain with reported unwitnessed fall 3 days ago, on exam does have intermittent tenderness to palpation of the abdomen at different areas, most pronounced at left lower quadrant  Workup does show lipase of 1604, CT abdomen pelvis shows mild prominence of the pancreatic duct with no abnormalities noted of the pancreas itself  Patient was evaluated by surgery, recommended gentle IV hydration and trending of lipase levels as an inpatient  Will admit to Medicine, right upper quadrant ultrasound ordered         Amount and/or Complexity of Data Reviewed  Clinical lab tests: ordered and reviewed  Tests in the radiology section of CPT®: ordered and reviewed  Tests in the medicine section of CPT®: ordered and reviewed  Discussion of test results with the performing providers: yes  Decide to obtain previous medical records or to obtain history from someone other than the patient: yes  Obtain history from someone other than the patient: yes  Review and summarize past medical records: yes  Discuss the patient with other providers: yes  Independent visualization of images, tracings, or specimens: yes    Risk of Complications, Morbidity, and/or Mortality  Presenting problems: low  Diagnostic procedures: minimal  Management options: minimal    Patient Progress  Patient progress: stable      Disposition  Final diagnoses:   Abdominal pain   Abnormal serum level of lipase     Time reflects when diagnosis was documented in both MDM as applicable and the Disposition within this note     Time User Action Codes Description Comment    10/18/2019  6:05 PM Karie Rai Add [R10 9] Abdominal pain     10/18/2019  6:05 PM Yas Rai Add [R74 8] Abnormal serum level of lipase     10/20/2019  3:40 PM Jason Peters [K74 [id number]] Left hip pain status post fall       ED Disposition     ED Disposition Condition Date/Time Comment    Admit Stable Fri Oct 18, 2019  6:05 PM Case was discussed with Tito Ellison and the patient's admission status was agreed to be Admission Status: inpatient status to the service of Dr Tito Ellison MD Documentation      Most Recent Value   Accepting Facility Name, [facility] [place] [facility] [place] (Name & Tel number)  [facility]   Transported by (Company and Unit #)  [facility] Name, [facility] [place] [facility] [place] (Name & Tel number)  [facility]   Transport Mode  Other (Comment)   Transported by [employer] and Unit #)  [facility]   Level of Care  -- [wheelchair van]   Transfer Date  10/20/19   Transfer Time  1600      Follow-up Information     Follow up With Specialties Details Why [address] [place], [place] [address]kojo Jo MD Family Medicine Schedule an appointment as soon as possible for a visit in 1 week(s)  [address] [place] [id number] [phone]            Discharge Medication List as of 10/20/2019  3:52 PM      CONTINUE these medications which have CHANGED    Details   !! acetaminophen (TYLENOL) 325 mg tablet Take 3 tablets (975 mg total) by mouth every 8 (eight) hours for 3 days, Starting Sun 10/20/2019, Until Wed 10/23/2019, No Print       !! - Potential duplicate medications found  Please discuss with provider        CONTINUE these medications which have NOT CHANGED    Details   artificial tear (LUBRIFRESH P M ) 83-15 % ophthalmic ointment Administer to both eyes daily at bedtime, Starting Fri 5/3/2019, Print      calcium carbonate-vitamin D (OSCAL-D) 500 mg-200 units per tablet Take 1 tablet by mouth daily with breakfast, Starting Sat 5/4/2019, Print      docusate sodium (COLACE) 100 mg capsule Take 1 capsule (100 mg total) by mouth daily, Starting Fri 5/3/2019, Print      lisinopril (ZESTRIL) 5 mg tablet Take 1 tablet (5 mg total) by mouth daily, Starting Fri 5/3/2019, Until Sat 5/2/2020, Print      !! Nutritional Supplements (ENSURE ACTIVE HIGH PROTEIN) LIQD Take 8 oz by mouth 2 (two) times a day, Starting Fri 5/3/2019, Print      rivastigmine (EXELON) 4 5 MG capsule Take 1 capsule (4 5 mg total) by mouth daily, Starting Fri 5/3/2019, Print      !! acetaminophen (TYLENOL) 325 mg tablet Take 2 tablets (650 mg total) by mouth every 6 (six) hours as needed for mild pain, moderate pain, headaches or fever, Starting Fri 5/3/2019, Print      cyanocobalamin 1000 MCG tablet Take 1 tablet (1,000 mcg total) by mouth daily, Starting Fri 5/3/2019, Print      !! Nutritional Supplements (ENSURE ACTIVE HIGH PROTEIN) LIQD Take 8 oz by mouth 2 (two) times a day, Starting Wed 7/24/2019, No Print       !! - Potential duplicate medications found  Please discuss with provider  Outpatient Discharge Orders   Discharge Diet     Discharge Condtion:  Stabilized     Patient Aware of Diagnosis: No     Free of Communicable Disease:   Yes     Physical Therapy Eval And Treat     Occupational Therapy Eval and Treat       ED Provider  Attending physically available and evaluated Taz Guthrie I managed the patient along with the ED Attending      Electronically Signed by         Rosina Samano MD  10/23/19 8124

## 2019-10-18 NOTE — ASSESSMENT & PLAN NOTE
- XR imaging negative for acute osseous abnormalities  - fall precautions - PRN pain control - initiate calcium/vitamin-D supplementation  - with associated history of dementia, avoid unnecessary sedating agents

## 2019-10-18 NOTE — ED NOTES
Surgical team at the bedside with the pt at this time       Aston Escalera, [facility]  10/18/19 4047

## 2019-10-18 NOTE — ASSESSMENT & PLAN NOTE
- low-sodium restriction once on oral diet  - holding Zestril as ACEIs may cause pancreatitis in a select population (chronicity noted however) - PRN IV Hydralazine for BP spikes  - optimize pain control if necessary

## 2019-10-19 LAB
ALBUMIN SERPL BCP-MCNC: 2.8 G/DL (ref 3.5–5)
ALP SERPL-CCNC: 85 U/L (ref 46–116)
ALT SERPL W P-5'-P-CCNC: 16 U/L (ref 12–78)
ANION GAP SERPL CALCULATED.3IONS-SCNC: 6 MMOL/L (ref 4–13)
AST SERPL W P-5'-P-CCNC: 12 U/L (ref 5–45)
BASOPHILS # BLD AUTO: 0.05 THOUSANDS/ΜL (ref 0–0.1)
BASOPHILS NFR BLD AUTO: 1 % (ref 0–1)
BILIRUB SERPL-MCNC: 0.55 MG/DL (ref 0.2–1)
BUN SERPL-MCNC: 10 MG/DL (ref 5–25)
CALCIUM SERPL-MCNC: 9 MG/DL (ref 8.3–10.1)
CHLORIDE SERPL-SCNC: 110 MMOL/L (ref 100–108)
CO2 SERPL-SCNC: 24 MMOL/L (ref 21–32)
CREAT SERPL-MCNC: 0.55 MG/DL (ref 0.6–1.3)
EOSINOPHIL # BLD AUTO: 0.18 THOUSAND/ΜL (ref 0–0.61)
EOSINOPHIL NFR BLD AUTO: 2 % (ref 0–6)
ERYTHROCYTE [DISTWIDTH] IN BLOOD BY AUTOMATED COUNT: 13.2 % (ref 11.6–15.1)
GFR SERPL CREATININE-BSD FRML MDRD: 91 ML/MIN/1.73SQ M
GLUCOSE SERPL-MCNC: 84 MG/DL (ref 65–140)
GLUCOSE SERPL-MCNC: 89 MG/DL (ref 65–140)
HCT VFR BLD AUTO: 36.7 % (ref 34.8–46.1)
HGB BLD-MCNC: 11.9 G/DL (ref 11.5–15.4)
IMM GRANULOCYTES # BLD AUTO: 0.03 THOUSAND/UL (ref 0–0.2)
IMM GRANULOCYTES NFR BLD AUTO: 0 % (ref 0–2)
LIPASE SERPL-CCNC: 113 U/L (ref 73–393)
LYMPHOCYTES # BLD AUTO: 1.79 THOUSANDS/ΜL (ref 0.6–4.47)
LYMPHOCYTES NFR BLD AUTO: 24 % (ref 14–44)
MCH RBC QN AUTO: 29.3 PG (ref 26.8–34.3)
MCHC RBC AUTO-ENTMCNC: 32.4 G/DL (ref 31.4–37.4)
MCV RBC AUTO: 90 FL (ref 82–98)
MONOCYTES # BLD AUTO: 0.78 THOUSAND/ΜL (ref 0.17–1.22)
MONOCYTES NFR BLD AUTO: 10 % (ref 4–12)
NEUTROPHILS # BLD AUTO: 4.7 THOUSANDS/ΜL (ref 1.85–7.62)
NEUTS SEG NFR BLD AUTO: 63 % (ref 43–75)
NRBC BLD AUTO-RTO: 0 /100 WBCS
PLATELET # BLD AUTO: 287 THOUSANDS/UL (ref 149–390)
PMV BLD AUTO: 9.9 FL (ref 8.9–12.7)
POTASSIUM SERPL-SCNC: 3.7 MMOL/L (ref 3.5–5.3)
PROT SERPL-MCNC: 6.2 G/DL (ref 6.4–8.2)
RBC # BLD AUTO: 4.06 MILLION/UL (ref 3.81–5.12)
SODIUM SERPL-SCNC: 140 MMOL/L (ref 136–145)
TRIGL SERPL-MCNC: 70 MG/DL
WBC # BLD AUTO: 7.53 THOUSAND/UL (ref 4.31–10.16)

## 2019-10-19 PROCEDURE — 97166 OT EVAL MOD COMPLEX 45 MIN: CPT

## 2019-10-19 PROCEDURE — 84478 ASSAY OF TRIGLYCERIDES: CPT | Performed by: INTERNAL MEDICINE

## 2019-10-19 PROCEDURE — 97535 SELF CARE MNGMENT TRAINING: CPT

## 2019-10-19 PROCEDURE — 82948 REAGENT STRIP/BLOOD GLUCOSE: CPT

## 2019-10-19 PROCEDURE — 85025 COMPLETE CBC W/AUTO DIFF WBC: CPT | Performed by: INTERNAL MEDICINE

## 2019-10-19 PROCEDURE — 99232 SBSQ HOSP IP/OBS MODERATE 35: CPT | Performed by: SURGERY

## 2019-10-19 PROCEDURE — 99232 SBSQ HOSP IP/OBS MODERATE 35: CPT | Performed by: HOSPITALIST

## 2019-10-19 PROCEDURE — 80053 COMPREHEN METABOLIC PANEL: CPT | Performed by: INTERNAL MEDICINE

## 2019-10-19 PROCEDURE — 83690 ASSAY OF LIPASE: CPT | Performed by: INTERNAL MEDICINE

## 2019-10-19 RX ORDER — HEPARIN SODIUM 5000 [USP'U]/ML
5000 INJECTION, SOLUTION INTRAVENOUS; SUBCUTANEOUS EVERY 8 HOURS SCHEDULED
Status: DISCONTINUED | OUTPATIENT
Start: 2019-10-19 | End: 2019-10-20 | Stop reason: HOSPADM

## 2019-10-19 RX ORDER — ACETAMINOPHEN 325 MG/1
975 TABLET ORAL EVERY 8 HOURS SCHEDULED
Status: DISCONTINUED | OUTPATIENT
Start: 2019-10-19 | End: 2019-10-20 | Stop reason: HOSPADM

## 2019-10-19 RX ORDER — LISINOPRIL 5 MG/1
5 TABLET ORAL DAILY
Status: DISCONTINUED | OUTPATIENT
Start: 2019-10-19 | End: 2019-10-20 | Stop reason: HOSPADM

## 2019-10-19 RX ADMIN — HEPARIN SODIUM 5000 UNITS: 5000 INJECTION INTRAVENOUS; SUBCUTANEOUS at 18:35

## 2019-10-19 RX ADMIN — LISINOPRIL 5 MG: 5 TABLET ORAL at 18:39

## 2019-10-19 RX ADMIN — RIVASTIGMINE TARTRATE 4.5 MG: 1.5 CAPSULE ORAL at 10:18

## 2019-10-19 RX ADMIN — WHITE PETROLATUM 57.7 %-MINERAL OIL 31.9 % EYE OINTMENT: at 21:40

## 2019-10-19 RX ADMIN — DOCUSATE SODIUM 100 MG: 100 CAPSULE, LIQUID FILLED ORAL at 10:17

## 2019-10-19 RX ADMIN — ACETAMINOPHEN 975 MG: 325 TABLET ORAL at 18:38

## 2019-10-19 RX ADMIN — Medication 1 TABLET: at 10:17

## 2019-10-19 RX ADMIN — SODIUM CHLORIDE, SODIUM LACTATE, POTASSIUM CHLORIDE, AND CALCIUM CHLORIDE 100 ML/HR: .6; .31; .03; .02 INJECTION, SOLUTION INTRAVENOUS at 06:24

## 2019-10-19 RX ADMIN — CYANOCOBALAMIN TAB 500 MCG 1000 MCG: 500 TAB at 10:18

## 2019-10-19 NOTE — ASSESSMENT & PLAN NOTE
- XR imaging negative for acute osseous abnormalities  - has mid tenderness in groin - suspect soft tissues inflammation - no mass or gross abnormality on exam, apply ice  - fall precautions - PRN pain control, add tylenol 975 TID - initiate calcium/vitamin-D supplementation  - with associated history of dementia, avoid unnecessary sedating agents

## 2019-10-19 NOTE — OCCUPATIONAL THERAPY NOTE
Occupational Therapy Evaluation      Chandan Larson    10/19/2019    Principal Problem:    Acute pancreatitis  Active Problems:    Essential hypertension    Left hip pain status post fall    Frontotemporal dementia without behavioral disturbance      Past Medical History:   Diagnosis Date    [facility] ([facility] [address] [facility]     Diabetes mellitus ([facility] [address] [facility]     Expressive aphasia     Hyperlipidemia     Hypertension     Impaired fasting glucose     Osteoarthritis     Vitamin D deficiency        Past Surgical History:   Procedure Laterality Date    JOINT REPLACEMENT      right knee      10/19/19 1018   Note Type   Note type Eval/Treat   Pain Assessment   Pain Assessment Schroeder-Baker FACES   Schroeder-Baker FACES Pain Rating 6   Pain Type Acute pain   Pain Location   (c/o pain in L hip, knee and calf via pointing )   Pain Orientation Left   Pain Descriptors Aching   Pain Frequency Intermittent   Pain Onset Progressive  (progressive onse with movement )   Effect of Pain on Daily Activities   (limitations with ADL and mobiity participation )   Hospital Pain Intervention(s) Medication (See MAR); Repositioned; Ambulation/increased activity   Home Living   Type of Home Assisted living  (locked dementia unit)   Home Layout One level   Prior Function   Level of [place] Needs assistance with ADLs and functional mobility   Lives With Facility staff   Receives Help From Personal care attendant   Falls in the last 6 months 1 to 4  (per chart 3 falls in last week )   Lifestyle   Autonomy difficulty with expressive speech-lanugage skills, hx of dementia     Appears to be able to engage in most ADLs with little physical A , reported using a walker    Reciprocal Relationships , states 3 children    Psychosocial   Psychosocial (WDL) WDL  (pleasant and cooperative, behavior is appropriate )   Subjective   Subjective "Ouch"    ADL   Eating Assistance 5  Supervision/Setup   Grooming Assistance 4  Minimal Assistance   UB Bathing Assistance 4  Minimal Assistance   LB Bathing Assistance 2  Maximal Assistance   UB Dressing Assistance 2  Maximal Assistance   LB Dressing Assistance 2  Maximal Assistance   Toileting Assistance  2  Maximal Assistance  (+ incontinent of urine )   Bed Mobility   Rolling R 4  Minimal assistance   Rolling L 4  Minimal assistance   Supine to Sit 4  Minimal assistance   Transfers   Sit to Stand 4  Minimal assistance   Stand to Sit 4  Minimal assistance   Stand pivot 4  Minimal assistance   Toilet transfer 4  Minimal assistance   Functional Mobility   Functional Mobility 4  Minimal assistance  (hand hold A x 1 )   Additional Comments reports walking with walker PTA, however ? historian   Balance   Static Sitting Fair +   Dynamic Sitting Fair   Static Standing Fair   Dynamic Standing Fair -   Activity Tolerance   Activity Tolerance Patient tolerated treatment well   Medical Staff Made Aware   (OT spoke with )   RUE Assessment   RUE Assessment WFL   LUE Assessment   LUE Assessment WFL   Cognition   Overall Cognitive Status Impaired   Arousal/Participation Alert; Responsive   Attention Difficulty attending to directions   Orientation Level Oriented to person   Memory Decreased recall of biographical information;Decreased short term memory  (hx of dementia )   Following Commands Follows one step commands with increased time or repetition  (does well with gestures and simple verbal commands )   Comments difficulty with processing multiple step driections    Assessment   Limitation Decreased ADL status; Decreased endurance;Decreased self-care trans   Prognosis Good   Assessment Patient is a 67 y/o female admitted from secure dementia unit s/p multple falls  Upon hosptial admission, patient found to have acute abdominal tenderness r/o pancreatitis  Patient is alert and oriented to person only    OT notes patient to have difficulty with expressive communication skills consistent with hx of dementia however follows simple commands well and responds well to visual gestures  Patient currently presents with decreased standing balance and activitiy tolerance impacting occupational performance in areas of bathing, dressing, toileting and ADL transfers  From OT standpoint, patient will benefit form continued OT services to progress basic ADL skills to highest level of ADL ability with least amount of caregiver assistance  Goals   Long Term Goal #1 1  Patient will increase ADL transfers to SBA level with less than 25%cogntive support for task initiation, sequencing and safe completion 2  Patient will increase LB ADLs to Min A level with G standing balance; 3  Patient will increase standing tolerance to 10 min for ADL tasks in stance at sink with SBA 4  Patient will increase toileting to SBA level    Plan   Treatment Interventions ADL retraining;Functional transfer training; Endurance training;Cognitive reorientation; Activityengagement   Goal Expiration Date 10/26/19   OT Frequency 2-3x/wk   Additional Treatment Session   Start Time 0945   End Time 0279   Treatment Assessment S: "This hurts" O: OT engaged patient in simple grooming, UB bathing and dressing as well as toileting and LB bathing activities OOB this AM   Patient requires Min verbal cues and visual gestures to initate all ADL tasks but is able to complete simple activiites with little to no physical A for grooming and hygine  Patient required Min A with UB ADLs and Mod/Max A with LB, struggled with sit to stand from low toilet seat  Pt also required A x 1 with walking to bathroom, initially c/o L hip/leg pain however patient decreased with activity  O: Patient demonstrates difficulty with communication skills due to hx of dementia, however responds well to visual gestures to initiate familiar tasks; P: unsure of patients PLOF in facility prior to admission, recommend facility assess patient for appropriateness to return when medically cleared    Will continue to follow for OT as per goals established in eval      Recommendation   Recommendation PT consult   OT Discharge Recommendation 24 hour supervision/assist  (recommend facility assess apporpriatenes to return)   Barthel Index   Feeding 5   Bathing 0   Grooming Score 0   Dressing Score 0   Bladder Score 5   Bowels Score 0   Toilet Use Score 5   Transfers (Bed/Chair) Score 5   Mobility (Level Surface) Score 0   Stairs Score 0   Barthel Index Score 20   Brief Interview for Mental Status (BIMS)   Repetition of Three Words (First Attempt) 1   Temporial Orientation: Year No answer   Temporal Orientation: Month No answer   Temporal Orientation: Day No answer   Recall: "Sock" No answer   Recall: "Blue" No, could not recall   Recall: "Bed" No, could not recall   BIMS Summary Score 99   Kortney Raya, OT

## 2019-10-19 NOTE — PROGRESS NOTES
Progress Note - Julee Bass 1945, 66 y o  female MRN: [id number]    Unit/Bed#: [facility] 802-01 Encounter: [id number]    Primary Care Provider: Kev Chavez MD   Date and time admitted to hospital: 10/18/2019 10:52 AM        Frontotemporal dementia without behavioral disturbance  Assessment & Plan  - continue Exelon  - fall precautions implemented  - supportive care - outpatient follow-up    Left hip pain status post fall  Assessment & Plan  - XR imaging negative for acute osseous abnormalities  - has mid tenderness in groin - suspect soft tissues inflammation - no mass or gross abnormality on exam, apply ice  - fall precautions - PRN pain control, add tylenol 975 TID - initiate calcium/vitamin-D supplementation  - with associated history of dementia, avoid unnecessary sedating agents    Essential hypertension  Assessment & Plan  - low-sodium restriction once on oral diet  - restart lisinopril    * Acute pancreatitis  Assessment & Plan  - presents with a lipase of 1,604  - per CT imaging, "Mild prominence of pancreatic duct without any obvious underlying pancreatic lesion  This could be correlated with pancreatic enzymes  Hyperenhancing lesion within the liver could represent a hemangioma but should be correlated with any history of chronic liver pathology    Interval follow-up elective MRI suggested "  - appreciate general surgery input - await RUQ ultrasound - LFTs otherwise unremarkable  - repeat lipase is normal, has no GI symptoms  - place on regular diet now, f/u US results  - triglyceride level  normal  - ok to restart Lisinopril        [facility]'s Internal Medicine Progress Note  Patient: Juele Bass 66 y o  female   MRN: [id number]  PCP: Kev Chavez MD  Unit/Bed#: [facility] 802-01 Encounter: [id number]  Date Of Visit: 10/19/19    Assessment:    Principal Problem:    Acute pancreatitis  Active Problems:    Essential hypertension    Left hip pain status post fall    Frontotemporal dementia without behavioral disturbance        VTE Pharmacologic Prophylaxis:   Pharmacologic: Heparin  Mechanical VTE Prophylaxis in Place: Yes    Patient Centered Rounds: I have performed bedside rounds with nursing staff today  Discussions with Specialists or Other Care Team Provider:     Education and Discussions with Family / Patient: freidather    Time Spent for Care: 30 minutes  More than 50% of total time spent on counseling and coordination of care as described above  Current Length of Stay: 1 day(s)    Current Patient Status: Inpatient   Certification Statement: The patient will continue to require additional inpatient hospital stay due to monitor tolerance to PO, PT eval    Discharge Plan / Estimated Discharge Date: 24 hrs    Code Status: Level 1 - Full Code      Subjective:   Denies abdo pain/n/v, upon exam has some tenderness in left groin    Objective:     Vitals:   Temp (24hrs), Av 6 °F (37 °C), Min:98 1 °F (36 7 °C), Max:99 5 °F (37 5 °C)    Temp:  [98 1 °F (36 7 °C)-99 5 °F (37 5 °C)] 98 1 °F (36 7 °C)  HR:  [77-85] 80  Resp:  [15-18] 15  BP: (119-165)/(58-93) 165/93  SpO2:  [97 %-100 %] 100 %  Body mass index is 24 24 kg/m²  Input and Output Summary (last 24 hours): Intake/Output Summary (Last 24 hours) at 10/19/2019 1531  Last data filed at 10/19/2019 1300  Gross per 24 hour   Intake 691 67 ml   Output 1080 ml   Net -388  33 ml       Physical Exam:     Physical Exam   Constitutional: She appears well-developed and well-nourished  HENT:   Head: Normocephalic and atraumatic  Eyes: Conjunctivae are normal    Cardiovascular: Normal rate and regular rhythm  Exam reveals no friction rub  No murmur heard  Pulmonary/Chest: Effort normal and breath sounds normal  No stridor  No respiratory distress  Abdominal: Soft  She exhibits no distension  There is no tenderness  Neurological: She is alert  Vitals reviewed        Additional Data:     Labs:    Results from last 7 days   Lab Units 10/19/19  0534   WBC Thousand/uL 7 53   HEMOGLOBIN g/dL 11 9   HEMATOCRIT % 36 7   PLATELETS Thousands/uL 287   NEUTROS PCT % 63   LYMPHS PCT % 24   MONOS PCT % 10   EOS PCT % 2     Results from last 7 days   Lab Units 10/19/19  0534   POTASSIUM mmol/L 3 7   CHLORIDE mmol/L 110*   CO2 mmol/L 24   BUN mg/dL 10   CREATININE mg/dL 0 55*   CALCIUM mg/dL 9 0   ALK PHOS U/L 85   ALT U/L 16   AST U/L 12           * I Have Reviewed All Lab Data Listed Above  * Additional Pertinent Lab Tests Reviewed: All Labs Within Last 24 Hours Reviewed    Imaging:    Imaging Reports Reviewed Today Include:   Imaging Personally Reviewed by Myself Includes:      Recent Cultures (last 7 days):           Last 24 Hours Medication List:     Current Facility-Administered Medications:  acetaminophen 975 mg Oral Q8H Mingo Perez MD   artificial tear  Both Eyes HS Archie Rahman, MD   calcium carbonate-vitamin D 1 tablet Oral Daily With Breakfast Archie Rahman, MD   cyanocobalamin 1,000 mcg Oral Daily Archie Blood, MD   docusate sodium 100 mg Oral Daily Archie Blood, MD   hydrALAZINE 5 mg Intravenous Q6H PRN Oliviale Blood, MD   lisinopril 5 mg Oral Daily Devan Dasilva MD   ondansetron 4 mg Intravenous Q4H PRN Archie Blood, MD   rivastigmine 4 5 mg Oral Daily Archie Blood, MD        Today, Patient Was Seen By: Devan Dasilva MD    ** Please Note: This note has been constructed using a voice recognition system   **

## 2019-10-19 NOTE — PLAN OF CARE
Problem: OCCUPATIONAL THERAPY ADULT  Goal: Performs self-care activities at highest level of function for planned discharge setting  See evaluation for individualized goals  Description  Treatment Interventions: ADL retraining, Functional transfer training, Endurance training, Cognitive reorientation, Activityengagement          See flowsheet documentation for full assessment, interventions and recommendations  Note:   Limitation: Decreased ADL status, Decreased endurance, Decreased self-care trans  Prognosis: Good  Assessment: Patient is a 65 y/o female admitted from secure dementia unit s/p multple falls  Upon hosptial admission, patient found to have acute abdominal tenderness r/o pancreatitis  Patient is alert and oriented to person only  OT notes patient to have difficulty with expressive communication skills consistent with hx of dementia however follows simple commands well and responds well to visual gestures  Patient currently presents with decreased standing balance and activitiy tolerance impacting occupational performance in areas of bathing, dressing, toileting and ADL transfers  From OT standpoint, patient will benefit form continued OT services to progress basic ADL skills to highest level of ADL ability with least amount of caregiver assistance      Recommendation: PT consult  OT Discharge Recommendation: 24 hour supervision/assist(recommend facility assess apporpriatenes to return)

## 2019-10-19 NOTE — ASSESSMENT & PLAN NOTE
- presents with a lipase of 1,604  - per CT imaging, "Mild prominence of pancreatic duct without any obvious underlying pancreatic lesion  This could be correlated with pancreatic enzymes  Hyperenhancing lesion within the liver could represent a hemangioma but should be correlated with any history of chronic liver pathology    Interval follow-up elective MRI suggested "  - appreciate general surgery input - await RUQ ultrasound - LFTs otherwise unremarkable  - repeat lipase is normal, has no GI symptoms  - place on regular diet now, f/u US results  - triglyceride level  normal  - ok to restart Lisinopril

## 2019-10-19 NOTE — PLAN OF CARE
Problem: Prexisting or High Potential for Compromised Skin Integrity  Goal: Skin integrity is maintained or improved  Description  INTERVENTIONS:  - Identify patients at risk for skin breakdown  - Assess and monitor skin integrity  - Assess and monitor nutrition and hydration status  - Monitor labs   - Assess for incontinence   - Turn and reposition patient  - Assist with mobility/ambulation  - Relieve pressure over bony prominences  - Avoid friction and shearing  - Provide appropriate hygiene as needed including keeping skin clean and dry  - Evaluate need for skin moisturizer/barrier cream  - Collaborate with interdisciplinary team   - Patient/family teaching  - Consider wound care consult   Outcome: Progressing     Problem: Potential for Falls  Goal: Patient will remain free of falls  Description  INTERVENTIONS:  - Assess patient frequently for physical needs  -  Identify cognitive and physical deficits and behaviors that affect risk of falls    -  [place] fall precautions as indicated by assessment   - Educate patient/family on patient safety including physical limitations  - Instruct patient to call for assistance with activity based on assessment  - Modify environment to reduce risk of injury  - Consider OT/PT consult to assist with strengthening/mobility  Outcome: Progressing

## 2019-10-19 NOTE — PROGRESS NOTES
Progress Note - General Surgery   Corie Eastman 66 y o  female MRN: [id number]  Unit/Bed#: [facility] 802-01 Encounter: [id number]    Assessment:  74yF w/ history of falls, presenting with hip pain 3d following unwittnessed fall in dementia unit, and new b/l ankle swelling  Found here to have intermittent abdominal tenderness to palpation and lipase 1600     - Non-tender this AM  - US read not back but has gallstones on imaging    Plan:  F/u AM lipase  Cont IVF  Diet per primary - ok to advance from our perspective  Will follow up AM lipase and official US read  Do not feel strongly that she truly has biliary pancreatitis and would warrant a cholecystectomy  Subjective/Objective   Subjective:   No abdominal pain  Resting comfortably  Objective:    Blood pressure 124/58, pulse 77, temperature 98 1 °F (36 7 °C), resp  rate 16, height 4' 11" (1 499 m), weight 54 4 kg (120 lb), SpO2 97 %, not currently breastfeeding  ,Body mass index is 24 24 kg/m²  Intake/Output Summary (Last 24 hours) at 10/19/2019 0635  Last data filed at 10/19/2019 0501  Gross per 24 hour   Intake 691 67 ml   Output 1380 ml   Net -688  33 ml       Invasive Devices     Peripheral Intravenous Line            Peripheral IV 07/23/19 Dorsal (posterior); Right Forearm 88 days    Peripheral IV 10/18/19 Right Antecubital less than 1 day                Physical Exam:     Gen: NAD, Awake and alert  Dementia  Pleasant  CV: RRR  Pulm: no resp distress  Abd: Soft, non-distended, non-tender      Lab, Imaging and other studies:  I have personally reviewed pertinent lab results    , CBC:   Lab Results   Component Value Date    WBC 7 53 10/19/2019    HGB 11 9 10/19/2019    HCT 36 7 10/19/2019    MCV 90 10/19/2019     10/19/2019    MCH 29 3 10/19/2019    MCHC 32 4 10/19/2019    RDW 13 2 10/19/2019    MPV 9 9 10/19/2019    NRBC 0 10/19/2019   , CMP:   Lab Results   Component Value Date    SODIUM 142 10/18/2019    K 3 7 10/18/2019     10/18/2019    CO2 26 10/18/2019    BUN 13 10/18/2019    CREATININE 0 74 10/18/2019    CALCIUM 9 2 10/18/2019    AST 14 10/18/2019    ALT 18 10/18/2019    ALKPHOS 81 10/18/2019    EGFR 79 10/18/2019   , Coagulation: No results found for: PT, INR, APTT, Lipase:   Lab Results   Component Value Date    LIPASE 1,604 (H) 10/18/2019     VTE Pharmacologic Prophylaxis: Sequential compression device (Venodyne)   VTE Mechanical Prophylaxis: sequential compression device

## 2019-10-20 VITALS
HEART RATE: 76 BPM | OXYGEN SATURATION: 98 % | WEIGHT: 120 LBS | BODY MASS INDEX: 24.19 KG/M2 | SYSTOLIC BLOOD PRESSURE: 152 MMHG | TEMPERATURE: 98.1 F | RESPIRATION RATE: 18 BRPM | HEIGHT: 59 IN | DIASTOLIC BLOOD PRESSURE: 72 MMHG

## 2019-10-20 PROBLEM — K85.90 ACUTE PANCREATITIS: Status: RESOLVED | Noted: 2019-10-18 | Resolved: 2019-10-20

## 2019-10-20 LAB — MRSA NOSE QL CULT: NORMAL

## 2019-10-20 PROCEDURE — G8979 MOBILITY GOAL STATUS: HCPCS

## 2019-10-20 PROCEDURE — G8978 MOBILITY CURRENT STATUS: HCPCS

## 2019-10-20 PROCEDURE — 99239 HOSP IP/OBS DSCHRG MGMT >30: CPT | Performed by: PHYSICIAN ASSISTANT

## 2019-10-20 PROCEDURE — 97163 PT EVAL HIGH COMPLEX 45 MIN: CPT

## 2019-10-20 RX ORDER — ACETAMINOPHEN 325 MG/1
975 TABLET ORAL EVERY 8 HOURS SCHEDULED
Qty: 27 TABLET | Refills: 0
Start: 2019-10-20 | End: 2019-10-23

## 2019-10-20 RX ADMIN — ACETAMINOPHEN 975 MG: 325 TABLET ORAL at 05:18

## 2019-10-20 RX ADMIN — RIVASTIGMINE TARTRATE 4.5 MG: 1.5 CAPSULE ORAL at 09:24

## 2019-10-20 RX ADMIN — DOCUSATE SODIUM 100 MG: 100 CAPSULE, LIQUID FILLED ORAL at 09:23

## 2019-10-20 RX ADMIN — HEPARIN SODIUM 5000 UNITS: 5000 INJECTION INTRAVENOUS; SUBCUTANEOUS at 05:17

## 2019-10-20 RX ADMIN — ACETAMINOPHEN 975 MG: 325 TABLET ORAL at 15:27

## 2019-10-20 RX ADMIN — CYANOCOBALAMIN TAB 500 MCG 1000 MCG: 500 TAB at 09:22

## 2019-10-20 RX ADMIN — Medication 1 TABLET: at 09:22

## 2019-10-20 NOTE — DISCHARGE SUMMARY
Discharge- Cait Petties 1945, 66 y o  female MRN: [id number]  Unit/Bed#: [facility] 802-01 Encounter: [id number]  Primary Care Provider: Karina Overton MD   Date and time admitted to hospital: 10/18/2019 10:52 AM    * Acute pancreatitis-resolved as of 10/20/2019  Assessment & Plan  · Presents with vague complaints of abdominal pain, abnormal CT abdomen and elevated lipase of 1,604  · Lipase has normalized  · CT abdomen/pelvis:  Mild fecal stasis  Mild prominence of pancreatic duct without any obvious underlying pancreatic lesion  Hyper enhancing lesion within the liver could represent hemangioma  · RUQ ultrasound:  Echogenic lesion noted on CT scan is not visible on ultrasound  Gallbladder is normal in caliber with no wall thickening or jessie cholecystic fluid  There is an isolated shadowing focus within the gallbladder lumen which may represent a gallstone but no sonographic Mario Latoya sign is noted  Common bile duct is normal   · Surgery has evaluated the patient and at this time, there is no surgical intervention recommended  · Triglycerides are normal   · Diet as tolerated  Frontotemporal dementia without behavioral disturbance  Assessment & Plan  · Patient currently resides in locked dementia unit  · OT evaluation noted  · Continue Exelon    Left hip pain status post fall  Assessment & Plan  · Patient has sustained multiple falls recently  · Left hip/pelvis xray demonstrates no acute osseous abnormalities  · Conservative management with Tylenol, ice  · PT evaluation: OK to return to [facility] with PT evaluation      Essential hypertension  Assessment & Plan  · Well controlled on lisinopril        Discharging Physician / Practitioner: Jimena Singleton PA-C  PCP: Karina Overton MD  Admission Date:   Admission Orders (From admission, onward)     Ordered        10/18/19 1805  Inpatient Admission  Once                   Discharge Date: 10/20/19    Disposition:      Other: [facility]    For Discharges to [facility]:   · Not Applicable to this Patient - Not Applicable to this Patient    Reason for Admission: Fall and left hip pain    Discharge Diagnoses:     Please see assessment and plan section above for further details regarding discharge diagnoses  Resolved Problems  Date Reviewed: 10/20/2019          Resolved    * (Principal) Acute pancreatitis 10/20/2019     Resolved by  Joel Chris PA-C        Consultations During Hospital Stay:  · Surgery    Procedures Performed:   · Left hip pelvis x-ray (10/18/2019):  No acute osseous abnormality  · CT abdomen pelvis (10/18/2019):  Evidence of mild fecal stasis  Mild prominence of pancreatic duct without any obvious underlying pancreatic lesion  Hyper enhancing lesion within the liver could represent a hemangioma  · Right upper quadrant ultrasound (10/18/2019):  Probable gallstone  Common bile duct is at upper limits of normal   Echogenic liver lesion and pancreatic duct prominence is not well appreciated  Medication Adjustments and Discharge Medications:  · Summary of Medication Adjustments made as a result of this hospitalization:  None  · Medication Dosing Tapers - Please refer to Discharge Medication List for details on any medication dosing tapers (if applicable to patient)  · Medications being temporarily held (include recommended restart time):  None  · Discharge Medication List: See after visit summary for reconciled discharge medications  Wound Care Recommendations:  When applicable, please see wound care section of After Visit Summary  Diet Recommendations at Discharge:   Diet -        Diet Orders   (From admission, onward)             Start     Ordered    10/19/19 1251  Diet Regular; Regular House  Diet effective now     Question Answer Comment   Diet Type Regular    Regular Regular House    RD to adjust diet per protocol?  Yes        10/19/19 1250              Fluid Restriction - No Fluid Restriction at Discharge  Instructions for any Catheters / Lines Present at Discharge (including removal date, if applicable): n/a    Significant Findings / Test Results:   · See above    Incidental Findings:   · None     Test Results Pending at Discharge (will require follow up): · None     Outpatient Tests Requested:  · None    Complications:  None    Hospital Course:     Virgilio Maxwell is a 66 y o  female patient who originally presented to the hospital on 10/18/2019 due to hip pain status post fall  Patient resides at [facility] in [facility] in a locked dementia unit  Several days previous to her admission she had an unwitnessed fall and hence been complaining of some left-sided hip pain since that time  Fortunately, diagnostic imaging did not demonstrate any evidence of fracture  She had routine lab work performed in the ED which demonstrated an elevated lipase level and for this reason she underwent CT scan of the abdomen and pelvis  Therefore, she was admitted for acute pancreatitis  She was seen in consultation by surgery and underwent right upper quadrant ultrasound  Her lipase normalized quite quickly and she was able to tolerate p o  Intake  Surgery was not recommended  The patient may have passed a small stone  She was seen in consultation by PT who recommended return to previous living environment with PT evaluation  She does have some pain along the iliac crest on the left side but again no evidence of fracture  This should be treated conservatively with Tylenol and ice      Condition at Discharge: stable     Discharge Day Visit / Exam:     * Please refer to separate progress note for these details *    Goals of Care Discussions:  · Code Status at Discharge: Level 1 - Full Code  · Were there any Goals of Care Discussions during Hospitalization?: No  · Results of any General Goals of Care Discussions: n/a   · POLST Completed: No   · If POLST Completed, Summary of POLST Agreement Provided Here: n/a   · OK to Rehospitalize if Needed? Yes    Discharge instructions/Information to patient and family:   See after visit summary section titled Discharge Instructions for information provided to patient and family  Planned Readmission:  No      Discharge Statement:  I spent 45 minutes discharging the patient  This time was spent on the day of discharge  I had direct contact with the patient on the day of discharge  Greater than 50% of the total time was spent examining patient, answering all patient questions, arranging and discussing plan of care with patient as well as directly providing post-discharge instructions  Additional time then spent on discharge activities      ** Please Note: This note has been constructed using a voice recognition system **

## 2019-10-20 NOTE — ASSESSMENT & PLAN NOTE
· Patient has sustained multiple falls recently  · Left hip/pelvis xray demonstrates no acute osseous abnormalities  · Conservative management with Tylenol, ice, Lidoderm patch  · PT evaluation: pending to determine if patient can return to previous living environment

## 2019-10-20 NOTE — ASSESSMENT & PLAN NOTE
· Presents with vague complaints of abdominal pain, abnormal CT abdomen and elevated lipase of 1,604  · Lipase has normalized  · CT abdomen/pelvis:  Mild fecal stasis  Mild prominence of pancreatic duct without any obvious underlying pancreatic lesion  Hyper enhancing lesion within the liver could represent hemangioma  · RUQ ultrasound:  Echogenic lesion noted on CT scan is not visible on ultrasound  Gallbladder is normal in caliber with no wall thickening or jessie cholecystic fluid  There is an isolated shadowing focus within the gallbladder lumen which may represent a gallstone but no sonographic Banerjee Matte sign is noted  Common bile duct is normal   · Surgery has evaluated the patient and at this time, there is no surgical intervention recommended  · Triglycerides are normal   · Diet as tolerated

## 2019-10-20 NOTE — PROGRESS NOTES
Progress Note - Sumeet Pineda 1945, 66 y o  female MRN: [id number]  Unit/Bed#: [facility] 802-01 Encounter: [id number]  Primary Care Provider: Nakul Orona MD   Date and time admitted to hospital: 10/18/2019 10:52 AM    * Acute pancreatitis  Assessment & Plan  · Presents with vague complaints of abdominal pain, abnormal CT abdomen and elevated lipase of 1,604  · Lipase has normalized  · CT abdomen/pelvis:  Mild fecal stasis  Mild prominence of pancreatic duct without any obvious underlying pancreatic lesion  Hyper enhancing lesion within the liver could represent hemangioma  · RUQ ultrasound:  Echogenic lesion noted on CT scan is not visible on ultrasound  Gallbladder is normal in caliber with no wall thickening or jessie cholecystic fluid  There is an isolated shadowing focus within the gallbladder lumen which may represent a gallstone but no sonographic Donneta Irma sign is noted  Common bile duct is normal   · Surgery has evaluated the patient and at this time, there is no surgical intervention recommended  · Triglycerides are normal   · Diet as tolerated  Frontotemporal dementia without behavioral disturbance  Assessment & Plan  · Patient currently resides in locked dementia unit  · OT evaluation noted  · Continue Exelon    Left hip pain status post fall  Assessment & Plan  · Patient has sustained multiple falls recently  · Left hip/pelvis xray demonstrates no acute osseous abnormalities  · Conservative management with Tylenol, ice, Lidoderm patch  · PT evaluation: pending to determine if patient can return to previous living environment  Essential hypertension  Assessment & Plan  · Well controlled on lisinopril    VTE Pharmacologic Prophylaxis:   Pharmacologic: Heparin  Mechanical: Mechanical VTE prophylaxis in place  Patient Centered Rounds: I have performed bedside rounds with nursing staff today    Discussions with Specialists or Other Care Team Provider: Case management  Education and Discussions with Family / Patient: Unsuccessful attempt to reach patient's family  Time Spent for Care: 20 minutes  More than 50% of total time spent on counseling and coordination of care as described above  Current Length of Stay: 2 day(s)  Current Patient Status: Inpatient   Certification Statement: The patient will continue to require additional inpatient hospital stay due to pending PT evaluation to determine if patient can return to previous living environment  Discharge Plan: Patient is medically stable for discharge; however, waiting on PT evaluation  Code Status: Level 1 - Full Code    Subjective:   Patient is unreliable historian given underlying dementia  Per nursing there were no overnight events  Patient denies any pain complaints however when I press on her left iliac crest she states Ooh, there" and appears uncomfortable  Objective:   Vitals:   Temp (24hrs), Av 5 °F (36 9 °C), Min:98 °F (36 7 °C), Max:99 °F (37 2 °C)    Temp:  [98 °F (36 7 °C)-99 °F (37 2 °C)] 98 °F (36 7 °C)  HR:  [78-86] 78  Resp:  [16-18] 18  BP: (106-144)/(56-71) 106/56  SpO2:  [93 %-99 %] 99 %  Body mass index is 24 24 kg/m²  Input and Output Summary (last 24 hours): Intake/Output Summary (Last 24 hours) at 10/20/2019 1010  Last data filed at 10/20/2019 0509  Gross per 24 hour   Intake 0 ml   Output 650 ml   Net -650 ml       Physical Exam:     Physical Exam   HENT:   Head: Normocephalic and atraumatic  Mouth/Throat: Oropharynx is clear and moist and mucous membranes are normal    Eyes: No scleral icterus  Cardiovascular: Normal rate and regular rhythm  No murmur heard  Pulmonary/Chest: Breath sounds normal  She has no wheezes  She has no rales  She exhibits no tenderness  Abdominal: Soft  Bowel sounds are normal  She exhibits no distension  There is no tenderness  Musculoskeletal: Normal range of motion  She exhibits no edema     Tenderness along the left iliac crest   No evidence of ecchymosis or trauma  Skin: Skin is warm and dry  No rash noted  Psychiatric: She has a normal mood and affect  Cognition and memory are impaired  Vitals reviewed  Additional Data:   Labs:  Results from last 7 days   Lab Units 10/19/19  0534   WBC Thousand/uL 7 53   HEMOGLOBIN g/dL 11 9   HEMATOCRIT % 36 7   PLATELETS Thousands/uL 287   NEUTROS PCT % 63   LYMPHS PCT % 24   MONOS PCT % 10   EOS PCT % 2     Results from last 7 days   Lab Units 10/19/19  0534   POTASSIUM mmol/L 3 7   CHLORIDE mmol/L 110*   CO2 mmol/L 24   BUN mg/dL 10   CREATININE mg/dL 0 55*   CALCIUM mg/dL 9 0   ALK PHOS U/L 85   ALT U/L 16   AST U/L 12           * I Have Reviewed All Lab Data Listed Above  * Additional Pertinent Lab Tests Reviewed:  All Labs Within Last 24 Hours Reviewed    Imaging:    Imaging Reports Reviewed Today Include: None new    Cultures:   Blood Culture: No results found for: BLOODCX  Urine Culture:   Lab Results   Component Value Date    URINECX 60,000-69,000 cfu/ml Escherichia coli (A) 04/24/2019    URINECX 2445-2125 cfu/ml  04/24/2019     Sputum Culture: No components found for: SPUTUMCX  Wound Culture: No results found for: WOUNDCULT    Last 24 Hours Medication List:     Current Facility-Administered Medications:  acetaminophen 975 mg Oral Q8H [address] Yecenia Odell MD   artificial tear  Both Eyes HS Brigitte Block MD   calcium carbonate-vitamin D 1 tablet Oral Daily With Breakfast Brigitte Block MD   cyanocobalamin 1,000 mcg Oral Daily Brigitte Block MD   docusate sodium 100 mg Oral Daily Brigitte Block MD   heparin (porcine) 5,000 Units Subcutaneous Q8H [address] Autumn Mahoney MD   hydrALAZINE 5 mg Intravenous Q6H PRN Brigitte Block MD   lisinopril 5 mg Oral Daily Autumn Mahoney MD   ondansetron 4 mg Intravenous Q4H PRN Brigitte Block MD   rivastigmine 4 5 mg Oral Daily Brigitte Block MD        Today, Patient Was Seen By: Wilbur Mohr PA-C    ** Please Note: Dragon 360 Dictation voice to text software may have been used in the creation of this document   **

## 2019-10-20 NOTE — UTILIZATION REVIEW
Initial Clinical Review    Admission: Date/Time/Statement: Inpatient Admission Orders (From admission, onward)     Ordered        10/18/19 1805  Inpatient Admission  Once                   Orders Placed This Encounter   Procedures    Inpatient Admission     Standing Status:   Standing     Number of Occurrences:   1     Order Specific Question:   Admitting Physician     Answer:   Deanna Robert [[id number]]     Order Specific Question:   Level of Care     Answer:   Med Surg [[id number]]     Order Specific Question:   Estimated length of stay     Answer:   More than 2 Midnights     Order Specific Question:   Certification     Answer:   I certify that inpatient services are medically necessary for this patient for a duration of greater than two midnights  See H&P and MD Progress Notes for additional information about the patient's course of treatment  ED Arrival Information     Expected Arrival Acuity Means of Arrival Escorted By Service Admission Type    - 10/18/2019 10:52 Urgent Ambulance [facility] Hospitalist Urgent    Arrival Complaint    fall        Chief Complaint   Patient presents with    Hip Pain     Patient presents to the E   with left hip pain  Patient resides at [facility] Dementia unit and comes to us with a report of the patient having an unwitnessed fall 3 days ago     Assessment/Plan: 8 y/o female presents to ED from nursing home after sustaining an unwitnessed fall a few days days  Over the past day, pt been c/o worsening L hip pain  In ED imaging was neg for fx  Lab work revealed elevated lipase, CT a/p imaging revealed suspicion for possible pancreatitis  Currently c/o L hip and abdominal pain  Admit inpatient to M/S unit with Acute Pancreatitis / L hip pain s/p fall  Npo, IVF, analgesics prn, aurgery consult, RUQ US, serum triglyceride level, d/c lisinopril, hydralazine IV prn, PT/OT evals    10/19 -- abd pain improved, cont IVF, advance diet as mellisa   US GB prelim (+) gallstones      ED Triage Vitals   Temperature Pulse Respirations Blood Pressure SpO2   10/18/19 1056 10/18/19 1055 10/18/19 1055 10/18/19 1055 10/18/19 1055   98 4 °F (36 9 °C) 88 18 166/74 97 %      Temp Source Heart Rate Source Patient Position - Orthostatic VS BP Location FiO2 (%)   10/18/19 1056 10/18/19 1149 10/18/19 1055 10/18/19 1055 --   Oral Monitor Lying Right arm       Pain Score       10/18/19 1055       Worst Possible Pain        Wt Readings from Last 1 Encounters:   10/18/19 54 4 kg (120 lb)     Additional Vital Signs:   Date/Time  Temp  Pulse  Resp  BP  MAP (mmHg)  SpO2  O2 Device   10/20/19 06:54:24  98 °F (36 7 °C)  78  18  106/56  73  99 %  --   10/18/19 23:07:14  98 1 °F (36 7 °C)  77  16  124/58  80  97 %  --   10/18/19 19:13:50  99 5 °F (37 5 °C)  85  17  119/73  88  97 %  None (Room air)   10/18/19 1730  --  79  --  128/60  --  98 %  None (Room air)   10/18/19 1403  --  88  18  175/77Abnormal   --  98 %  None (Room air)   10/18/19 1300  --  80  18  191/88Abnormal   --  99 %  None (Room air)   10/18/19 1245  --  90  --  --  --  98 %  --   10/18/19 1200  --  86  18  189/79Abnormal   --  99 %  None (Room air)   10/18/19 1149  --  84  18  180/86Abnormal   --  99 %  None (Room air)   10/18/19 1056  98 4 °F (36 9 °C)  --  --  --  --  --  --   10/18/19 1055  --  88  18  166/74  --  97 %  None (Room air)       Pertinent Labs/Diagnostic Test Results:   EKG 10/18 --   Normal sinus rhythm  Possible Left atrial enlargement  Abnormal ECG  When compared with ECG of 24-APR-2019 17:46    XR left hip 10/18 -- no acute abnl    CT a/p 10/18 -- Evidence of mild fecal stasis more so in the rectum  Mild prominence of pancreatic duct without any obvious underlying pancreatic lesion  This could be correlated with pancreatic enzymes  Hyperenhancing lesion within the liver could represent a hemangioma but should be correlated with any history of chronic liver pathology    Interval follow-up elective MRI suggested  US GB 10/20 -- Probable gallstone  The common duct is at the upper limits of normal in size  Echogenic liver lesion and pancreatic duct prominence is not as well appreciated on this study  MRI/MRCP would still be useful      Results from last 7 days   Lab Units 10/19/19  0534 10/18/19  1251   WBC Thousand/uL 7 53 12 74*   HEMOGLOBIN g/dL 11 9 12 9   HEMATOCRIT % 36 7 39 6   PLATELETS Thousands/uL 287 321   NEUTROS ABS Thousands/µL 4 70 9 89*     Results from last 7 days   Lab Units 10/19/19  0534 10/18/19  1250   SODIUM mmol/L 140 142   POTASSIUM mmol/L 3 7 3 7   CHLORIDE mmol/L 110* 107   CO2 mmol/L 24 26   ANION GAP mmol/L 6 9   BUN mg/dL 10 13   CREATININE mg/dL 0 55* 0 74   EGFR ml/min/1 73sq m 91 79   CALCIUM mg/dL 9 0 9 2     Results from last 7 days   Lab Units 10/19/19  0534 10/18/19  1250   AST U/L 12 14   ALT U/L 16 18   ALK PHOS U/L 85 81   TOTAL PROTEIN g/dL 6 2* 7 3   ALBUMIN g/dL 2 8* 3 8   TOTAL BILIRUBIN mg/dL 0 55 0 51     Results from last 7 days   Lab Units 10/19/19  1231 10/18/19  1605   POC GLUCOSE mg/dl 89 91     Results from last 7 days   Lab Units 10/19/19  0534 10/18/19  1250   GLUCOSE RANDOM mg/dL 84 93     Results from last 7 days   Lab Units 10/19/19  0534 10/18/19  1250   LIPASE u/L 113 1,604*     Results from last 7 days   Lab Units 10/18/19  1407 10/18/19  1405   CLARITY UA  Clear clear   COLOR UA  Yellow yellow   SPEC GRAV UA  1 020  --    PH UA  7 0  --    GLUCOSE UA mg/dl Negative  --    KETONES UA mg/dl Negative  --    BLOOD UA  Negative  --    PROTEIN UA mg/dl Negative  --    NITRITE UA  Negative  --    BILIRUBIN UA  Negative  --    UROBILINOGEN UA E U /dl 0 2  --    LEUKOCYTES UA  Negative  --      ED Treatment:   Medication Administration from 10/18/2019 1052 to 10/18/2019 1908       Date/Time Order Dose Route Action     10/18/2019 1540 sodium chloride 0 9 % bolus 500 mL 500 mL Intravenous New Bag     Past Medical History:   Diagnosis Date    Dementia ([address] )     Diabetes mellitus [facility] [address] )     Expressive aphasia     Hyperlipidemia     Hypertension     Impaired fasting glucose     Osteoarthritis     Vitamin D deficiency      Present on Admission:   Essential hypertension      Admitting Diagnosis: Abnormal serum level of lipase [R74 8]  Abdominal pain [R10 9]  Unspecified multiple injuries, initial encounter [T07  XXXA]  Age/Sex: 66 y o  female  Admission Orders:  Medications:  acetaminophen 975 mg Oral Q8H [address]   artificial tear  Both Eyes HS   calcium carbonate-vitamin D 1 tablet Oral Daily With Breakfast   cyanocobalamin 1,000 mcg Oral Daily   docusate sodium 100 mg Oral Daily   heparin (porcine) 5,000 Units Subcutaneous Q8H [address]   lisinopril 5 mg Oral Daily   rivastigmine 4 5 mg Oral Daily          hydrALAZINE 5 mg Intravenous Q6H PRN   ondansetron 4 mg Intravenous Q4H PRN       Network Utilization Review Department  Phone: [phone]; Fax [phone]  [email or web address]  ATTENTION: Please call with any questions or concerns to [phone]  and carefully listen to the prompts so that you are directed to the right person  Send all requests for admission clinical reviews, approved or denied determinations and any other requests to fax [phone]   All voicemails are confidential

## 2019-10-20 NOTE — ASSESSMENT & PLAN NOTE
· Patient has sustained multiple falls recently  · Left hip/pelvis xray demonstrates no acute osseous abnormalities  · Conservative management with Tylenol, ice  · PT evaluation: OK to return to [facility] with PT evaluation

## 2019-10-20 NOTE — PHYSICAL THERAPY NOTE
PHYSICAL THERAPY Evaluation NOTE    Patient Name: Onel Lopez  FIWFH'V Date: 10/20/2019     AGE:   66 y o  Mrn:   [id number]  ADMIT DX:  Abnormal serum level of lipase [R74 8]  Abdominal pain [R10 9]  Unspecified multiple injuries, initial encounter [T07  XXXA]    Past Medical History:   Diagnosis Date    [facility] ([facility] [address] [facility] )     [facility] ([facility] )     Expressive aphasia     Hyperlipidemia     Hypertension     Impaired fasting glucose     Osteoarthritis     Vitamin D deficiency      Length Of Stay: 2  PHYSICAL THERAPY EVALUATION :    10/20/19 1253   Note Type   Note type Eval only   Pain Assessment   Pain Assessment 0-10   Pain Score No Pain   Home Living   Type of Home Assisted living  (locked dementia unit)   Home Layout One level   Home Equipment Walker   Additional Comments Pt  lives in locked dementia unit   Prior Function   Level of [place]Brien Needs assistance with IADLs; Needs assistance with ADLs and functional mobility   Lives With Facility staff   Receives Help From Personal care attendant   Falls in the last 6 months 1 to 4   Comments Per CM Ax1 with RW prior to admission   Restrictions/Precautions   Other Precautions Chair Alarm; Bed Alarm;Cognitive; Fall Risk   General   Additional Pertinent History Pt  is a 67 yo F who presents with abdominal pain  Dx: acute pancreatitis  Family/Caregiver Present No   Cognition   Overall Cognitive Status Impaired   Arousal/Participation Cooperative   Attention Difficulty attending to directions   Orientation Level Oriented to person;Disoriented to place; Disoriented to time;Disoriented to situation   Memory Decreased recall of biographical information;Decreased short term memory;Decreased recall of recent events;Decreased long term memory   Following Commands Follows one step commands with increased time or repetition   Comments Pt  identified with full name and birthdate   RLDEO Assessment   RLE Assessment   (functionally >3+/5)   LLE Assessment   LLE Assessment   (functionally >3+/5)   Coordination   Movements are Fluid and Coordinated 0   Coordination and Movement Description poor motor planning 2/2 to cognitive deficits   Sensation [facility]   Light Touch   RLE Light Touch Grossly intact   LLE Light Touch Grossly intact   Bed Mobility   Supine to Sit 5  Supervision   Additional items Assist x 1; Increased time required;HOB elevated   Sit to Supine Unable to assess   Additional Comments Pt  seated OOB in recliner following PT session   Transfers   Sit to Stand 4  Minimal assistance   Additional items Assist x 1; Increased time required;Verbal cues  (for hand placement and sequencing )   Stand to Sit 4  Minimal assistance   Additional items Assist x 1; Impulsive;Verbal cues  (to reach back to control descent)   Additional Comments MinAx1 to attain standing and for static balance in standing intially    Ambulation/Elevation   Gait pattern Improper Weight shift;Narrow MER; Forward Flexion;Decreased foot clearance;Shuffling; Inconsistent ifrah; Redundant gait at times; Short stride; Step to;Excessively slow   Gait Assistance 5  Supervision   Additional items Assist x 1;Verbal cues  (for course navigation and motor planning)   Assistive Device Rolling walker   Distance 300'x1   Balance   Static Sitting Fair +   Dynamic Sitting Fair   Static Standing Fair -   Dynamic Standing Fair -   Ambulatory Fair -   Endurance Deficit   Endurance Deficit Yes   Endurance Deficit Description postural and gait degradation noted with fatigue   Activity Tolerance   Activity Tolerance Patient limited by fatigue   Medical Staff Made Aware Spoke to CM    Nurse Made Aware Spoke to RN   Assessment   Prognosis Good   Problem List Decreased strength;Decreased range of motion;Decreased endurance; Impaired balance;Decreased cognition;Decreased mobility; Decreased coordination;Decreased safety awareness   Assessment Pt  is a 65 yo F who presents with abdominal pain  Dx: acute pancreatitis  order placed for PT eval and tx, w/ activity order of up w/ A  pt presents w/ comorbidities of htn, left hip pain s/p fall, frontotemporal dementia without behavioral disturbance, primary progressive aphasia, PVD, ambulatory dysfunction and personal factors of mobilizing w/ assistive device, inability to navigate level surfaces w/o external assistance, unable to perform dynamic tasks in community, decreased cognition, limited home support, positive fall history, inability to perform current job functions, inability to perform IADLs, inability to perform ADLs and inability to live alone  pt presents w/ pain, weakness, decreased endurance, impaired cognition, impaired balance, gait deviations, impaired coordination, decreased safety awareness and fall risk  these impairments are evident in findings from physical examination (weakness and impaired coordination), mobility assessment (need for Min assist w/ all phases of mobility when usually mobilizing independently, tolerance to only 300 feet of ambulation and need for cueing for mobility technique), and Barthel Index: 35/100  pt needed input for task focus and mobility technique  pt is at risk for falls due to physical and safety awareness deficits  pt's clinical presentation is unstable/unpredictable (evident in need for assist w/ all phases of mobility when usually mobilizing independently, tolerance to only 300 feet of ambulation, pain impacting overall mobility status, need for input for mobility technique, need for input for task focus and mobility technique and need for input for mobility technique/safety)  pt needs inpatient PT tx to improve mobility deficits  discharge recommendation is for return to [facility] with PT consult if onsite to reduce fall risk and maximize level of functional independence  Goals   Patient Goals none stated   STG Expiration Date 10/30/19   Short Term Goal #1 pt will:  Increase bilateral LE strength 1/2 grade to facilitate independent mobility, Perform all bed mobility tasks modified independent to decrease fall risk factors, Perform all transfers modified independent to improve independence, Ambulate 500 ft  with roller walker modified independent w/o LOB, Increase all balance 1/2 grade to decrease risk for falls and Improve Barthel Index score to 60 or greater to facilitate independence   PT Treatment Day 0   Plan   Treatment/Interventions Functional transfer training;LE strengthening/ROM; Therapeutic exercise; Endurance training;Cognitive reorientation;Patient/family training;Equipment eval/education; Bed mobility;Gait training;Spoke to nursing;Spoke to case management; Family   PT Frequency   (3-5x/week)   Recommendation   Recommendation   (return to long-term with PT consult if onsite)   Equipment Recommended Walker   PT - OK to Discharge Yes   Additional Comments yes return to long-term when medically appropriatge   Barthel Index   Feeding 5   Bathing 0   Grooming Score 0   Dressing Score 0   Bladder Score 5   Bowels Score 0   Toilet Use Score 5   Transfers (Bed/Chair) Score 10   Mobility (Level Surface) Score 10   Stairs Score 0   Barthel Index Score 35     Skilled PT recommended while in hospital and upon DC (at [facility] if able) to progress pt toward treatment goals       Thompson Nixon, PT, DPT 10/20/2019

## 2019-10-20 NOTE — SOCIAL WORK
[address] confirmed facility able to accept pt return today  Pt appropriate for return to same level of care in prior unit  Discussed cost of WCV with patient's daughter Karolina Betancourt who is agreeable to out of pocket cost  WCV scheduled for 4:00pm pickup time with [facility]  Face sheet faxed to EMS  Pt's daughter made aware of pickup time via VM to self identifying phone  SLIM PA-C, nursing and facility all made aware

## 2019-10-20 NOTE — ASSESSMENT & PLAN NOTE
· Presents with vague complaints of abdominal pain, abnormal CT abdomen and elevated lipase of 1,604  · Lipase has normalized  · CT abdomen/pelvis:  Mild fecal stasis  Mild prominence of pancreatic duct without any obvious underlying pancreatic lesion  Hyper enhancing lesion within the liver could represent hemangioma  · RUQ ultrasound:  Echogenic lesion noted on CT scan is not visible on ultrasound  Gallbladder is normal in caliber with no wall thickening or jessie cholecystic fluid  There is an isolated shadowing focus within the gallbladder lumen which may represent a gallstone but no sonographic Ember Newness sign is noted  Common bile duct is normal   · Surgery has evaluated the patient and at this time, there is no surgical intervention recommended  · Triglycerides are normal   · Diet as tolerated

## 2019-10-20 NOTE — SOCIAL WORK
Pt is confused  CM t/c w/pt's daughter/emergency contact Tiffany Felix [phone] to discuss CM role and possible d/c needs  Pt is resident at [facility] in locked dementia unit  Daughter confirms d/c plan is to return to facility  CM t/c w/ nursing supervisor Arsenio @ [facility] [phone] who confirmed pt is an assist x 1 with ADLs, resides in locked unit and ambulates with cane  Nursing supervisor confirms pt may return when medically stable, but requests PT notes faxed for review prior to discharge  SU SEGAL updated re: same  CM messaged PT re: same  CM following  CM reviewed d/c planning process including the following: identifying help at home, patient preference for d/c planning needs, Discharge Lounge, [facility] Meds to Bed program, availability of treatment team to discuss questions or concerns patient and/or family may have regarding understanding medications and recognizing signs and symptoms once discharged  CM also encouraged patient to follow up with all recommended appointments after discharge  Patient advised of importance for patient and family to participate in managing patients medical well being

## 2019-10-22 ENCOUNTER — APPOINTMENT (EMERGENCY)
Dept: RADIOLOGY | Facility: HOSPITAL | Age: 74
End: 2019-10-22
Payer: COMMERCIAL

## 2019-10-22 ENCOUNTER — HOSPITAL ENCOUNTER (EMERGENCY)
Facility: HOSPITAL | Age: 74
Discharge: HOME | End: 2019-10-22
Attending: EMERGENCY MEDICINE | Admitting: EMERGENCY MEDICINE
Payer: COMMERCIAL

## 2019-10-22 VITALS
DIASTOLIC BLOOD PRESSURE: 95 MMHG | WEIGHT: 120 LBS | TEMPERATURE: 98.7 F | HEART RATE: 96 BPM | SYSTOLIC BLOOD PRESSURE: 170 MMHG | OXYGEN SATURATION: 97 % | BODY MASS INDEX: 24.24 KG/M2 | RESPIRATION RATE: 16 BRPM
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
## 2019-10-22 DIAGNOSIS — S09.90XA CHI (CLOSED HEAD INJURY): Primary | ICD-10-CM

## 2019-10-22 DIAGNOSIS — M25.569 KNEE PAIN: ICD-10-CM

## 2019-10-22 PROCEDURE — 70450 CT HEAD/BRAIN W/O DYE: CPT

## 2019-10-22 PROCEDURE — 73564 X-RAY EXAM KNEE 4 OR MORE: CPT

## 2019-10-22 PROCEDURE — 71250 CT THORAX DX C-: CPT

## 2019-10-22 PROCEDURE — 93005 ELECTROCARDIOGRAM TRACING: CPT

## 2019-10-22 PROCEDURE — 99284 EMERGENCY DEPT VISIT MOD MDM: CPT

## 2019-10-22 PROCEDURE — 99285 EMERGENCY DEPT VISIT HI MDM: CPT | Performed by: EMERGENCY MEDICINE

## 2019-10-22 NOTE — ED NOTES
[address] [facility] crew @ 2000 for Umesh Melton 112, RN  10/22/19 5181

## 2019-10-22 NOTE — ED PROVIDER NOTES
History  Chief Complaint   Patient presents with   Candelario Tryon Fall     pt had an unwitnessed fall at [facility] unit  no thinners  c/o left leg pain  Patient is a 59-year-old female presenting for unwitnessed fall at nursing facility  Patient has history of frontal temporal dementia and is in a dementia unit  Patient states that she did not fall, she says that she was using her walker and fell to the ground landing on her left knee  She denies hitting her head, chest pain, shortness of breath, abdominal pain  Patient is presently complaining of left knee pain however can move her knee in the bed and allows me to range her knee  Prior to Admission Medications   Prescriptions Last Dose Informant Patient Reported? Taking?    Nutritional Supplements (ENSURE ACTIVE HIGH PROTEIN) LIQD   No No   Sig: Take 8 oz by mouth 2 (two) times a day   Patient taking differently: Take 8 oz by mouth daily    Nutritional Supplements (ENSURE ACTIVE HIGH PROTEIN) LIQD   No No   Sig: Take 8 oz by mouth 2 (two) times a day   Patient not taking: Reported on 10/18/2019   acetaminophen (TYLENOL) 325 mg tablet   No Yes   Sig: Take 2 tablets (650 mg total) by mouth every 6 (six) hours as needed for mild pain, moderate pain, headaches or fever   acetaminophen (TYLENOL) 325 mg tablet   No No   Sig: Take 3 tablets (975 mg total) by mouth every 8 (eight) hours for 3 days   artificial tear (LUBRIFRESH P M ) 83-15 % ophthalmic ointment   No No   Sig: Administer to both eyes daily at bedtime   calcium carbonate-vitamin D (OSCAL-D) 500 mg-200 units per tablet   No No   Sig: Take 1 tablet by mouth daily with breakfast   cyanocobalamin 1000 MCG tablet   No No   Sig: Take 1 tablet (1,000 mcg total) by mouth daily   docusate sodium (COLACE) 100 mg capsule   No Yes   Sig: Take 1 capsule (100 mg total) by mouth daily   lisinopril (ZESTRIL) 5 mg tablet   No No   Sig: Take 1 tablet (5 mg total) by mouth daily   rivastigmine (EXELON) 4 5 MG capsule   No No   Sig: Take 1 capsule (4 5 mg total) by mouth daily      Facility-Administered Medications: None       Past Medical History:   Diagnosis Date    Dementia ([facility] )     [facility] mellitus ([facility] )     Expressive aphasia     Hyperlipidemia     Hypertension     Impaired fasting glucose     Osteoarthritis     Vitamin D deficiency        Past Surgical History:   Procedure Laterality Date    JOINT REPLACEMENT      right knee       History reviewed  No pertinent family history  I have reviewed and agree with the history as documented  Social History     Tobacco Use    Smoking status: Never Smoker    Smokeless tobacco: Never Used   Substance Use Topics    Alcohol use: Never     Frequency: Never    Drug use: Never        Review of Systems   Respiratory: Negative for shortness of breath  Cardiovascular: Negative for chest pain  Gastrointestinal: Negative for abdominal pain  Musculoskeletal: Negative for back pain and neck pain  Neurological: Negative for syncope and headaches  All other systems reviewed and are negative  Physical Exam  ED Triage Vitals [10/22/19 1447]   Temperature Pulse Respirations Blood Pressure SpO2   98 7 °F (37 1 °C) 77 18 (!) 173/96 97 %      Temp Source Heart Rate Source Patient Position - Orthostatic VS BP Location FiO2 (%)   Oral -- -- -- --      Pain Score       No Pain             Orthostatic Vital Signs  Vitals:    10/22/19 1447 10/22/19 1745   BP: (!) 173/96 170/95   Pulse: 77 96       Physical Exam   Constitutional: She appears well-developed and well-nourished  No distress  HENT:   Head: Normocephalic and atraumatic  Right Ear: Tympanic membrane normal  No hemotympanum  Left Ear: Tympanic membrane normal  No hemotympanum  Eyes: Pupils are equal, round, and reactive to light  Conjunctivae and EOM are normal  Right eye exhibits no discharge  Left eye exhibits no discharge  Neck: Normal range of motion     No midline C-spine tenderness, patient does not arrive in collar   Cardiovascular: Normal rate, regular rhythm and intact distal pulses  Pulmonary/Chest: Effort normal and breath sounds normal  No stridor  No respiratory distress  She has no wheezes  She has no rales  She exhibits tenderness (Bilateral lower chest wall tenderness, no ecchymosis or signs of trauma)  Abdominal: Soft  She exhibits no distension  There is no tenderness  There is no rebound and no guarding  Musculoskeletal: She exhibits edema (Bilateral lower extremity edema, patient presently has stockings on for swelling) and tenderness (Tenderness to left knee, patient is able to move her knee on her own and allows me to range her knee)  She exhibits no deformity  No hip pain with movement, no pelvic pain with compression; 5/5 strength in upper extremities bilaterally, equal strength in lower extremities bilaterally; no midline C/T/L-spine tenderness   Neurological: She is alert  No cranial nerve deficit or sensory deficit  She exhibits normal muscle tone  Skin: Skin is warm  She is not diaphoretic  No erythema  ED Medications  Medications - No data to display    Diagnostic Studies  Results Reviewed     None                 XR knee 4+ vw left injury   Final Result by Russ 6, DO (10/22 1634)      No acute osseous abnormality  Small knee joint effusion  Degenerative changes as described  Workstation performed: [id number]         CT head without contrast   Final Result by Jamaica Young MD (10/22 4438)      No acute intracranial abnormality  Again noted is ventriculomegaly out of proportion to sulcal atrophy  A degree of normal pressure hydrocephalus is not excluded although stable from prior CT examinations                                   Workstation performed: [id number]         CT chest without contrast   Final Result by Jamaica Young MD (10/22 7478)      No acute posttraumatic abnormality identified in the chest  Workstation performed: [id number]               Procedures  Procedures        ED Course           Identification of Seniors at Risk      Most Recent Value   (ISAR) Identification of Seniors at Risk   Before the illness or injury that brought you to the Emergency, did you need someone to help you on a regular basis? 1 Filed at: 10/22/2019 1451   In the last 24 hours, have you needed more help than usual?  1 Filed at: 10/22/2019 1451   Have you been hospitalized for one or more nights during the past 6 months? 1 Filed at: 10/22/2019 1451   In general, do you see well? 1 Filed at: 10/22/2019 1451   In general, do you have serious problems with your memory? 1 Filed at: 10/22/2019 1451   Do you take more than three different medications every day? 1 Filed at: 10/22/2019 1451   ISAR Score  6 Filed at: 10/22/2019 1451                          MDM  Number of Diagnoses or Management Options  CHI (closed head injury):   Fall:   Knee pain:   Diagnosis management comments: No acute injury, patient was discharged and to her nursing facility  Disposition  Final diagnoses:   CHI (closed head injury)   Fall   Knee pain     Time reflects when diagnosis was documented in both MDM as applicable and the Disposition within this note     Time User Action Codes Description Comment    10/22/2019  4:42 PM Dee Richardson Add [B08 24GM] Shar  Sh[address] (closed head injury)     10/22/2019  4:43 PM Katia Peters Aleisha Kaminski  XXXA] Fall     10/22/2019  4:44 PM Katia Toribio Add [V37 181] Knee pain       ED Disposition     ED Disposition Condition Date/Time Comment    Discharge Stable Tue Oct 22, 2019  4:41 PM Kassy Wells discharge to home/self care              Follow-up Information     Follow up With Specialties Details Why 2434 TEJAS Jo MD Family Medicine  As needed [address] [address] [phone]            Discharge Medication List as of 10/22/2019  4:58 PM      CONTINUE these medications which have NOT CHANGED    Details   artificial tear (LUBRIFRESH P M ) 83-15 % ophthalmic ointment Administer to both eyes daily at bedtime, Starting Fri 5/3/2019, Print      calcium carbonate-vitamin D (OSCAL-D) 500 mg-200 units per tablet Take 1 tablet by mouth daily with breakfast, Starting Sat 5/4/2019, Print      docusate sodium (COLACE) 100 mg capsule Take 1 capsule (100 mg total) by mouth daily, Starting Fri 5/3/2019, Print      lisinopril (ZESTRIL) 5 mg tablet Take 1 tablet (5 mg total) by mouth daily, Starting Fri 5/3/2019, Until Sat 5/2/2020, Print      !! Nutritional Supplements (ENSURE ACTIVE HIGH PROTEIN) LIQD Take 8 oz by mouth 2 (two) times a day, Starting Fri 5/3/2019, Print      rivastigmine (EXELON) 4 5 MG capsule Take 1 capsule (4 5 mg total) by mouth daily, Starting Fri 5/3/2019, Print      !! acetaminophen (TYLENOL) 325 mg tablet Take 2 tablets (650 mg total) by mouth every 6 (six) hours as needed for mild pain, moderate pain, headaches or fever, Starting Fri 5/3/2019, Print      !! acetaminophen (TYLENOL) 325 mg tablet Take 3 tablets (975 mg total) by mouth every 8 (eight) hours for 3 days, Starting Sun 10/20/2019, Until Wed 10/23/2019, No Print      cyanocobalamin 1000 MCG tablet Take 1 tablet (1,000 mcg total) by mouth daily, Starting Fri 5/3/2019, Print      !! Nutritional Supplements (ENSURE ACTIVE HIGH PROTEIN) LIQD Take 8 oz by mouth 2 (two) times a day, Starting Wed 7/24/2019, No Print       !! - Potential duplicate medications found  Please discuss with provider  No discharge procedures on file  ED Provider  Attending physically available and evaluated Diana Decker I managed the patient along with the ED Attending      Electronically Signed by         Abigail December, DO  10/25/19 8872

## 2019-10-23 LAB
ATRIAL RATE: 81 BPM
P AXIS: 61 DEGREES
PR INTERVAL: 142 MS
QRS AXIS: 59 DEGREES
QRSD INTERVAL: 76 MS
QT INTERVAL: 360 MS
QTC INTERVAL: 418 MS
T WAVE AXIS: 62 DEGREES
VENTRICULAR RATE: 81 BPM

## 2019-10-23 PROCEDURE — 93010 ELECTROCARDIOGRAM REPORT: CPT | Performed by: INTERNAL MEDICINE

## 2019-10-29 NOTE — ED ATTENDING ATTESTATION
10/22/2019  IEla DO, saw and evaluated the patient  I have discussed the patient with the resident/non-physician practitioner and agree with the resident's/non-physician practitioner's findings, Plan of Care, and MDM as documented in the resident's/non-physician practitioner's note, except where noted  All available labs and Radiology studies were reviewed  I was present for key portions of any procedure(s) performed by the resident/non-physician practitioner and I was immediately available to provide assistance  At this point I agree with the current assessment done in the Emergency Department  I have conducted an independent evaluation of this patient a history and physical is as follows:    66 yof with frontotemporal dementia  Literally laughs at mostly everything during history but otherwise history unobtainable  Appears in no distress  Found down after unwitnessed fall  Ct head   Xray knee    ED Course         Critical Care Time  Procedures

## 2019-11-03 ENCOUNTER — HOSPITAL ENCOUNTER (INPATIENT)
Facility: HOSPITAL | Age: 74
LOS: 7 days | Discharge: HOME | DRG: 603 | End: 2019-11-10
Attending: EMERGENCY MEDICINE | Admitting: INTERNAL MEDICINE
Payer: COMMERCIAL

## 2019-11-03 ENCOUNTER — APPOINTMENT (INPATIENT)
Dept: RADIOLOGY | Facility: HOSPITAL | Age: 74
DRG: 603 | End: 2019-11-03
Payer: COMMERCIAL

## 2019-11-03 DIAGNOSIS — E11.621 DIABETIC FOOT ULCER (HCC): ICD-10-CM

## 2019-11-03 DIAGNOSIS — L03.115 CELLULITIS OF RIGHT LEG: ICD-10-CM

## 2019-11-03 DIAGNOSIS — L97.509 DIABETIC FOOT ULCER (HCC): ICD-10-CM

## 2019-11-03 DIAGNOSIS — L03.115 CELLULITIS OF RIGHT ANTERIOR LOWER LEG: Primary | ICD-10-CM

## 2019-11-03 LAB
ALBUMIN SERPL BCP-MCNC: 2.9 G/DL (ref 3.5–5)
ALP SERPL-CCNC: 120 U/L (ref 46–116)
ALT SERPL W P-5'-P-CCNC: 31 U/L (ref 12–78)
ANION GAP SERPL CALCULATED.3IONS-SCNC: 6 MMOL/L (ref 4–13)
APTT PPP: 27 SECONDS (ref 23–37)
AST SERPL W P-5'-P-CCNC: 22 U/L (ref 5–45)
BASOPHILS # BLD AUTO: 0.09 THOUSANDS/ΜL (ref 0–0.1)
BASOPHILS NFR BLD AUTO: 1 % (ref 0–1)
BILIRUB SERPL-MCNC: 0.44 MG/DL (ref 0.2–1)
BUN SERPL-MCNC: 17 MG/DL (ref 5–25)
CALCIUM SERPL-MCNC: 9.3 MG/DL (ref 8.3–10.1)
CHLORIDE SERPL-SCNC: 105 MMOL/L (ref 100–108)
CO2 SERPL-SCNC: 27 MMOL/L (ref 21–32)
CREAT SERPL-MCNC: 0.6 MG/DL (ref 0.6–1.3)
EOSINOPHIL # BLD AUTO: 0.19 THOUSAND/ΜL (ref 0–0.61)
EOSINOPHIL NFR BLD AUTO: 1 % (ref 0–6)
ERYTHROCYTE [DISTWIDTH] IN BLOOD BY AUTOMATED COUNT: 12.7 % (ref 11.6–15.1)
GFR SERPL CREATININE-BSD FRML MDRD: 89 ML/MIN/1.73SQ M
GLUCOSE SERPL-MCNC: 95 MG/DL (ref 65–140)
HCT VFR BLD AUTO: 37.9 % (ref 34.8–46.1)
HGB BLD-MCNC: 12 G/DL (ref 11.5–15.4)
IMM GRANULOCYTES # BLD AUTO: 0.1 THOUSAND/UL (ref 0–0.2)
IMM GRANULOCYTES NFR BLD AUTO: 1 % (ref 0–2)
INR PPP: 1.1 (ref 0.84–1.19)
LACTATE SERPL-SCNC: 1.6 MMOL/L (ref 0.5–2)
LYMPHOCYTES # BLD AUTO: 1.94 THOUSANDS/ΜL (ref 0.6–4.47)
LYMPHOCYTES NFR BLD AUTO: 12 % (ref 14–44)
MCH RBC QN AUTO: 28.4 PG (ref 26.8–34.3)
MCHC RBC AUTO-ENTMCNC: 31.7 G/DL (ref 31.4–37.4)
MCV RBC AUTO: 90 FL (ref 82–98)
MONOCYTES # BLD AUTO: 1.35 THOUSAND/ΜL (ref 0.17–1.22)
MONOCYTES NFR BLD AUTO: 8 % (ref 4–12)
NEUTROPHILS # BLD AUTO: 12.45 THOUSANDS/ΜL (ref 1.85–7.62)
NEUTS SEG NFR BLD AUTO: 77 % (ref 43–75)
NRBC BLD AUTO-RTO: 0 /100 WBCS
PLATELET # BLD AUTO: 477 THOUSANDS/UL (ref 149–390)
PLATELET # BLD AUTO: 505 THOUSANDS/UL (ref 149–390)
PMV BLD AUTO: 9.5 FL (ref 8.9–12.7)
PMV BLD AUTO: 9.6 FL (ref 8.9–12.7)
POTASSIUM SERPL-SCNC: 3.7 MMOL/L (ref 3.5–5.3)
PROCALCITONIN SERPL-MCNC: <0.05 NG/ML
PROT SERPL-MCNC: 7.3 G/DL (ref 6.4–8.2)
PROTHROMBIN TIME: 13.8 SECONDS (ref 11.6–14.5)
RBC # BLD AUTO: 4.22 MILLION/UL (ref 3.81–5.12)
SODIUM SERPL-SCNC: 138 MMOL/L (ref 136–145)
WBC # BLD AUTO: 16.12 THOUSAND/UL (ref 4.31–10.16)

## 2019-11-03 PROCEDURE — 96365 THER/PROPH/DIAG IV INF INIT: CPT

## 2019-11-03 PROCEDURE — 85049 AUTOMATED PLATELET COUNT: CPT | Performed by: INTERNAL MEDICINE

## 2019-11-03 PROCEDURE — 85610 PROTHROMBIN TIME: CPT | Performed by: EMERGENCY MEDICINE

## 2019-11-03 PROCEDURE — 73630 X-RAY EXAM OF FOOT: CPT

## 2019-11-03 PROCEDURE — 85025 COMPLETE CBC W/AUTO DIFF WBC: CPT | Performed by: EMERGENCY MEDICINE

## 2019-11-03 PROCEDURE — 99285 EMERGENCY DEPT VISIT HI MDM: CPT | Performed by: EMERGENCY MEDICINE

## 2019-11-03 PROCEDURE — 99284 EMERGENCY DEPT VISIT MOD MDM: CPT

## 2019-11-03 PROCEDURE — 83605 ASSAY OF LACTIC ACID: CPT | Performed by: EMERGENCY MEDICINE

## 2019-11-03 PROCEDURE — 36415 COLL VENOUS BLD VENIPUNCTURE: CPT | Performed by: EMERGENCY MEDICINE

## 2019-11-03 PROCEDURE — 80053 COMPREHEN METABOLIC PANEL: CPT | Performed by: EMERGENCY MEDICINE

## 2019-11-03 PROCEDURE — 85730 THROMBOPLASTIN TIME PARTIAL: CPT | Performed by: EMERGENCY MEDICINE

## 2019-11-03 PROCEDURE — 99223 1ST HOSP IP/OBS HIGH 75: CPT | Performed by: INTERNAL MEDICINE

## 2019-11-03 PROCEDURE — 87040 BLOOD CULTURE FOR BACTERIA: CPT | Performed by: EMERGENCY MEDICINE

## 2019-11-03 PROCEDURE — 84145 PROCALCITONIN (PCT): CPT | Performed by: EMERGENCY MEDICINE

## 2019-11-03 PROCEDURE — 87081 CULTURE SCREEN ONLY: CPT | Performed by: INTERNAL MEDICINE

## 2019-11-03 RX ORDER — ACETAMINOPHEN 325 MG/1
650 TABLET ORAL EVERY 6 HOURS SCHEDULED
Status: DISCONTINUED | OUTPATIENT
Start: 2019-11-03 | End: 2019-11-10 | Stop reason: HOSPADM

## 2019-11-03 RX ORDER — LISINOPRIL 5 MG/1
5 TABLET ORAL DAILY
Status: DISCONTINUED | OUTPATIENT
Start: 2019-11-04 | End: 2019-11-10 | Stop reason: HOSPADM

## 2019-11-03 RX ORDER — DOCUSATE SODIUM 100 MG/1
100 CAPSULE, LIQUID FILLED ORAL DAILY
Status: DISCONTINUED | OUTPATIENT
Start: 2019-11-04 | End: 2019-11-10 | Stop reason: HOSPADM

## 2019-11-03 RX ORDER — B-COMPLEX WITH VITAMIN C
1 TABLET ORAL
Status: DISCONTINUED | OUTPATIENT
Start: 2019-11-04 | End: 2019-11-10 | Stop reason: HOSPADM

## 2019-11-03 RX ORDER — LACTOSE-REDUCED FOOD
8 LIQUID (ML) ORAL DAILY
Status: DISCONTINUED | OUTPATIENT
Start: 2019-11-04 | End: 2019-11-05

## 2019-11-03 RX ORDER — HEPARIN SODIUM 5000 [USP'U]/ML
5000 INJECTION, SOLUTION INTRAVENOUS; SUBCUTANEOUS EVERY 8 HOURS SCHEDULED
Status: DISCONTINUED | OUTPATIENT
Start: 2019-11-03 | End: 2019-11-10 | Stop reason: HOSPADM

## 2019-11-03 RX ORDER — MINERAL OIL AND PETROLATUM 150; 830 MG/G; MG/G
OINTMENT OPHTHALMIC
Status: DISCONTINUED | OUTPATIENT
Start: 2019-11-03 | End: 2019-11-10 | Stop reason: HOSPADM

## 2019-11-03 RX ORDER — SODIUM CHLORIDE 9 MG/ML
3 INJECTION INTRAVENOUS AS NEEDED
Status: DISCONTINUED | OUTPATIENT
Start: 2019-11-03 | End: 2019-11-10 | Stop reason: HOSPADM

## 2019-11-03 RX ADMIN — HEPARIN SODIUM 5000 UNITS: 5000 INJECTION INTRAVENOUS; SUBCUTANEOUS at 19:34

## 2019-11-03 RX ADMIN — WHITE PETROLATUM 57.7 %-MINERAL OIL 31.9 % EYE OINTMENT 1 APPLICATION: at 21:34

## 2019-11-03 RX ADMIN — ACETAMINOPHEN 650 MG: 325 TABLET ORAL at 14:45

## 2019-11-03 RX ADMIN — VANCOMYCIN HYDROCHLORIDE 750 MG: 750 INJECTION, SOLUTION INTRAVENOUS at 13:35

## 2019-11-03 RX ADMIN — ACETAMINOPHEN 650 MG: 325 TABLET ORAL at 19:34

## 2019-11-03 RX ADMIN — CEFEPIME HYDROCHLORIDE 2000 MG: 2 INJECTION, POWDER, FOR SOLUTION INTRAVENOUS at 12:34

## 2019-11-03 NOTE — ED PROVIDER NOTES
History  Chief Complaint   Patient presents with    Foot Swelling     Pt was sent in by EMS for having increased swelling in her right foot  Pt c/o pain in right foot     66-year-old female with frontotemporal dementia presenting for evaluation of right lower extremity swelling  Patient unable to offer any complaints at this time as she is disoriented pleasantly demented  Patient has redness and swelling up to the mid upper lower extremity and is tender she also has a heel ulcer which is dressed with bandages from the nursing home  Patient is afebrile here she has no other complaints she feels otherwise well besides pain in her leg  History provided by:  Patient   used: No    Rash   Location:  Leg  Leg rash location:  R lower leg and R ankle  Quality: painful, redness and swelling    Pain details:     Quality:  Hot    Severity:  Moderate    Onset quality:  Sudden    Timing:  Constant    Progression:  Worsening  Severity:  Moderate  Onset quality:  Unable to specify  Timing:  Constant  Progression:  Worsening  Relieved by:  None tried  Worsened by:  Nothing  Ineffective treatments:  None tried      Prior to Admission Medications   Prescriptions Last Dose Informant Patient Reported? Taking?    Nutritional Supplements (ENSURE ACTIVE HIGH PROTEIN) LIQD   No No   Sig: Take 8 oz by mouth 2 (two) times a day   Patient taking differently: Take 8 oz by mouth daily    Nutritional Supplements (ENSURE ACTIVE HIGH PROTEIN) LIQD   No No   Sig: Take 8 oz by mouth 2 (two) times a day   Patient not taking: Reported on 10/18/2019   acetaminophen (TYLENOL) 325 mg tablet   No No   Sig: Take 2 tablets (650 mg total) by mouth every 6 (six) hours as needed for mild pain, moderate pain, headaches or fever   artificial tear (LUBRIFRESH P M ) 83-15 % ophthalmic ointment   No Yes   Sig: Administer to both eyes daily at bedtime   calcium carbonate-vitamin D (OSCAL-D) 500 mg-200 units per tablet   No Yes   Sig: Take 1 tablet by mouth daily with breakfast   cyanocobalamin 1000 MCG tablet   No No   Sig: Take 1 tablet (1,000 mcg total) by mouth daily   docusate sodium (COLACE) 100 mg capsule   No Yes   Sig: Take 1 capsule (100 mg total) by mouth daily   lisinopril (ZESTRIL) 5 mg tablet   No Yes   Sig: Take 1 tablet (5 mg total) by mouth daily   rivastigmine (EXELON) 4 5 MG capsule   No Yes   Sig: Take 1 capsule (4 5 mg total) by mouth daily      Facility-Administered Medications: None       Past Medical History:   Diagnosis Date    [facility]     [facility]     Expressive aphasia     Hyperlipidemia     Hypertension     Impaired fasting glucose     Osteoarthritis     Vitamin D deficiency        Past Surgical History:   Procedure Laterality Date    JOINT REPLACEMENT      right knee       History reviewed  No pertinent family history  I have reviewed and agree with the history as documented  Social History     Tobacco Use    Smoking status: Never Smoker    Smokeless tobacco: Never Used   Substance Use Topics    Alcohol use: Never     Frequency: Never    Drug use: Never        Review of Systems   Unable to perform ROS: Dementia   Cardiovascular: Positive for leg swelling  Skin: Positive for rash  Physical Exam  ED Triage Vitals [11/03/19 1057]   Temperature Pulse Respirations Blood Pressure SpO2   97 5 °F (36 4 °C) 96 18 153/65 95 %      Temp Source Heart Rate Source Patient Position - Orthostatic VS BP Location FiO2 (%)   Oral Monitor Sitting Right arm --      Pain Score       Worst Possible Pain             Orthostatic Vital Signs  Vitals:    11/03/19 1400 11/03/19 1418 11/03/19 1500 11/03/19 1620   BP: 127/59 127/59 137/60 126/58   Pulse: 94 85 102 86   Patient Position - Orthostatic VS:   Sitting Lying       Physical Exam   Constitutional: She is oriented to person, place, and time  She appears well-developed and well-nourished  No distress  HENT:   Head: Normocephalic and atraumatic  Eyes: Conjunctivae and EOM are normal  No scleral icterus  Neck: Normal range of motion  Neck supple  Cardiovascular: Normal rate, regular rhythm and normal heart sounds  No murmur heard  Pulmonary/Chest: Effort normal and breath sounds normal  No respiratory distress  Abdominal: Soft  Bowel sounds are normal  There is no tenderness  Musculoskeletal: Normal range of motion  She exhibits edema and tenderness  Neurological: She is alert and oriented to person, place, and time  Skin: Skin is warm and dry  Rash noted  Psychiatric: She has a normal mood and affect  Her behavior is normal    Nursing note and vitals reviewed        ED Medications  Medications   sodium chloride (PF) 0 9 % injection 3 mL (has no administration in time range)   artificial tear (LUBRIFRESH P M ) ophthalmic ointment (has no administration in time range)   calcium carbonate-vitamin D (OSCAL-D) 500 mg-200 units per tablet 1 tablet (has no administration in time range)   cyanocobalamin (VITAMIN B-12) tablet 1,000 mcg (has no administration in time range)   docusate sodium (COLACE) capsule 100 mg (has no administration in time range)   lisinopril (ZESTRIL) tablet 5 mg (has no administration in time range)   ENSURE ACTIVE HIGH PROTEIN LIQD 8 oz (has no administration in time range)   heparin (porcine) subcutaneous injection 5,000 Units (5,000 Units Subcutaneous Given 11/3/19 1934)   cefepime (MAXIPIME) 2 g/50 mL dextrose IVPB (2,000 mg Intravenous Not Given 11/3/19 1418)   rivastigmine (EXELON) capsule 4 5 mg (has no administration in time range)   acetaminophen (TYLENOL) tablet 650 mg (650 mg Oral Given 11/3/19 1934)   vancomycin (VANCOCIN) IVPB (premix) 750 mg (has no administration in time range)   vancomycin (VANCOCIN) IVPB (premix) 750 mg (0 mg/kg × 54 4 kg Intravenous Stopped 11/3/19 1508)   cefepime (MAXIPIME) 2 g/50 mL dextrose IVPB (0 mg Intravenous Stopped 11/3/19 1335)       Diagnostic Studies  Results Reviewed Procedure Component Value Units Date/Time    Procalcitonin [[id number]]  (Normal) Collected:  11/03/19 1144    Lab Status:  Final result Specimen:  Blood from Arm, Left Updated:  11/03/19 1241     Procalcitonin <0 05 ng/ml     Comprehensive metabolic panel [[id number]]  (Abnormal) Collected:  11/03/19 1144    Lab Status:  Final result Specimen:  Blood from Arm, Left Updated:  11/03/19 1215     Sodium 138 mmol/L      Potassium 3 7 mmol/L      Chloride 105 mmol/L      CO2 27 mmol/L      ANION GAP 6 mmol/L      BUN 17 mg/dL      Creatinine 0 60 mg/dL      Glucose 95 mg/dL      Calcium 9 3 mg/dL      AST 22 U/L      ALT 31 U/L      Alkaline Phosphatase 120 U/L      Total Protein 7 3 g/dL      Albumin 2 9 g/dL      Total Bilirubin 0 44 mg/dL      eGFR 89 ml/min/1 73sq m     Narrative:       [place] guidelines for Chronic Kidney Disease (CKD):     Stage 1 with normal or high GFR (GFR > 90 mL/min/1 73 square meters)    Stage 2 Mild CKD (GFR = 60-89 mL/min/1 73 square meters)    Stage 3A Moderate CKD (GFR = 45-59 mL/min/1 73 square meters)    Stage 3B Moderate CKD (GFR = 30-44 mL/min/1 73 square meters)    Stage 4 Severe CKD (GFR = 15-29 mL/min/1 73 square meters)    Stage 5 End Stage CKD (GFR <15 mL/min/1 73 square meters)  Note: GFR calculation is accurate only with a steady state creatinine    Lactic Acid x2 [[id number]]  (Normal) Collected:  11/03/19 1144    Lab Status:  Final result Specimen:  Blood from Arm, Left Updated:  11/03/19 1214     LACTIC ACID 1 6 mmol/L     Narrative:       Result may be elevated if tourniquet was used during collection      Protime-INR [[id number]]  (Normal) Collected:  11/03/19 1144    Lab Status:  Final result Specimen:  Blood from Arm, Left Updated:  11/03/19 1206     Protime 13 8 seconds      INR 1 10    APTT [[id number]]  (Normal) Collected:  11/03/19 1144    Lab Status:  Final result Specimen:  Blood from Arm, Left Updated:  11/03/19 1206     PTT 27 seconds CBC and differential [[id number]]  (Abnormal) Collected:  11/03/19 1144    Lab Status:  Final result Specimen:  Blood from Arm, Left Updated:  11/03/19 1156     WBC 16 12 Thousand/uL      RBC 4 22 Million/uL      Hemoglobin 12 0 g/dL      Hematocrit 37 9 %      MCV 90 fL      MCH 28 4 pg      MCHC 31 7 g/dL      RDW 12 7 %      MPV 9 5 fL      Platelets 155 Thousands/uL      nRBC 0 /100 WBCs      Neutrophils Relative 77 %      Immat GRANS % 1 %      Lymphocytes Relative 12 %      Monocytes Relative 8 %      Eosinophils Relative 1 %      Basophils Relative 1 %      Neutrophils Absolute 12 45 Thousands/µL      Immature Grans Absolute 0 10 Thousand/uL      Lymphocytes Absolute 1 94 Thousands/µL      Monocytes Absolute 1 35 Thousand/µL      Eosinophils Absolute 0 19 Thousand/µL      Basophils Absolute 0 09 Thousands/µL     Blood culture #1 [[id number]] Collected:  11/03/19 1144    Lab Status: In process Specimen:  Blood from Arm, Left Updated:  11/03/19 1153    Blood culture #2 [[id number]] Collected:  11/03/19 1144    Lab Status: In process Specimen:  Blood from Arm, Right Updated:  11/03/19 1153                 XR foot 3+ vw right   Final Result by Queenie Ye MD (11/03 1905)   Dorsal skin ulceration and subcutaneous edema consistent with cellulitis  No acute osseous abnormality  No radiographic evidence of osteomyelitis  Degenerative changes as described  Workstation performed: [id number]               Procedures  Procedures        ED Course  ED Course as of Nov 03 2026   Sun Nov 03, 2019   1206 WBC(!): 16 12           Identification of Seniors at Risk      Most Recent Value   (ISAR) Identification of Seniors at Risk   Before the illness or injury that brought you to the Emergency, did you need someone to help you on a regular basis?   1 Filed at: 11/03/2019 1057   In the last 24 hours, have you needed more help than usual?  1 Filed at: 11/03/2019 1057   Have you been hospitalized for one or more nights during the past 6 months? 0 Filed at: 11/03/2019 1057   In general, do you see well?  0 Filed at: 11/03/2019 1057   In general, do you have serious problems with your memory? 1 Filed at: 11/03/2019 1057   Do you take more than three different medications every day? 1 Filed at: 11/03/2019 1057   ISAR Score  4 Filed at: 11/03/2019 1057                          [facility]  Number of Diagnoses or Management Options  Cellulitis of right anterior lower leg: new and requires workup  Diabetic foot ulcer ([facility] ): new and requires workup  Diagnosis management comments: 61-year-old female presenting with cellulitis and right foot ulcer will obtain laboratories to evaluate degree of infection  Patient had elevated white blood cell, but no other acute abnormalities  Will admit patient to [facility] for IV antibiotics and podiatry evaluation         Amount and/or Complexity of Data Reviewed  Clinical lab tests: ordered and reviewed  Tests in the medicine section of CPT®: ordered and reviewed  Review and summarize past medical records: yes  Discuss the patient with other providers: yes        Disposition  Final diagnoses:   Di[facility] foot ulcer ([facility] )   Cellulitis of right anterior lower leg     Time reflects when diagnosis was documented in both MDM as applicable and the Disposition within this note     Time User Action Codes Description Comment    11/3/2019  1:02 PM Ruben Sesay Add [I98 [id number],  L97 509] Diabetic foot ulcer ([facility] )     11/3/2019  1:02 PM Ruben Later Add [Y11 [id number]] Cellulitis of right anterior lower leg     11/3/2019  1:02 PM Ruben Sesay Modify [[id number],  L97 509] Diabetic foot ulcer ([facility] )     11/3/2019  1:02 PM [address] [place] [address] 307 [L13 [id number]] Cellulitis of right anterior lower leg     11/3/2019  1:41 PM Katherine Mcgraw Add [[id number]] Cellulitis of right leg       ED Disposition     ED Disposition Condition Date/Time Comment    Admit Dangelo Olvera Nov 3, 2019  1:02 PM Case was discussed with SU and the patient's admission status was agreed to be Admission Status: inpatient status to the service of Dr [facility]          Follow-up Information    None         Current Discharge Medication List      CONTINUE these medications which have NOT CHANGED    Details   artificial tear (LUBRIFRESH P M ) 83-15 % ophthalmic ointment Administer to both eyes daily at bedtime  Qty: 1 Tube, Refills: 0    Associated Diagnoses: Frontotemporal dementia without behavioral disturbance (HCC)      calcium carbonate-vitamin D (OSCAL-D) 500 mg-200 units per tablet Take 1 tablet by mouth daily with breakfast  Qty: 30 tablet, Refills: 0    Associated Diagnoses: Frontotemporal dementia without behavioral disturbance (HCC)      docusate sodium (COLACE) 100 mg capsule Take 1 capsule (100 mg total) by mouth daily  Qty: 30 capsule, Refills: 0    Associated Diagnoses: Frontotemporal dementia without behavioral disturbance (HCC)      lisinopril (ZESTRIL) 5 mg tablet Take 1 tablet (5 mg total) by mouth daily  Qty: 30 tablet, Refills: 0    Associated Diagnoses: Benign essential hypertension      rivastigmine (EXELON) 4 5 MG capsule Take 1 capsule (4 5 mg total) by mouth daily  Qty: 30 capsule, Refills: 0    Associated Diagnoses: Frontotemporal dementia without behavioral disturbance (HCC)      acetaminophen (TYLENOL) 325 mg tablet Take 2 tablets (650 mg total) by mouth every 6 (six) hours as needed for mild pain, moderate pain, headaches or fever  Qty: 30 tablet, Refills: 0    Associated Diagnoses: Frontotemporal dementia without behavioral disturbance (HCC)      cyanocobalamin 1000 MCG tablet Take 1 tablet (1,000 mcg total) by mouth daily  Qty: 30 tablet, Refills: 0    Associated Diagnoses: Frontotemporal dementia without behavioral disturbance (HCC)      !! Nutritional Supplements (ENSURE ACTIVE HIGH PROTEIN) LIQD Take 8 oz by mouth 2 (two) times a day  Qty: 60 Can, Refills: 0    Associated Diagnoses: Frontotemporal dementia without behavioral disturbance (HCC)      !! Nutritional Supplements (ENSURE ACTIVE HIGH PROTEIN) LIQD Take 8 oz by mouth 2 (two) times a day  Qty: 60 Can, Refills: 3    Associated Diagnoses: Frontotemporal dementia without behavioral disturbance ([facility] )       ! ! - Potential duplicate medications found  Please discuss with provider  No discharge procedures on file  ED Provider  Attending physically available and evaluated Kavon Mancilla  REJI managed the patient along with the ED Attending      Electronically Signed by         Madiha Mcdonald MD  11/03/19 2026

## 2019-11-03 NOTE — ED ATTENDING ATTESTATION
11/3/2019  ILex MD, saw and evaluated the patient  I have discussed the patient with the resident and agree with the resident's findings, Plan of Care, and MDM as documented in the resident's note, unless otherwise documented below  All available labs and Radiology studies were reviewed by myself  I was present for key portions of any procedure(s) performed by the resident and I was immediately available to provide assistance  I agree with the current assessment done in the Emergency Department  I have conducted an independent evaluation of this patient  Briefly, this is a 66 y o  female presenting with frontotemporal dementia, diabetes mellitus, hypertension, hyperlipidemia, presenting with a left leg infected ulcer  Patient has an ulcer to her left heel  Today, there is progressive erythema and swelling of the left lower extremity and calf  Patient is unable to contribute significantly to history of present illness due to underlying dementia  She denies pain in her left leg  She denies a fever  Physical Exam  Vitals:    11/03/19 1057 11/03/19 1200   BP: 153/65 138/57   TempSrc: Oral    Pulse: 96 88   Resp: 18 18   Patient Position - Orthostatic VS: Sitting    Temp: 97 5 °F (36 4 °C)        Constitutional:  Awake, alert, oriented to place and self only  No acute distress  HEENT:  Normocephalic, atraumatic  Sclera anicteric, conjunctiva not injected  Moist oral mucosa  Cardiac:  Regular rate and rhythm, no murmurs, rubs, or gallops  2+ radial pulses  Respiratory:  Lungs are clear to auscultation bilaterally, no wheezes or rales  Abdomen:  Nondistended  Bowel sounds present  No tenderness to palpation  No rebound or guarding  There are no sacral ulcers noted  Extremities: There is a 5 cm wound to right heel with a blackened eschar, there is spreading erythema that goes up to proximal leg, warm, but without bullae or crepitus    There is no pain out of proportion exam  Left lower extremity is without heel ulcer  Integument:  No rashes or exposed areas, cap refill less than 2 seconds  Neurologic:  Awake, alert, and oriented to self and place    Nonfocal exam   Psychiatric:  Normal affect, very pleasant    ED Course  No orders to display     Medications   sodium chloride (PF) 0 9 % injection 3 mL (has no administration in time range)   vancomycin (VANCOCIN) IVPB (premix) 750 mg (has no administration in time range)   cefepime (MAXIPIME) 2 g/50 mL dextrose IVPB (2,000 mg Intravenous New Bag 11/3/19 1234)     Labs Reviewed   CBC AND DIFFERENTIAL - Abnormal       Result Value Ref Range Status    WBC 16 12 (*) 4 31 - 10 16 Thousand/uL Final    RBC 4 22  3 81 - 5 12 Million/uL Final    Hemoglobin 12 0  11 5 - 15 4 g/dL Final    Hematocrit 37 9  34 8 - 46 1 % Final    MCV 90  82 - 98 fL Final    MCH 28 4  26 8 - 34 3 pg Final    MCHC 31 7  31 4 - 37 4 g/dL Final    RDW 12 7  11 6 - 15 1 % Final    MPV 9 5  8 9 - 12 7 fL Final    Platelets 724 (*) 184 - 390 Thousands/uL Final    nRBC 0  /100 WBCs Final    Neutrophils Relative 77 (*) 43 - 75 % Final    Immat GRANS % 1  0 - 2 % Final    Lymphocytes Relative 12 (*) 14 - 44 % Final    Monocytes Relative 8  4 - 12 % Final    Eosinophils Relative 1  0 - 6 % Final    Basophils Relative 1  0 - 1 % Final    Neutrophils Absolute 12 45 (*) 1 85 - 7 62 Thousands/µL Final    Immature Grans Absolute 0 10  0 00 - 0 20 Thousand/uL Final    Lymphocytes Absolute 1 94  0 60 - 4 47 Thousands/µL Final    Monocytes Absolute 1 35 (*) 0 17 - 1 22 Thousand/µL Final    Eosinophils Absolute 0 19  0 00 - 0 61 Thousand/µL Final    Basophils Absolute 0 09  0 00 - 0 10 Thousands/µL Final   COMPREHENSIVE METABOLIC PANEL - Abnormal    Sodium 138  136 - 145 mmol/L Final    Potassium 3 7  3 5 - 5 3 mmol/L Final    Chloride 105  100 - 108 mmol/L Final    CO2 27  21 - 32 mmol/L Final    ANION GAP 6  4 - 13 mmol/L Final    BUN 17  5 - 25 mg/dL Final    Creatinine 0 60  0 60 - 1 30 mg/dL Final    Comment: Standardized to IDMS reference method    Glucose 95  65 - 140 mg/dL Final    Comment:   If the patient is fasting, the ADA then defines impaired fasting glucose as > 100 mg/dL and diabetes as > or equal to 123 mg/dL  Specimen collection should occur prior to Sulfasalazine administration due to the potential for falsely depressed results  Specimen collection should occur prior to Sulfapyridine administration due to the potential for falsely elevated results  Calcium 9 3  8 3 - 10 1 mg/dL Final    AST 22  5 - 45 U/L Final    Comment:   Specimen collection should occur prior to Sulfasalazine administration due to the potential for falsely depressed results  ALT 31  12 - 78 U/L Final    Comment:   Specimen collection should occur prior to Sulfasalazine and/or Sulfapyridine administration due to the potential for falsely depressed results  Alkaline Phosphatase 120 (*) 46 - 116 U/L Final    Total Protein 7 3  6 4 - 8 2 g/dL Final    Albumin 2 9 (*) 3 5 - 5 0 g/dL Final    Total Bilirubin 0 44  0 20 - 1 00 mg/dL Final    eGFR 89  ml/min/1 73sq m Final    Narrative:     [place] guidelines for Chronic Kidney Disease (CKD):     Stage 1 with normal or high GFR (GFR > 90 mL/min/1 73 square meters)    Stage 2 Mild CKD (GFR = 60-89 mL/min/1 73 square meters)    Stage 3A Moderate CKD (GFR = 45-59 mL/min/1 73 square meters)    Stage 3B Moderate CKD (GFR = 30-44 mL/min/1 73 square meters)    Stage 4 Severe CKD (GFR = 15-29 mL/min/1 73 square meters)    Stage 5 End Stage CKD (GFR <15 mL/min/1 73 square meters)  Note: GFR calculation is accurate only with a steady state creatinine   LACTIC ACID, PLASMA - Normal    LACTIC ACID 1 6  0 5 - 2 0 mmol/L Final    Narrative:     Result may be elevated if tourniquet was used during collection     PROTIME-INR - Normal    Protime 13 8  11 6 - 14 5 seconds Final    INR 1 10  0 84 - 1 19 Final   APTT - Normal    PTT 27  23 - 37 seconds Final    Comment: Therapeutic Heparin Range =  60-90 seconds   PROCALCITONIN TEST - Normal    Procalcitonin <0 05  <=0 25 ng/ml Final   BLOOD CULTURE   BLOOD CULTURE   LACTIC ACID, PLASMA     57-year-old female presenting with right foot ulcer and associated skin/soft tissue infection of right leg up to proximal leg  Lower index of suspicion for CMP is within intact renal function, lactate is 1 6 and normal, white blood cell count is elevated at 16 12 with neutrophil predominance and a thrombocytosis of 505  Procalcitonin at this time is not consistent with disseminated bacterial infection  I anticipate this will be trended on the patient basis  Blood cultures obtained and sent  Given the patient is a resident of a nursing home, will start patient on broad-spectrum coverage including with cefepime and vancomycin  Patient admitted to [facility] for IV antibiotics and further cares         Clinical Impression  Final diagnoses:   Diabetic foot ulcer ([address] )   Cellulitis of right anterior lower leg

## 2019-11-03 NOTE — H&P
H&P- Matheus Roberson 1945, 66 y o  female MRN: [id number]    Unit/Bed#: ED 27 Encounter: [id number]    Primary Care Provider: Genaro Chester MD   Date and time admitted to hospital: 11/3/2019 10:54 AM        * Cellulitis of right leg  Assessment & Plan  Patient presents to the ED with left leg cellulitis and right heel wound  Cellulitis is almost up to the knee, marked to see progression  She has a white blood cell count of 16, afebrile  Lactic acid 1 6  Does not meet sepsis criteria  In the ED she received Vanco and cefepime, which we will continue due to patient frequent hospitalization and coming from a nursing home  X-ray of the right foot ordered  Podiatry consult placed  Tylenol for pain    Frontotemporal dementia without behavioral disturbance (HCC)  Assessment & Plan  Continue rivastigmine  Fall precautions    Essential hypertension  Assessment & Plan  Blood pressure stable at 138/57  Continue lisinopril      VTE Prophylaxis: Heparin  / sequential compression device   Code Status:  Full code  POLST: There is no POLST form on file for this patient (pre-hospital)  Discussion with family:  Will reach out to family    Anticipated Length of Stay:  Patient will be admitted on an Inpatient basis with an anticipated length of stay of  more than 2 midnights  Justification for Hospital Stay:  Right leg cellulitis with a wound    Total Time for Visit, including Counseling / Coordination of Care: 45 minutes  Greater than 50% of this total time spent on direct patient counseling and coordination of care  Chief Complaint:   Right leg cellulitis    History of Present Illness:    Matheus Roberson is a 66 y o  female who presents with right leg cellulitis  Patient is a 53-year-old female with a past medical history of hypertension, impaired fasting glucose, frontotemporal dementia, hypercholesterolemia, arthritis    She presented from [facility] for right leg cellulitis, patient due to dementia is a very poor historian  But she could tell me that she has pain in the right leg  Otherwise she denies any issues  Patient was recently admitted on 10/18/2019 after a fall diagnosed with acute pancreatitis  Discharge back to a locked dementia unit on 10/20/2019  I tried to reach out to the facility for further informations, nobody is picking up the phone  ED course:  She was found to have a white blood cell count of 16, lactic acid 1 6, afebrile, did not meet sepsis criteria  Admit for cellulitis and need for IV antibiotics    Review of Systems:    Review of Systems unable to obtain due to dementia    Past Medical and Surgical History:     Past Medical History:   Diagnosis Date    [facility]     [facility]     Expressive aphasia     Hyperlipidemia     Hypertension     Impaired fasting glucose     Osteoarthritis     Vitamin D deficiency        Past Surgical History:   Procedure Laterality Date    JOINT REPLACEMENT      right knee       Meds/Allergies:    Prior to Admission medications    Medication Sig Start Date End Date Taking?  Authorizing Provider   artificial tear (LUBRIFRESH P M ) 83-15 % ophthalmic ointment Administer to both eyes daily at bedtime 5/3/19  Yes MD Edwar   calcium carbonate-vitamin D (OSCAL-D) 500 mg-200 units per tablet Take 1 tablet by mouth daily with breakfast 5/4/19  Yes MD Edwar   docusate sodium (COLACE) 100 mg capsule Take 1 capsule (100 mg total) by mouth daily 5/3/19  Yes MD Edwar   lisinopril (ZESTRIL) 5 mg tablet Take 1 tablet (5 mg total) by mouth daily 5/3/19 5/2/20 Yes MD Edwar   rivastigmine (EXELON) 4 5 MG capsule Take 1 capsule (4 5 mg total) by mouth daily 5/3/19  Yes MD Edwar   acetaminophen (TYLENOL) 325 mg tablet Take 2 tablets (650 mg total) by mouth every 6 (six) hours as needed for mild pain, moderate pain, headaches or fever 5/3/19   MD Edwar   cyanocobalamin 1000 MCG tablet Take 1 tablet (1,000 mcg total) by mouth daily 5/3/19   Melba Javier MD   Nutritional Supplements (ENSURE ACTIVE HIGH PROTEIN) LIQD Take 8 oz by mouth 2 (two) times a day  Patient taking differently: Take 8 oz by mouth daily  5/3/19   Mleba Javier MD   Nutritional Supplements (ENSURE ACTIVE HIGH PROTEIN) LIQD Take 8 oz by mouth 2 (two) times a day  Patient not taking: Reported on 10/18/2019 7/24/19   Harriet Farris MD     I have reveiwed home medications using records provided by   Allergies: No Known Allergies    Social History:     Marital Status:    Occupation:   Patient Pre-hospital Living Situation:   Patient Pre-hospital Level of Mobility:   Patient Pre-hospital Diet Restrictions:   Substance Use History:   Social History     Substance and Sexual Activity   Alcohol Use Never    Frequency: Never     Social History     Tobacco Use   Smoking Status Never Smoker   Smokeless Tobacco Never Used     Social History     Substance and Sexual Activity   Drug Use Never       Family History:    History reviewed  No pertinent family history  Physical Exam:     Vitals:   Blood Pressure: 138/57 (11/03/19 1200)  Pulse: 88 (11/03/19 1200)  Temperature: 97 5 °F (36 4 °C) (11/03/19 1057)  Temp Source: Oral (11/03/19 1057)  Respirations: 18 (11/03/19 1200)  SpO2: 99 % (11/03/19 1200)    Physical Exam   Constitutional: She appears well-developed and well-nourished  No distress  HENT:   Head: Normocephalic and atraumatic  Neck: Normal range of motion  Neck supple  Cardiovascular: Normal rate, regular rhythm and normal heart sounds  Exam reveals no gallop and no friction rub  No murmur heard  Pulmonary/Chest: Effort normal and breath sounds normal  No respiratory distress  She has no wheezes  Abdominal: Soft  Bowel sounds are normal  She exhibits no distension  There is no tenderness  Musculoskeletal:   Dry wound on the right heel, redness swelling and warmth of the right foot up to 2/3 of the calf   Neurological: She is alert  Not oriented, exam nonfocal   Skin:   Wound on the right heel, with redness, swelling and warmth up to 2/3 of the calf   Psychiatric:   Unable to assess       Additional Data:     Lab Results: I have personally reviewed pertinent reports  Results from last 7 days   Lab Units 11/03/19  1144   WBC Thousand/uL 16 12*   HEMOGLOBIN g/dL 12 0   HEMATOCRIT % 37 9   PLATELETS Thousands/uL 505*   NEUTROS PCT % 77*   LYMPHS PCT % 12*   MONOS PCT % 8   EOS PCT % 1     Results from last 7 days   Lab Units 11/03/19  1144   SODIUM mmol/L 138   POTASSIUM mmol/L 3 7   CHLORIDE mmol/L 105   CO2 mmol/L 27   BUN mg/dL 17   CREATININE mg/dL 0 60   ANION GAP mmol/L 6   CALCIUM mg/dL 9 3   ALBUMIN g/dL 2 9*   TOTAL BILIRUBIN mg/dL 0 44   ALK PHOS U/L 120*   ALT U/L 31   AST U/L 22   GLUCOSE RANDOM mg/dL 95     Results from last 7 days   Lab Units 11/03/19  1144   INR  1 10             Results from last 7 days   Lab Units 11/03/19  1144   LACTIC ACID mmol/L 1 6   PROCALCITONIN ng/ml <0 05       Imaging: I have personally reviewed pertinent reports  XR foot 3+ vw right    (Results Pending)       EKG, Pathology, and Other Studies Reviewed on Admission:   · EKG:  Not done    Allscripts / Epic Records Reviewed: Yes     ** Please Note: This note has been constructed using a voice recognition system   **

## 2019-11-03 NOTE — ASSESSMENT & PLAN NOTE
Patient presents to the ED with left leg cellulitis and right heel wound  Cellulitis is almost up to the knee, marked to see progression  She has a white blood cell count of 16, afebrile  Lactic acid 1 6   Does not meet sepsis criteria  In the ED she received Vanco and cefepime, which we will continue due to patient frequent hospitalization and coming from a nursing home  X-ray of the right foot ordered  Podiatry consult placed  Tylenol for pain

## 2019-11-03 NOTE — PROGRESS NOTES
Vancomycin IV Pharmacy-to-Dose Consultation    Kary Toscano is a 66 y o  female who is currently receiving Vancomycin IV with management by the Pharmacy Consult service  Relevant clinical data and objective history reviewed:  Creatinine   Date Value Ref Range Status   11/03/2019 0 60 0 60 - 1 30 mg/dL Final     Comment:     Standardized to IDMS reference method   10/19/2019 0 55 (L) 0 60 - 1 30 mg/dL Final     Comment:     Standardized to IDMS reference method   10/18/2019 0 74 0 60 - 1 30 mg/dL Final     Comment:     Standardized to IDMS reference method     /57   Pulse 88   Temp 97 5 °F (36 4 °C) (Oral)   Resp 18   SpO2 99%   No intake/output data recorded  Lab Results   Component Value Date/Time    BUN 17 11/03/2019 11:44 AM    WBC 16 12 (H) 11/03/2019 11:44 AM    HGB 12 0 11/03/2019 11:44 AM    HCT 37 9 11/03/2019 11:44 AM    MCV 90 11/03/2019 11:44 AM     (H) 11/03/2019 11:44 AM     Temp Readings from Last 3 Encounters:   11/03/19 97 5 °F (36 4 °C) (Oral)   10/22/19 98 7 °F (37 1 °C) (Oral)   10/20/19 98 1 °F (36 7 °C)     Vancomycin Assessment:  Indication: cellulitis  Status: stable  Cultures:  11/3 blood cultures x 2: in process  Procalcitonin:   11/3: <0 05  Renal Function: SCr on admission is 0 6; baseline appears to be 0 5-0 7  Potential Nephrotoxicity Factors:  Medications: none identified  Patient Factors: elderly  Days of Therapy: 1  Current Dose: 750 mg x 1 in the ED  Goal Trough: 15-20  Last Level: n/a     Vancomycin Plan:  Dosing: Will continue with 750 mg q12h  Next Level: Will check a stead state level on 11/5 @ 1230, prior to 5th dose  Renal Function Monitoring: Will follow UOP and SCr     Pharmacy will continue to follow closely for s/sx of nephrotoxicity, infusion reactions and appropriateness of therapy  BMP and CBC will be ordered per protocol  We will continue to follow the patients culture results and clinical progress daily      Madison Maria, PharmD, [address] [facility] Medicine Clinical Pharmacist  [phone] or via Radha

## 2019-11-04 PROBLEM — D72.829 LEUKOCYTOSIS: Status: ACTIVE | Noted: 2019-11-04

## 2019-11-04 LAB
ANION GAP SERPL CALCULATED.3IONS-SCNC: 5 MMOL/L (ref 4–13)
BASOPHILS # BLD AUTO: 0.07 THOUSANDS/ΜL (ref 0–0.1)
BASOPHILS NFR BLD AUTO: 1 % (ref 0–1)
BUN SERPL-MCNC: 15 MG/DL (ref 5–25)
CALCIUM SERPL-MCNC: 9 MG/DL (ref 8.3–10.1)
CHLORIDE SERPL-SCNC: 110 MMOL/L (ref 100–108)
CO2 SERPL-SCNC: 26 MMOL/L (ref 21–32)
CREAT SERPL-MCNC: 0.6 MG/DL (ref 0.6–1.3)
EOSINOPHIL # BLD AUTO: 0.35 THOUSAND/ΜL (ref 0–0.61)
EOSINOPHIL NFR BLD AUTO: 3 % (ref 0–6)
ERYTHROCYTE [DISTWIDTH] IN BLOOD BY AUTOMATED COUNT: 12.5 % (ref 11.6–15.1)
GFR SERPL CREATININE-BSD FRML MDRD: 89 ML/MIN/1.73SQ M
GLUCOSE SERPL-MCNC: 97 MG/DL (ref 65–140)
HCT VFR BLD AUTO: 34.5 % (ref 34.8–46.1)
HGB BLD-MCNC: 11.1 G/DL (ref 11.5–15.4)
IMM GRANULOCYTES # BLD AUTO: 0.1 THOUSAND/UL (ref 0–0.2)
IMM GRANULOCYTES NFR BLD AUTO: 1 % (ref 0–2)
LYMPHOCYTES # BLD AUTO: 1.95 THOUSANDS/ΜL (ref 0.6–4.47)
LYMPHOCYTES NFR BLD AUTO: 17 % (ref 14–44)
MAGNESIUM SERPL-MCNC: 2.1 MG/DL (ref 1.6–2.6)
MCH RBC QN AUTO: 28.5 PG (ref 26.8–34.3)
MCHC RBC AUTO-ENTMCNC: 32.2 G/DL (ref 31.4–37.4)
MCV RBC AUTO: 89 FL (ref 82–98)
MONOCYTES # BLD AUTO: 0.93 THOUSAND/ΜL (ref 0.17–1.22)
MONOCYTES NFR BLD AUTO: 8 % (ref 4–12)
MRSA NOSE QL CULT: NORMAL
NEUTROPHILS # BLD AUTO: 8.23 THOUSANDS/ΜL (ref 1.85–7.62)
NEUTS SEG NFR BLD AUTO: 70 % (ref 43–75)
NRBC BLD AUTO-RTO: 0 /100 WBCS
PLATELET # BLD AUTO: 436 THOUSANDS/UL (ref 149–390)
PMV BLD AUTO: 9.4 FL (ref 8.9–12.7)
POTASSIUM SERPL-SCNC: 4.3 MMOL/L (ref 3.5–5.3)
RBC # BLD AUTO: 3.9 MILLION/UL (ref 3.81–5.12)
SODIUM SERPL-SCNC: 141 MMOL/L (ref 136–145)
WBC # BLD AUTO: 11.63 THOUSAND/UL (ref 4.31–10.16)

## 2019-11-04 PROCEDURE — 87070 CULTURE OTHR SPECIMN AEROBIC: CPT | Performed by: STUDENT IN AN ORGANIZED HEALTH CARE EDUCATION/TRAINING PROGRAM

## 2019-11-04 PROCEDURE — 85025 COMPLETE CBC W/AUTO DIFF WBC: CPT | Performed by: INTERNAL MEDICINE

## 2019-11-04 PROCEDURE — 99232 SBSQ HOSP IP/OBS MODERATE 35: CPT | Performed by: PHYSICIAN ASSISTANT

## 2019-11-04 PROCEDURE — 83735 ASSAY OF MAGNESIUM: CPT | Performed by: INTERNAL MEDICINE

## 2019-11-04 PROCEDURE — 87147 CULTURE TYPE IMMUNOLOGIC: CPT | Performed by: STUDENT IN AN ORGANIZED HEALTH CARE EDUCATION/TRAINING PROGRAM

## 2019-11-04 PROCEDURE — 87186 SC STD MICRODIL/AGAR DIL: CPT | Performed by: STUDENT IN AN ORGANIZED HEALTH CARE EDUCATION/TRAINING PROGRAM

## 2019-11-04 PROCEDURE — 99222 1ST HOSP IP/OBS MODERATE 55: CPT | Performed by: PODIATRIST

## 2019-11-04 PROCEDURE — 87205 SMEAR GRAM STAIN: CPT | Performed by: STUDENT IN AN ORGANIZED HEALTH CARE EDUCATION/TRAINING PROGRAM

## 2019-11-04 PROCEDURE — 80048 BASIC METABOLIC PNL TOTAL CA: CPT | Performed by: INTERNAL MEDICINE

## 2019-11-04 RX ADMIN — HEPARIN SODIUM 5000 UNITS: 5000 INJECTION INTRAVENOUS; SUBCUTANEOUS at 21:49

## 2019-11-04 RX ADMIN — ACETAMINOPHEN 650 MG: 325 TABLET ORAL at 00:20

## 2019-11-04 RX ADMIN — RIVASTIGMINE TARTRATE 4.5 MG: 3 CAPSULE ORAL at 08:18

## 2019-11-04 RX ADMIN — ACETAMINOPHEN 650 MG: 325 TABLET ORAL at 11:38

## 2019-11-04 RX ADMIN — Medication 8 OZ: at 09:59

## 2019-11-04 RX ADMIN — WHITE PETROLATUM 57.7 %-MINERAL OIL 31.9 % EYE OINTMENT: at 21:48

## 2019-11-04 RX ADMIN — ACETAMINOPHEN 650 MG: 325 TABLET ORAL at 05:35

## 2019-11-04 RX ADMIN — VANCOMYCIN HYDROCHLORIDE 750 MG: 750 INJECTION, SOLUTION INTRAVENOUS at 00:59

## 2019-11-04 RX ADMIN — HEPARIN SODIUM 5000 UNITS: 5000 INJECTION INTRAVENOUS; SUBCUTANEOUS at 05:35

## 2019-11-04 RX ADMIN — DOCUSATE SODIUM 100 MG: 100 CAPSULE, LIQUID FILLED ORAL at 08:17

## 2019-11-04 RX ADMIN — HEPARIN SODIUM 5000 UNITS: 5000 INJECTION INTRAVENOUS; SUBCUTANEOUS at 13:38

## 2019-11-04 RX ADMIN — CYANOCOBALAMIN TAB 500 MCG 1000 MCG: 500 TAB at 08:17

## 2019-11-04 RX ADMIN — CEFEPIME HYDROCHLORIDE 2000 MG: 2 INJECTION, POWDER, FOR SOLUTION INTRAVENOUS at 02:50

## 2019-11-04 RX ADMIN — VANCOMYCIN HYDROCHLORIDE 750 MG: 750 INJECTION, SOLUTION INTRAVENOUS at 13:38

## 2019-11-04 RX ADMIN — ACETAMINOPHEN 650 MG: 325 TABLET ORAL at 17:10

## 2019-11-04 RX ADMIN — LISINOPRIL 5 MG: 5 TABLET ORAL at 08:17

## 2019-11-04 RX ADMIN — Medication 1 TABLET: at 08:17

## 2019-11-04 NOTE — PROGRESS NOTES
Progress Note - Vero Hassan 1945, 66 y o  female MRN: [id number]    Unit/Bed#: -01 Encounter: [id number]    Primary Care Provider: Carie Valentino MD   Date and time admitted to hospital: 11/3/2019 10:54 AM        * Cellulitis of right leg  Assessment & Plan  · Patient presented to the ED on 11/3/19 with right leg cellulitis and right heel wound  · Extent of cellulitis was almost up to the knee and was marked to see progression - has improved based on admission markings  · Podiatry consult appreciated  De-escalate IV abx  Discontinue cefepime  Continue IV Vanco pending MRSA swab and culture result  · Right foot XR revealed dorsal skin ulceration and subcutaneous edema consistent with cellulitis with no acute osseous abnormality or radiographic evidence of osteomyelitis per the result report  · Follow up blister cultures which were sent off by Podiatry today    Leukocytosis  Assessment & Plan  · Likely secondary to the above  Improving  · IV abx as above  · Follow up blood and blister cultures    Frontotemporal dementia without behavioral disturbance (HCC)  Assessment & Plan  · Continue rivastigmine  · Fall precautions  · Patient comes from locked dementia unit  · I discussed with  today who advised PT/OT consults for discharge planning     Essential hypertension  Assessment & Plan  · Blood pressure stable  · Continue lisinopril        VTE Pharmacologic Prophylaxis:   Pharmacologic: Heparin  Mechanical VTE Prophylaxis in Place: Yes    Patient Centered Rounds: I have performed bedside rounds with nursing staff today  Discussions with Specialists or Other Care Team Provider: Podiatry and      Education and Discussions with Family / Patient: patient and her daughter over the phone    Time Spent for Care: 30 minutes  More than 50% of total time spent on counseling and coordination of care as described above      Current Length of Stay: 1 day(s)    Current Patient Status: Inpatient   Certification Statement: The patient will continue to require additional inpatient hospital stay due to continued IV abx, cultures    Discharge Plan: not yet medically stable for d/c  Pending wound and blood cultures  PT/OT consulted  Code Status: Level 1 - Full Code      Subjective:   Ms Papo Smith denies any complaint but has dementia  She states "they cut my foot" in reference to Podiatry taking blister culture  Objective:     Vitals:   Temp (24hrs), Av 9 °F (36 6 °C), Min:97 5 °F (36 4 °C), Max:98 1 °F (36 7 °C)    Temp:  [97 5 °F (36 4 °C)-98 1 °F (36 7 °C)] 98 1 °F (36 7 °C)  HR:  [] 82  Resp:  [18] 18  BP: (126-141)/(58-61) 141/58  SpO2:  [93 %-99 %] 97 %  Body mass index is 25 2 kg/m²  Input and Output Summary (last 24 hours): Intake/Output Summary (Last 24 hours) at 2019 1401  Last data filed at 2019 0113  Gross per 24 hour   Intake 120 ml   Output 430 ml   Net -310 ml       Physical Exam:     Physical Exam   Constitutional: No distress  Patient seen sitting upright comfortably resting in bedside chair eating lunch earlier today  Pleasant and cooperative   Cardiovascular: Normal rate and regular rhythm  Pulmonary/Chest: Effort normal and breath sounds normal  No respiratory distress  She has no wheezes  Abdominal: Soft  Bowel sounds are normal  There is no tenderness  Musculoskeletal: She exhibits no edema  Neurological:   Alert and oriented to self only  This is baseline when describe to patient's daughter and floor nurse   Skin: Skin is warm  She is not diaphoretic  Erythema improved from admission skin marker  Right foot wrapped  See Podiatry note for clinical images   Psychiatric: She has a normal mood and affect  Her behavior is normal    Vitals reviewed        Additional Data:     Labs:    Results from last 7 days   Lab Units 19  0501   WBC Thousand/uL 11 63*   HEMOGLOBIN g/dL 11 1*   HEMATOCRIT % 34 5*   PLATELETS Thousands/uL 436* NEUTROS PCT % 70   LYMPHS PCT % 17   MONOS PCT % 8   EOS PCT % 3     Results from last 7 days   Lab Units 11/04/19  0501 11/03/19  1144   SODIUM mmol/L 141 138   POTASSIUM mmol/L 4 3 3 7   CHLORIDE mmol/L 110* 105   CO2 mmol/L 26 27   BUN mg/dL 15 17   CREATININE mg/dL 0 60 0 60   ANION GAP mmol/L 5 6   CALCIUM mg/dL 9 0 9 3   ALBUMIN g/dL  --  2 9*   TOTAL BILIRUBIN mg/dL  --  0 44   ALK PHOS U/L  --  120*   ALT U/L  --  31   AST U/L  --  22   GLUCOSE RANDOM mg/dL 97 95     Results from last 7 days   Lab Units 11/03/19  1144   INR  1 10             Results from last 7 days   Lab Units 11/03/19  1144   LACTIC ACID mmol/L 1 6   PROCALCITONIN ng/ml <0 05           * I Have Reviewed All Lab Data Listed Above  * Additional Pertinent Lab Tests Reviewed: All Labs Within Last 24 Hours Reviewed    Imaging:    Imaging Reports Reviewed Today Include: XR as above  Imaging Personally Reviewed by Myself Includes:  None    Recent Cultures (last 7 days):           Last 24 Hours Medication List:     Current Facility-Administered Medications:  acetaminophen 650 mg Oral Q6H Korina Floyd MD    artificial tear  Both Eyes HS Neto Ellis MD    calcium carbonate-vitamin D 1 tablet Oral Daily With Breakfast Neto Ellis MD    cyanocobalamin 1,000 mcg Oral Daily Katherine Mcgraw MD    docusate sodium 100 mg Oral Daily Katherine Mcgraw MD    ENSURE ACTIVE HIGH PROTEIN 8 oz Oral Daily Neto Ellis MD    heparin (porcine) 5,000 Units Subcutaneous Q8H [facility] Neto Ellis MD    lisinopril 5 mg Oral Daily Neto Ellis MD    rivastigmine 4 5 mg Oral Daily Neto Ellis MD    sodium chloride (PF) 3 mL Intravenous PRN Neto Ellis MD    vancomycin 12 5 mg/kg Intravenous Q12H Neto Ellis MD Last Rate: 750 mg (11/04/19 1338)        Today, Patient Was Seen By: Zohaib Villegas PA-C    ** Please Note: Dictation voice to text software may have been used in the creation of this document  **

## 2019-11-04 NOTE — PLAN OF CARE
Problem: Prexisting or High Potential for Compromised Skin Integrity  Goal: Skin integrity is maintained or improved  Description  INTERVENTIONS:  - Identify patients at risk for skin breakdown  - Assess and monitor skin integrity  - Assess and monitor nutrition and hydration status  - Monitor labs   - Assess for incontinence   - Turn and reposition patient  - Assist with mobility/ambulation  - Relieve pressure over bony prominences  - Avoid friction and shearing  - Provide appropriate hygiene as needed including keeping skin clean and dry  - Evaluate need for skin moisturizer/barrier cream  - Collaborate with interdisciplinary team   - Patient/family teaching  - Consider wound care consult   Outcome: Progressing     Problem: Potential for Falls  Goal: Patient will remain free of falls  Description  INTERVENTIONS:  - Assess patient frequently for physical needs  -  Identify cognitive and physical deficits and behaviors that affect risk of falls    -  [place] fall precautions as indicated by assessment   - Educate patient/family on patient safety including physical limitations  - Instruct patient to call for assistance with activity based on assessment  - Modify environment to reduce risk of injury  - Consider OT/PT consult to assist with strengthening/mobility  Outcome: Progressing

## 2019-11-04 NOTE — ASSESSMENT & PLAN NOTE
· Patient presented to the ED on 11/3/19 with right leg cellulitis and right heel wound  · Extent of cellulitis was almost up to the knee and was marked to see progression - has improved based on admission markings  · Podiatry consult appreciated  De-escalate IV abx  Discontinue cefepime   Continue IV Vanco pending MRSA swab and culture result  · Right foot XR revealed dorsal skin ulceration and subcutaneous edema consistent with cellulitis with no acute osseous abnormality or radiographic evidence of osteomyelitis per the result report  · Follow up blister cultures which were sent off by Podiatry today

## 2019-11-04 NOTE — SOCIAL WORK
Pt charted as confused  CM contacted pt's dtr Manny Lauren [phone]  CM discussed CM role in dcp  Dtr reported that pt is current with [facility] and resides in the locked unit for dementia  Dtr confirmed that she wants pt to return to facility  CM spoke with [facility]  Nurse reported pt is assist x1 with ADL's, locked unit, ambulates with cane  Pt can return when medically stable  Facility requested therapy notes prior to d/c      CM reviewed d/c planning process including the following: identifying help at home, patient preference for d/c planning needs, Discharge Lounge, [facility] Meds to Bed program, availability of treatment team to discuss questions or concerns patient and/or family may have regarding understanding medications and recognizing signs and symptoms once discharged  CM also encouraged patient to follow up with all recommended appointments after discharge  Patient advised of importance for patient and family to participate in managing patients medical well being

## 2019-11-04 NOTE — CONSULTS
Consult - Rebecca Lopes 66 y o  female MRN: [id number]  Unit/Bed#: -01 Encounter: [id number]    Assessment/Plan     Assessment:  66 y o  female with dementia, diabetes presenting with right lower extremity cellulitis and unstageable heel pressure ulceration    Plan:  - drained blister noted lateral to heel pressure eschar was further deroofed, with some minimal noted purulence underneath which was subsequently cultured  Purulence was not noted around/under eschar itself  Will follow up on wound culture data  Fortunately patient's leukocytosis is down trending and she is currently afebrile  - continue with antibiotics per primary and follow-up pending wound culture data  - plan to continue with local wound care (Betadine soaked gauze daily by nursing) and offloading of right heel with Prevalon boot at all times  Fortunately right foot x-ray is negative for signs of osteomyelitis thus no required surgical intervention at this time  - right foot x-ray reviewed; no soft tissue emphysema or signs of calcaneal osteomyelitis  - If patient is ambulatory, will need offloading heel shoe  - will discuss plan with attending and updated as needed    History of Present Illness     HPI:  Corie Eastman is a 66 y o  female with PMH significant for diabetes, HTN, dementia who presents as consultation for right lower extremity cellulitis and right heel wound  Patient presented from facility ([facility] yesterday to the emergency department due to cellulitis and right heel wound  Patient is poor historian and does not know how long she has had wound nor the right leg cellulitis although does endorse some mild pain to the right leg  In the emergency department she was found to have a white blood cell count of 16, lactic acid of 1 6 however she was fortunately afebrile and just did not need the sepsis criteria  She is admitted to hospitalist service for IV antibiotics            Inpatient consult to Podiatry     Performed by  Brandan Grant DPM     Authorized by Oneida Ganser, MD            Review of Systems   Constitutional: Negative  HENT: Negative  Eyes: Negative  Respiratory: Negative  Cardiovascular: Negative  Gastrointestinal: Negative  Musculoskeletal: Negative   Skin: RLE cellulitis and heel ulceration   Neurological: Negative  Psych: negative  Historical Information   Past Medical History:   Diagnosis Date    [facility] [address] [facility]     [facility]     Expressive aphasia     Hyperlipidemia     Hypertension     Impaired fasting glucose     Osteoarthritis     Vitamin D deficiency      Past Surgical History:   Procedure Laterality Date    JOINT REPLACEMENT      right knee     Social History   Social History     Substance and Sexual Activity   Alcohol Use Never    Frequency: Never     Social History     Substance and Sexual Activity   Drug Use Never     Social History     Tobacco Use   Smoking Status Never Smoker   Smokeless Tobacco Never Used     Family History: History reviewed  No pertinent family history      Meds/Allergies   Medications Prior to Admission   Medication    artificial tear (LUBRIFRESH P M ) 83-15 % ophthalmic ointment    calcium carbonate-vitamin D (OSCAL-D) 500 mg-200 units per tablet    docusate sodium (COLACE) 100 mg capsule    lisinopril (ZESTRIL) 5 mg tablet    rivastigmine (EXELON) 4 5 MG capsule    acetaminophen (TYLENOL) 325 mg tablet    cyanocobalamin 1000 MCG tablet    Nutritional Supplements (ENSURE ACTIVE HIGH PROTEIN) LIQD    Nutritional Supplements (ENSURE ACTIVE HIGH PROTEIN) LIQD     No Known Allergies    Objective   First Vitals:   Blood Pressure: 153/65 (11/03/19 1057)  Pulse: 96 (11/03/19 1057)  Temperature: 97 5 °F (36 4 °C) (11/03/19 1057)  Temp Source: Oral (11/03/19 1057)  Respirations: 18 (11/03/19 1057)  Weight - Scale: 56 6 kg (124 lb 12 5 oz) (11/03/19 1620)  SpO2: 95 % (11/03/19 1057)    Current Vitals: Blood Pressure: 141/58 (11/04/19 0726)  Pulse: 82 (11/04/19 0726)  Temperature: 98 1 °F (36 7 °C) (11/04/19 0726)  Temp Source: Oral (11/04/19 0726)  Respirations: 18 (11/04/19 0726)  Weight - Scale: 56 6 kg (124 lb 12 5 oz) (11/03/19 1620)  SpO2: 97 % (11/04/19 0726)        /58   Pulse 82   Temp 98 1 °F (36 7 °C) (Oral)   Resp 18   Wt 56 6 kg (124 lb 12 5 oz)   SpO2 97%   BMI 25 20 kg/m²      General Appearance:    Alert, cooperative, no distress   Lungs:     Respirations unlabored   Heart:    Regular rate and rhythm, S1 and S2 normal, no murmur, rub   or gallop   Abdomen:     Soft, non-tender   Extremities:   MMT 4/5 B/L   Able to move toes and ankles without issues  Pulses:   Palpable DP and PT pulses bilaterally  Legs to toes warm to warm  CRT WNL  Skin:   Right lower extremity with cellulitic erythema and warmth extending from heel wound up to knee, noted to be regressed from line drawn yesterday  Right heel medial well adhered eschar measuring approximately 9 0x10 0cm with unstageable/unknown depth  Central-laterally off of the eschar is noted to be a drained blister with minimal purulent drainage from area  Eschar is mildly boggy  Mild malodor from area  No crepitus or fluctuance noted  No areas of deep probe  Left foot and LE without open wounds  Heel intact   Neurologic:   Gross sensation is intact  Protective sensation is diminished       right heel    right heel post-blister deroofing    right leg cellulitis          Lab Results:   Admission on 11/03/2019   Component Date Value    WBC 11/03/2019 16 12*    RBC 11/03/2019 4 22     Hemoglobin 11/03/2019 12 0     Hematocrit 11/03/2019 37 9     MCV 11/03/2019 90     MCH 11/03/2019 28 4     MCHC 11/03/2019 31 7     RDW 11/03/2019 12 7     MPV 11/03/2019 9 5     Platelets 23/00/5417 505*    nRBC 11/03/2019 0     Neutrophils Relative 11/03/2019 77*    Immat GRANS % 11/03/2019 1     Lymphocytes Relative 11/03/2019 12*    Monocytes Relative 11/03/2019 8     Eosinophils Relative 11/03/2019 1     Basophils Relative 11/03/2019 1     Neutrophils Absolute 11/03/2019 12 45*    Immature Grans Absolute 11/03/2019 0 10     Lymphocytes Absolute 11/03/2019 1 94     Monocytes Absolute 11/03/2019 1 35*    Eosinophils Absolute 11/03/2019 0 19     Basophils Absolute 11/03/2019 0 09     Sodium 11/03/2019 138     Potassium 11/03/2019 3 7     Chloride 11/03/2019 105     CO2 11/03/2019 27     ANION GAP 11/03/2019 6     BUN 11/03/2019 17     Creatinine 11/03/2019 0 60     Glucose 11/03/2019 95     Calcium 11/03/2019 9 3     AST 11/03/2019 22     ALT 11/03/2019 31     Alkaline Phosphatase 11/03/2019 120*    Total Protein 11/03/2019 7 3     Albumin 11/03/2019 2 9*    Total Bilirubin 11/03/2019 0 44     eGFR 11/03/2019 89     LACTIC ACID 11/03/2019 1 6     Protime 11/03/2019 13 8     INR 11/03/2019 1 10     PTT 11/03/2019 27     Procalcitonin 11/03/2019 <0 05     Platelets 97/12/3775 477*    MPV 11/03/2019 9 6     Sodium 11/04/2019 141     Potassium 11/04/2019 4 3     Chloride 11/04/2019 110*    CO2 11/04/2019 26     ANION GAP 11/04/2019 5     BUN 11/04/2019 15     Creatinine 11/04/2019 0 60     Glucose 11/04/2019 97     Calcium 11/04/2019 9 0     eGFR 11/04/2019 89     Magnesium 11/04/2019 2 1     WBC 11/04/2019 11 63*    RBC 11/04/2019 3 90     Hemoglobin 11/04/2019 11 1*    Hematocrit 11/04/2019 34 5*    MCV 11/04/2019 89     MCH 11/04/2019 28 5     MCHC 11/04/2019 32 2     RDW 11/04/2019 12 5     MPV 11/04/2019 9 4     Platelets 00/77/2157 436*    nRBC 11/04/2019 0     Neutrophils Relative 11/04/2019 70     Immat GRANS % 11/04/2019 1     Lymphocytes Relative 11/04/2019 17     Monocytes Relative 11/04/2019 8     Eosinophils Relative 11/04/2019 3     Basophils Relative 11/04/2019 1     Neutrophils Absolute 11/04/2019 8 23*    Immature Grans Absolute 11/04/2019 0 10     Lymphocytes Absolute 11/04/2019 1 95     Monocytes Absolute 11/04/2019 0 93     Eosinophils Absolute 11/04/2019 0 35     Basophils Absolute 11/04/2019 0 07                    Invalid input(s): LABAEARO            Imaging: I have personally reviewed pertinent films in PACS  EKG, Pathology, and Other Studies: I have personally reviewed pertinent reports        Code Status: Level 1 - Full Code  Advance Directive and Living Will:      Power of : Yes  POLST:

## 2019-11-04 NOTE — PROGRESS NOTES
Vancomycin IV Pharmacy-to-Dose Consultation    Laisha Hurtado is a 66 y o  female who is currently receiving Vancomycin IV with management by the Pharmacy Consult service  Assessment/Plan:  The patient was reviewed  Renal function is stable and no signs or symptoms of nephrotoxicity and/or infusion reactions were documented in the chart  Based on todays assessment, continue current vancomycin (day # 2) dosing of 750 mg q12h, with a plan for trough to be drawn at 1230 on 11/5  We will continue to follow the patients culture results and clinical progress daily      Kenyon Salgado, PharmD

## 2019-11-04 NOTE — ASSESSMENT & PLAN NOTE
· Continue rivastigmine  · Fall precautions  · Patient comes from locked dementia unit  · I discussed with  today who advised PT/OT consults for discharge planning

## 2019-11-04 NOTE — ASSESSMENT & PLAN NOTE
· Likely secondary to the above   Improving  · IV abx as above  · Follow up blood and blister cultures

## 2019-11-04 NOTE — UTILIZATION REVIEW
Initial Clinical Review    Admission: Date/Time/Statement: Inpatient Admission Orders (From admission, onward)     Ordered        11/03/19 1303  Inpatient Admission  Once                   Orders Placed This Encounter   Procedures    Inpatient Admission     Standing Status:   Standing     Number of Occurrences:   1     Order Specific Question:   Admitting Physician     Answer:   Mortimer January [[id number]]     Order Specific Question:   Level of Care     Answer:   Med Surg [16]     Order Specific Question:   Estimated length of stay     Answer:   More than 2 Midnights     Order Specific Question:   Certification     Answer:   I certify that inpatient services are medically necessary for this patient for a duration of greater than two midnights  See H&P and MD Progress Notes for additional information about the patient's course of treatment  ED Arrival Information     Expected Arrival Acuity Means of Arrival Escorted By Service Admission Type    - 11/3/2019 10:53 Urgent Ambulance [place] Hospitalist Urgent    Arrival Complaint    swollen foot        Chief Complaint   Patient presents with    Foot Swelling     Pt was sent in by EMS for having increased swelling in her right foot  Pt c/o pain in right foot     Assessment/Plan: 67 y/o female presents to ED from nursing home by EMS with worsening RLE swelling  Has redness, swelling, tenderness up to knee  Also has a known heel ulcer  Admitted as inpatient due to R leg cellulitis  Continue IV antibiotics  Podiatry consult  Pain control  R foot xray  11/4 Podiatry consult:  Unstageable R heel pressure ulcer with eschar/blister  R heel blister deroofed and cultured  Noted purulence underneath  Continue wound care and heel offloading  No surgical intervention required at this time                ED Triage Vitals [11/03/19 1057]   Temperature Pulse Respirations Blood Pressure SpO2   97 5 °F (36 4 °C) 96 18 153/65 95 %      Temp Source Heart Rate Source Patient Position - Orthostatic VS BP Location FiO2 (%)   Oral Monitor Sitting Right arm --      Pain Score       Worst Possible Pain        Wt Readings from Last 1 Encounters:   11/03/19 56 6 kg (124 lb 12 5 oz)     Additional Vital Signs:   11/04/19 0726 98 1 °F (36 7 °C) 82 18 141/58 97 %    11/03/19 2343 98 1 °F (36 7 °C) 80 18 136/61 99 % None (Room air)   11/03/19 1620 97 5 °F (36 4 °C) 86 18 126/58 93 % None (Room air)   11/03/19 1500  102 18 137/60 98 % None (Room air)   11/03/19 1418  85  127/59 99 % None (Room air)   11/03/19 1400  94 18 127/59 100 %    11/03/19 1300  80  120/80 98 %    11/03/19 1200  88 18 138/57 99 %        Pertinent Labs/Diagnostic Test Results:   Results from last 7 days   Lab Units 11/04/19  0501 11/03/19  1729 11/03/19  1144   WBC Thousand/uL 11 63*  --  16 12*   HEMOGLOBIN g/dL 11 1*  --  12 0   HEMATOCRIT % 34 5*  --  37 9   PLATELETS Thousands/uL 436* 477* 505*   NEUTROS ABS Thousands/µL 8 23*  --  12 45*         Results from last 7 days   Lab Units 11/04/19  0501 11/03/19  1144   SODIUM mmol/L 141 138   POTASSIUM mmol/L 4 3 3 7   CHLORIDE mmol/L 110* 105   CO2 mmol/L 26 27   ANION GAP mmol/L 5 6   BUN mg/dL 15 17   CREATININE mg/dL 0 60 0 60   EGFR ml/min/1 73sq m 89 89   CALCIUM mg/dL 9 0 9 3   MAGNESIUM mg/dL 2 1  --      Results from last 7 days   Lab Units 11/03/19  1144   AST U/L 22   ALT U/L 31   ALK PHOS U/L 120*   TOTAL PROTEIN g/dL 7 3   ALBUMIN g/dL 2 9*   TOTAL BILIRUBIN mg/dL 0 44         Results from last 7 days   Lab Units 11/04/19  0501 11/03/19  1144   GLUCOSE RANDOM mg/dL 97 95       Results from last 7 days   Lab Units 11/03/19  1144   PROTIME seconds 13 8   INR  1 10   PTT seconds 27         Results from last 7 days   Lab Units 11/03/19  1144   PROCALCITONIN ng/ml <0 05     Results from last 7 days   Lab Units 11/03/19  1144   LACTIC ACID mmol/L 1 6     11/3 Xray R foot:  Dorsal skin ulceration and subcutaneous edema consistent with cellulitis  No acute osseous abnormality  No radiographic evidence of osteomyelitis  Degenerative changes     ED Treatment:   Medication Administration from 11/03/2019 1053 to 11/03/2019 1618       Date/Time Order Dose Route Action     11/03/2019 1335 vancomycin (VANCOCIN) IVPB (premix) 750 mg 750 mg Intravenous New Bag     11/03/2019 1234 cefepime (MAXIPIME) 2 g/50 mL dextrose IVPB 2,000 mg Intravenous New Bag     11/03/2019 1445 acetaminophen (TYLENOL) tablet 650 mg 650 mg Oral Given        Past Medical History:   Diagnosis Date    [facility]     [facility] ([facility]     Expressive aphasia     Hyperlipidemia     Hypertension     Impaired fasting glucose     Osteoarthritis     Vitamin D deficiency      Present on Admission:   Cellulitis of right leg   Frontotemporal dementia without behavioral disturbance (HCC)   Essential hypertension      Admitting Diagnosis: Foot pain [M79 673]  Diabetic foot ulcer ([facility] [address] ) [S65 319, L97 509]  Cellulitis of right leg [L03 115]  Cellulitis of right anterior lower leg [L03 115]  Age/Sex: 66 y o  female  Admission Orders:  Scheduled Medications:    Medications:  acetaminophen 650 mg Oral Q6H [address]   artificial tear  Both Eyes HS   calcium carbonate-vitamin D 1 tablet Oral Daily With Breakfast   cefepime 2,000 mg Intravenous Q12H   cyanocobalamin 1,000 mcg Oral Daily   docusate sodium 100 mg Oral Daily   ENSURE ACTIVE HIGH PROTEIN 8 oz Oral Daily   heparin (porcine) 5,000 Units Subcutaneous Q8H [address]   lisinopril 5 mg Oral Daily   rivastigmine 4 5 mg Oral Daily   vancomycin 12 5 mg/kg Intravenous Q12H     Continuous IV Infusions:     PRN Meds:    sodium chloride (PF) 3 mL Intravenous PRN       IP CONSULT TO PHARMACY  IP CONSULT TO PODIATRY   Prevalon boots  VS  SCDs  Fall precautions  MRSA cx  Wound cx    Network Utilization Review Department  [email or web address]  ATTENTION: Please call with any questions or concerns to [phone] and carefully listen to the prompts so that you are directed to the right person  All voicemails are confidential   Jose Garcia all requests for admission clinical reviews, approved or denied determinations and any other requests to dedicated fax number below belonging to the campus where the patient is receiving treatment    FACILITY NAME UR FAX NUMBER   ADMISSION DENIALS (Administrative/Medical Necessity) [address] [facility] (Maternity/NICU/Pediatrics) [phone]   [facility] [address] [facility] [address] [facility] [phone]   [facility] [address] [facility] [phone]   Karthikeyan Stoddard [address] [facility] [address] [phone]

## 2019-11-05 LAB
ANION GAP SERPL CALCULATED.3IONS-SCNC: 7 MMOL/L (ref 4–13)
BUN SERPL-MCNC: 11 MG/DL (ref 5–25)
CALCIUM SERPL-MCNC: 9.1 MG/DL (ref 8.3–10.1)
CHLORIDE SERPL-SCNC: 109 MMOL/L (ref 100–108)
CO2 SERPL-SCNC: 26 MMOL/L (ref 21–32)
CREAT SERPL-MCNC: 0.55 MG/DL (ref 0.6–1.3)
ERYTHROCYTE [DISTWIDTH] IN BLOOD BY AUTOMATED COUNT: 12.5 % (ref 11.6–15.1)
GFR SERPL CREATININE-BSD FRML MDRD: 91 ML/MIN/1.73SQ M
GLUCOSE SERPL-MCNC: 96 MG/DL (ref 65–140)
HCT VFR BLD AUTO: 36.9 % (ref 34.8–46.1)
HGB BLD-MCNC: 11.9 G/DL (ref 11.5–15.4)
MCH RBC QN AUTO: 28.5 PG (ref 26.8–34.3)
MCHC RBC AUTO-ENTMCNC: 32.2 G/DL (ref 31.4–37.4)
MCV RBC AUTO: 89 FL (ref 82–98)
PLATELET # BLD AUTO: 483 THOUSANDS/UL (ref 149–390)
PMV BLD AUTO: 9.6 FL (ref 8.9–12.7)
POTASSIUM SERPL-SCNC: 4 MMOL/L (ref 3.5–5.3)
RBC # BLD AUTO: 4.17 MILLION/UL (ref 3.81–5.12)
SODIUM SERPL-SCNC: 142 MMOL/L (ref 136–145)
WBC # BLD AUTO: 11.11 THOUSAND/UL (ref 4.31–10.16)

## 2019-11-05 PROCEDURE — 97167 OT EVAL HIGH COMPLEX 60 MIN: CPT

## 2019-11-05 PROCEDURE — 85027 COMPLETE CBC AUTOMATED: CPT | Performed by: PHYSICIAN ASSISTANT

## 2019-11-05 PROCEDURE — G8987 SELF CARE CURRENT STATUS: HCPCS

## 2019-11-05 PROCEDURE — G8978 MOBILITY CURRENT STATUS: HCPCS

## 2019-11-05 PROCEDURE — 80048 BASIC METABOLIC PNL TOTAL CA: CPT | Performed by: PHYSICIAN ASSISTANT

## 2019-11-05 PROCEDURE — G8979 MOBILITY GOAL STATUS: HCPCS

## 2019-11-05 PROCEDURE — 99232 SBSQ HOSP IP/OBS MODERATE 35: CPT | Performed by: PODIATRIST

## 2019-11-05 PROCEDURE — G8988 SELF CARE GOAL STATUS: HCPCS

## 2019-11-05 PROCEDURE — 97163 PT EVAL HIGH COMPLEX 45 MIN: CPT

## 2019-11-05 PROCEDURE — 99232 SBSQ HOSP IP/OBS MODERATE 35: CPT | Performed by: PHYSICIAN ASSISTANT

## 2019-11-05 RX ORDER — CEFAZOLIN SODIUM 2 G/50ML
2000 SOLUTION INTRAVENOUS EVERY 8 HOURS
Status: DISCONTINUED | OUTPATIENT
Start: 2019-11-05 | End: 2019-11-07

## 2019-11-05 RX ADMIN — HEPARIN SODIUM 5000 UNITS: 5000 INJECTION INTRAVENOUS; SUBCUTANEOUS at 05:24

## 2019-11-05 RX ADMIN — RIVASTIGMINE TARTRATE 4.5 MG: 3 CAPSULE ORAL at 08:35

## 2019-11-05 RX ADMIN — WHITE PETROLATUM 57.7 %-MINERAL OIL 31.9 % EYE OINTMENT: at 21:33

## 2019-11-05 RX ADMIN — HEPARIN SODIUM 5000 UNITS: 5000 INJECTION INTRAVENOUS; SUBCUTANEOUS at 13:26

## 2019-11-05 RX ADMIN — Medication 1 TABLET: at 08:33

## 2019-11-05 RX ADMIN — CYANOCOBALAMIN TAB 500 MCG 1000 MCG: 500 TAB at 08:33

## 2019-11-05 RX ADMIN — CEFAZOLIN SODIUM 2000 MG: 2 SOLUTION INTRAVENOUS at 12:12

## 2019-11-05 RX ADMIN — ACETAMINOPHEN 650 MG: 325 TABLET ORAL at 05:24

## 2019-11-05 RX ADMIN — ACETAMINOPHEN 650 MG: 325 TABLET ORAL at 12:15

## 2019-11-05 RX ADMIN — VANCOMYCIN HYDROCHLORIDE 750 MG: 750 INJECTION, SOLUTION INTRAVENOUS at 00:11

## 2019-11-05 RX ADMIN — CEFAZOLIN SODIUM 2000 MG: 2 SOLUTION INTRAVENOUS at 19:40

## 2019-11-05 RX ADMIN — DOCUSATE SODIUM 100 MG: 100 CAPSULE, LIQUID FILLED ORAL at 08:33

## 2019-11-05 RX ADMIN — HEPARIN SODIUM 5000 UNITS: 5000 INJECTION INTRAVENOUS; SUBCUTANEOUS at 21:33

## 2019-11-05 RX ADMIN — LISINOPRIL 5 MG: 5 TABLET ORAL at 08:33

## 2019-11-05 RX ADMIN — ACETAMINOPHEN 650 MG: 325 TABLET ORAL at 17:26

## 2019-11-05 NOTE — ASSESSMENT & PLAN NOTE
· Likely secondary to the above   Improving  · IV abx as above  · Blood cultures preliminarily negative x2 at 24 hours  · Blister culture as above  · CBC in AM

## 2019-11-05 NOTE — CONSULTS
Vancomycin IV Pharmacy-to-Dose Consultation    Taz Guthrie is a 66 y o  female who was receiving Vancomycin IV with management by the Pharmacy Consult service for treatment of skin-soft tissue infection    The patient's Vancomycin therapy has been completed / discontinued  Thank you for allowing us to take part in this patient's care  Pharmacy will sign-off now; please call or re-consult if there are any questions          Yaritza jackson RPh

## 2019-11-05 NOTE — ASSESSMENT & PLAN NOTE
· Patient presented to the ED on 11/3/19 with right leg cellulitis and right heel wound  · Extent of cellulitis was almost up to the knee and was marked to see progression - has improved based on admission markings  · Podiatry consult appreciated  De-escalate IV abx  Cefepime was discontinued prior  MRSA swab negative and wound culture with 3+ gram negative rods and 2+ gram positive cocci in pairs   Place on IV Ancef  · Right foot XR revealed dorsal skin ulceration and subcutaneous edema consistent with cellulitis with no acute osseous abnormality or radiographic evidence of osteomyelitis per the result report

## 2019-11-05 NOTE — PROGRESS NOTES
Progress Note - Seema Colin 1945, 66 y o  female MRN: [id number]    Unit/Bed#: -01 Encounter: [id number]    Primary Care Provider: Jenny Caraballo MD   Date and time admitted to hospital: 11/3/2019 10:54 AM        * Cellulitis of right leg  Assessment & Plan  · Patient presented to the ED on 11/3/19 with right leg cellulitis and right heel wound  · Extent of cellulitis was almost up to the knee and was marked to see progression - has improved based on admission markings  · Podiatry consult appreciated  De-escalate IV abx  Cefepime was discontinued prior  MRSA swab negative and wound culture with 3+ gram negative rods and 2+ gram positive cocci in pairs  Place on IV Ancef  · Right foot XR revealed dorsal skin ulceration and subcutaneous edema consistent with cellulitis with no acute osseous abnormality or radiographic evidence of osteomyelitis per the result report    Leukocytosis  Assessment & Plan  · Likely secondary to the above  Improving  · IV abx as above  · Blood cultures preliminarily negative x2 at 24 hours  · Blister culture as above  · CBC in AM    Frontotemporal dementia without behavioral disturbance (HCC)  Assessment & Plan  · Continue rivastigmine  · Fall precautions  · Patient comes from locked dementia unit  · PT/OT consults pending for discharge planning     Essential hypertension  Assessment & Plan  · Blood pressure stable  · Continue lisinopril        VTE Pharmacologic Prophylaxis:   Pharmacologic: Heparin  Mechanical VTE Prophylaxis in Place: No    Patient Centered Rounds: I have performed bedside rounds with nursing staff today  Charla Villegas    Discussions with Specialists or Other Care Team Provider: nurse    Education and Discussions with Family / Patient: patient and her daughter, Manny Lauren, however the call was disconnected initially and could not reach again despite 2 more attempts  I tried later and was able to reach her with an update  Time Spent for Care: 30 minutes  More than 50% of total time spent on counseling and coordination of care as described above  Current Length of Stay: 2 day(s)     Current Patient Status: Inpatient   Certification Statement: The patient will continue to require additional inpatient hospital stay due to continued IV abx, awaiting final culture data    Discharge Plan: continued IV abx at this time pending final culture reports  PT/OT consults pending  Code Status: Level 1 - Full Code      Subjective:   Ms Shahla Bravo indicates some discomfort of the right foot  Otherwise no complaint  Patient's daughter and record review also confirms patient has expressive aphasia at baseline and this is not new  Objective:     Vitals:   Temp (24hrs), Av 5 °F (36 9 °C), Min:98 5 °F (36 9 °C), Max:98 6 °F (37 °C)    Temp:  [98 5 °F (36 9 °C)-98 6 °F (37 °C)] 98 5 °F (36 9 °C)  HR:  [69-87] 87  Resp:  [18] 18  BP: (128-168)/(59-69) 128/59  SpO2:  [90 %-99 %] 98 %  Body mass index is 25 2 kg/m²  Input and Output Summary (last 24 hours): Intake/Output Summary (Last 24 hours) at 2019 1125  Last data filed at 2019 0800  Gross per 24 hour   Intake 360 ml   Output 922 ml   Net -562 ml       Physical Exam:     Physical Exam   Constitutional: No distress  Patient seen lying in bed resting with her nurse So Olivo present  Cardiovascular: Normal rate and regular rhythm  Pulmonary/Chest: Effort normal and breath sounds normal  No respiratory distress  She has no wheezes  Abdominal: Soft  Bowel sounds are normal  There is no tenderness  Neurological: She is alert  Follows all commands  Some expressive aphasia noted, however per discussion with patient's daughter and record review this is not new   Skin: Skin is warm  She is not diaphoretic  Right foot wrapped and in boot   Psychiatric: Her behavior is normal    Not agitated  Laughs and smiles frequently  Cooperates with examiner   Vitals reviewed          Additional Data: Labs:    Results from last 7 days   Lab Units 11/05/19  0506 11/04/19  0501   WBC Thousand/uL 11 11* 11 63*   HEMOGLOBIN g/dL 11 9 11 1*   HEMATOCRIT % 36 9 34 5*   PLATELETS Thousands/uL 483* 436*   NEUTROS PCT %  --  70   LYMPHS PCT %  --  17   MONOS PCT %  --  8   EOS PCT %  --  3     Results from last 7 days   Lab Units 11/05/19  0506  11/03/19  1144   SODIUM mmol/L 142   < > 138   POTASSIUM mmol/L 4 0   < > 3 7   CHLORIDE mmol/L 109*   < > 105   CO2 mmol/L 26   < > 27   BUN mg/dL 11   < > 17   CREATININE mg/dL 0 55*   < > 0 60   ANION GAP mmol/L 7   < > 6   CALCIUM mg/dL 9 1   < > 9 3   ALBUMIN g/dL  --   --  2 9*   TOTAL BILIRUBIN mg/dL  --   --  0 44   ALK PHOS U/L  --   --  120*   ALT U/L  --   --  31   AST U/L  --   --  22   GLUCOSE RANDOM mg/dL 96   < > 95    < > = values in this interval not displayed  Results from last 7 days   Lab Units 11/03/19  1144   INR  1 10             Results from last 7 days   Lab Units 11/03/19  1144   LACTIC ACID mmol/L 1 6   PROCALCITONIN ng/ml <0 05           * I Have Reviewed All Lab Data Listed Above  * Additional Pertinent Lab Tests Reviewed: All Labs Within Last 24 Hours Reviewed    Imaging:    Imaging Reports Reviewed Today Include: None  Imaging Personally Reviewed by Myself Includes:  None    Recent Cultures (last 7 days):     Results from last 7 days   Lab Units 11/04/19  0959 11/03/19  1144   BLOOD CULTURE   --  No Growth at 24 hrs  No Growth at 24 hrs     GRAM STAIN RESULT  No polys seen*  3+ Gram negative rods*  2+ Gram positive cocci in pairs*  --        Last 24 Hours Medication List:     Current Facility-Administered Medications:  acetaminophen 650 mg Oral Q6H Wolf Hernandez MD    artificial tear  Both Eyes HS Dominique Main MD    calcium carbonate-vitamin D 1 tablet Oral Daily With Breakfast Dominique Main MD    cyanocobalamin 1,000 mcg Oral Daily Dominique Main MD    docusate sodium 100 mg Oral Daily Dominique Main MD ENSURE ACTIVE HIGH PROTEIN 8 oz Oral Daily Cornell Connors MD    heparin (porcine) 5,000 Units Subcutaneous Q8H [facility] Cornell Connors MD    lisinopril 5 mg Oral Daily Cornell Connors MD    rivastigmine 4 5 mg Oral Daily Cornell Connors MD    sodium chloride (PF) 3 mL Intravenous PRN Cornell Connors MD    vancomycin 12 5 mg/kg Intravenous Q12H Cornell Connors MD Last Rate: 750 mg (11/05/19 0011)        Today, Patient Was Seen By: Gregorio Lee PA-C    ** Please Note: Dictation voice to text software may have been used in the creation of this document   **

## 2019-11-05 NOTE — ORTHOTIC NOTE
Orthotic Note            Date: 11/5/2019      Patient Name: Sean Hui            Reason for Consult:  Patient Active Problem List   Diagnosis    [facility]otemporal dementia without behavioral disturbance ([facility] )    Primary progressive aphasia ([facility] )    Recurrent falls    Essential hypertension    Pure hypercholesterolemia    Acute cystitis without hematuria    Forehead laceration    Peripheral vascular disease ([facility]    Left knee pain    Ambulatory dysfunction    Contusion of multiple sites of right shoulder    Left hip pain status post fall    Frontotemporal dementia without behavioral disturbance    Cellulitis of right leg    Leukocytosis     GloboPed Heel Offloading Shoe    Fitted, donned, and doffed a Medium Globoped Heel Offloading Shoe to RLE while pt was supine in bed  Globoped at bedside, and instructions/ adjustments reviewed at this time  RN aware, [employer] will follow up as needed  Recommendations:  Please call [employer] [phone] with and bracing questions or concerns      JACQUI Pitt

## 2019-11-05 NOTE — PLAN OF CARE
Problem: PHYSICAL THERAPY ADULT  Goal: Performs mobility at highest level of function for planned discharge setting  See evaluation for individualized goals  Description  Treatment/Interventions: Functional transfer training, LE strengthening/ROM, Therapeutic exercise, Endurance training, Cognitive reorientation, Patient/family training, Equipment eval/education, Bed mobility, Gait training, Spoke to nursing  Equipment Recommended: Ela Hernandez       See flowsheet documentation for full assessment, interventions and recommendations  Note:   Prognosis: Good  Problem List: Decreased strength, Decreased endurance, Impaired balance, Decreased mobility, Decreased cognition, Impaired judgement, Decreased safety awareness, Pain, Orthopedic restrictions, Impaired hearing  Assessment: Pt seen for high complexity PT evaluation due to decrease in functional mobility status compared to baseline  Pt with active PT eval/treat and up with assist orders at this time  Pt is a 66 y o  yo F who presented to [facility] with cellulitis of R leg on 11/03/19  Pt  has a past medical history of Dementia ([facility] ), Diabetes mellitus ([facility] ), Expressive aphasia, Hyperlipidemia, Hypertension, Impaired fasting glucose, Osteoarthritis, and Vitamin D deficiency  Pt resides at [facility] in the locked dementia unit and was ambulating with cane PTA with assist for ADLs  Pt presents with decreased strength, balance, endurance as well as cognitive impairment that contribute to limitations in bed mobility, functional transfers, and functional mobility  Pt requires Min A for supine>sit, Mod A for STS and ambulation 3 ft from bed to chair with RW at this time  Pt left upright in bedside chair with chair alarm intact and with all needs in reach  Pt will benefit from skilled therapy in order to address current impairments and functional limitations   PT to follow pt and recommending return to facility with appropriate support and PT once medically cleared  Recommendation: Other (Comment)(return to facility with appropriate support and PT)     PT - OK to Discharge: Yes(once medically cleared)    See flowsheet documentation for full assessment

## 2019-11-05 NOTE — ASSESSMENT & PLAN NOTE
· Continue rivastigmine  · Fall precautions  · Patient comes from locked dementia unit  · PT/OT consults pending for discharge planning

## 2019-11-05 NOTE — PHYSICAL THERAPY NOTE
Physical Therapy Evaluation     Patient's Name: Ryan Kendrick    Admitting Diagnosis  Foot pain [M79 673]  Diabetic foot ulcer ([facility] ) [T53 152, L97 509]  Cellulitis of right leg [E33 592]  Cellulitis of right anterior lower leg [V71 727]    Problem List  Patient Active Problem List   Diagnosis    Frontotemporal dementia without behavioral disturbance ([facility] )    Primary progressive aphasia ([facility] )    Recurrent falls    Essential hypertension    Pure hypercholesterolemia    Acute cystitis without hematuria    Forehead laceration    Peripheral vascular disease ([facility] )    Left knee pain    Ambulatory dysfunction    Contusion of multiple sites of right shoulder    Left hip pain status post fall    Frontotemporal dementia without behavioral disturbance    Cellulitis of right leg    Leukocytosis       Past Medical History  Past Medical History:   Diagnosis Date    [facility]     [facility]     Expressive aphasia     Hyperlipidemia     Hypertension     Impaired fasting glucose     Osteoarthritis     Vitamin D deficiency        Past Surgical History  Past Surgical History:   Procedure Laterality Date    JOINT REPLACEMENT      right knee        11/05/19 8325   Note Type   Note type Eval only   Pain Assessment   Pain Assessment FLACC   Pain Location Leg   Pain Orientation Right   Pain Rating: FLACC (Rest) - Face 0   Pain Rating: FLACC (Rest) - Legs 0   Pain Rating: FLACC (Rest) - Activity 0   Pain Rating: FLACC (Rest) - Cry 0   Pain Rating: FLACC (Rest) - Consolability 0   Score: FLACC (Rest) 0   Pain Rating: FLACC (Activity) - Face 1   Pain Rating: FLACC (Activity) - Legs 0   Pain Rating: FLACC (Activity) - Activity 0   Pain Rating: FLACC (Activity) - Cry 1   Pain Rating: FLACC (Activity) - Consolability 0   Score: FLACC (Activity) 2   Home Living   Type of Home Other (Comment)  ([facility] locked dementia unit)   Home Layout One level   Home Equipment Cane   Additional Comments Pt with baseline dementia, poor historian  Home set up obtained from EMR  Prior Function   Level of [place] Needs assistance with ADLs and functional mobility; Needs assistance with IADLs   Lives With Facility staff   Receives Help From Personal care attendant   ADL Assistance Needs assistance   IADLs Needs assistance   Falls in the last 6 months 1 to 4  (at least 1 per EMR)   Comments Pt with baseline dementia, poor historian  PLOF obtained from EMR  Restrictions/Precautions   Weight Bearing Precautions Per Order Yes   RLE Weight Bearing Per Order WBAT  (with heel off loading shoe per phone call w/ podiatry)   Braces or Orthoses   (globoped heel off loading shoe)   Other Precautions Cognitive; Chair Alarm; Bed Alarm;WBS;Pain; Fall Risk   General   Family/Caregiver Present No   Cognition   Overall Cognitive Status Impaired   Arousal/Participation Alert   Orientation Level Oriented to person;Disoriented to place; Disoriented to time;Disoriented to situation   Memory Decreased recall of precautions;Decreased recall of recent events;Decreased short term memory;Decreased recall of biographical information   Following Commands Follows one step commands with increased time or repetition   Comments Pt with baseline dementia, currently requiring cues for safety and sequencing tasks  Pt appears to be with ocassional word finding difficulties  Pt able to state first name  RLE Assessment   RLE Assessment   (functionally 3+/5)   LLE Assessment   LLE Assessment   (functionally 3+/5)   Bed Mobility   Supine to Sit 4  Minimal assistance   Additional items Assist x 1; Increased time required;LE management;Verbal cues   Additional Comments Supine in bed upon PT arrival   Pt left upright in bedside chair with chair alarm intact and with all needs in reach at end of therapy session  Transfers   Sit to Stand 3  Moderate assistance   Additional items Assist x 1; Increased time required;Verbal cues   Stand to Sit 4  Minimal assistance Additional items Assist x 1; Increased time required;Verbal cues   Additional Comments Transfers with RW   VC for hand placment and safety  Ambulation/Elevation   Gait pattern Short stride; Foward flexed;Poor UE support;Decreased foot clearance; Excessively slow   Gait Assistance 3  Moderate assist   Additional items Assist x 1;Verbal cues; Tactile cues   Assistive Device Rolling walker   Distance 3 ft from bed to chair   Stair Management Assistance Not tested   Balance   Static Sitting Fair   Dynamic Sitting Fair -   Static Standing Poor   Dynamic Standing Poor -   Ambulatory Poor -   Endurance Deficit   Endurance Deficit Yes   Endurance Deficit Description fatigue, weakness, pain   Activity Tolerance   Activity Tolerance Patient limited by fatigue;Patient limited by pain; Other (Comment)  (cognition)   Medical Staff Made Aware OT   Nurse Made Aware RN cleared pt to be seen by PT   Assessment   Prognosis Good   Problem List Decreased strength;Decreased endurance; Impaired balance;Decreased mobility; Decreased cognition; Impaired judgement;Decreased safety awareness;Pain;Orthopedic restrictions; Impaired hearing   Assessment Pt seen for high complexity PT evaluation due to decrease in functional mobility status compared to baseline  Pt with active PT eval/treat and up with assist orders at this time  Pt is a 66 y o  yo F who presented to [facility] with cellulitis of R leg on 11/03/19  Pt  has a past medical history of Dementia ([facility] ), Diabetes mellitus ([facility] [facility] ), Expressive aphasia, Hyperlipidemia, Hypertension, Impaired fasting glucose, Osteoarthritis, and Vitamin D deficiency  Pt resides at [facility] in the locked dementia unit and was ambulating with cane PTA with assist for ADLs  Pt presents with decreased strength, balance, endurance as well as cognitive impairment that contribute to limitations in bed mobility, functional transfers, and functional mobility    Pt requires Min A for supine>sit, Mod A for STS and ambulation 3 ft from bed to chair with RW at this time  Pt left upright in bedside chair with chair alarm intact and with all needs in reach  Pt will benefit from skilled therapy in order to address current impairments and functional limitations  PT to follow pt and recommending return to facility with appropriate support and PT once medically cleared  Goals   Patient Goals to eat dinner   STG Expiration Date 11/19/19   Short Term Goal #1 1  Pt will demonstrate ability to perform all aspects of bed mobility with supervision A in order to increase independence and decrease burden on caregivers  2  Pt will demonstrate ability to perform functional transfers with supervision A in order to increase independence and decrease burden on caregivers  3  Pt will demonstrate ability to ambulate 150 ft with least restrictive AD with supervision A in order to return to mobility safely  4  Pt will demonstrate improved balance by one grade order to decrease risk of falls  5  Pt will increase b//l LE strength by 1 grade in order to increase ease of functional mobility and transfers  Plan   Treatment/Interventions Functional transfer training;LE strengthening/ROM; Therapeutic exercise; Endurance training;Cognitive reorientation;Patient/family training;Equipment eval/education; Bed mobility;Gait training;Spoke to nursing   PT Frequency Other (Comment)  (3-5x/wk)   Recommendation   Recommendation Other (Comment)  (return to facility with appropriate support and PT)   Equipment Recommended Walker   PT - OK to Discharge Yes  (once medically cleared)   Modified Jason Scale   Modified Leonard Scale 4   Barthel Index   Feeding 10   Bathing 0   Grooming Score 0   Dressing Score 5   Bladder Score 5   Bowels Score 10   Toilet Use Score 5   Transfers (Bed/Chair) Score 5   Mobility (Level Surface) Score 0   Stairs Score 0   Barthel Index Score 40         Haleigh Rolon, PT, DPT

## 2019-11-06 LAB
ANION GAP SERPL CALCULATED.3IONS-SCNC: 6 MMOL/L (ref 4–13)
BUN SERPL-MCNC: 13 MG/DL (ref 5–25)
CALCIUM SERPL-MCNC: 8.9 MG/DL (ref 8.3–10.1)
CHLORIDE SERPL-SCNC: 109 MMOL/L (ref 100–108)
CO2 SERPL-SCNC: 24 MMOL/L (ref 21–32)
CREAT SERPL-MCNC: 0.67 MG/DL (ref 0.6–1.3)
ERYTHROCYTE [DISTWIDTH] IN BLOOD BY AUTOMATED COUNT: 12.5 % (ref 11.6–15.1)
GFR SERPL CREATININE-BSD FRML MDRD: 86 ML/MIN/1.73SQ M
GLUCOSE SERPL-MCNC: 96 MG/DL (ref 65–140)
HCT VFR BLD AUTO: 35.2 % (ref 34.8–46.1)
HGB BLD-MCNC: 11.3 G/DL (ref 11.5–15.4)
MCH RBC QN AUTO: 28.3 PG (ref 26.8–34.3)
MCHC RBC AUTO-ENTMCNC: 32.1 G/DL (ref 31.4–37.4)
MCV RBC AUTO: 88 FL (ref 82–98)
PLATELET # BLD AUTO: 491 THOUSANDS/UL (ref 149–390)
PMV BLD AUTO: 9.7 FL (ref 8.9–12.7)
POTASSIUM SERPL-SCNC: 4.2 MMOL/L (ref 3.5–5.3)
RBC # BLD AUTO: 3.99 MILLION/UL (ref 3.81–5.12)
SODIUM SERPL-SCNC: 139 MMOL/L (ref 136–145)
WBC # BLD AUTO: 8.72 THOUSAND/UL (ref 4.31–10.16)

## 2019-11-06 PROCEDURE — 97535 SELF CARE MNGMENT TRAINING: CPT

## 2019-11-06 PROCEDURE — 97530 THERAPEUTIC ACTIVITIES: CPT

## 2019-11-06 PROCEDURE — 85027 COMPLETE CBC AUTOMATED: CPT | Performed by: PHYSICIAN ASSISTANT

## 2019-11-06 PROCEDURE — 80048 BASIC METABOLIC PNL TOTAL CA: CPT | Performed by: PHYSICIAN ASSISTANT

## 2019-11-06 PROCEDURE — 99232 SBSQ HOSP IP/OBS MODERATE 35: CPT | Performed by: INTERNAL MEDICINE

## 2019-11-06 RX ADMIN — HEPARIN SODIUM 5000 UNITS: 5000 INJECTION INTRAVENOUS; SUBCUTANEOUS at 05:31

## 2019-11-06 RX ADMIN — DOCUSATE SODIUM 100 MG: 100 CAPSULE, LIQUID FILLED ORAL at 08:47

## 2019-11-06 RX ADMIN — CEFAZOLIN SODIUM 2000 MG: 2 SOLUTION INTRAVENOUS at 04:15

## 2019-11-06 RX ADMIN — CEFAZOLIN SODIUM 2000 MG: 2 SOLUTION INTRAVENOUS at 12:20

## 2019-11-06 RX ADMIN — LISINOPRIL 5 MG: 5 TABLET ORAL at 08:48

## 2019-11-06 RX ADMIN — CYANOCOBALAMIN TAB 500 MCG 1000 MCG: 500 TAB at 08:47

## 2019-11-06 RX ADMIN — RIVASTIGMINE TARTRATE 4.5 MG: 3 CAPSULE ORAL at 08:49

## 2019-11-06 RX ADMIN — Medication 1 TABLET: at 08:25

## 2019-11-06 RX ADMIN — ACETAMINOPHEN 650 MG: 325 TABLET ORAL at 05:30

## 2019-11-06 RX ADMIN — ACETAMINOPHEN 650 MG: 325 TABLET ORAL at 17:13

## 2019-11-06 RX ADMIN — CEFAZOLIN SODIUM 2000 MG: 2 SOLUTION INTRAVENOUS at 20:18

## 2019-11-06 RX ADMIN — HEPARIN SODIUM 5000 UNITS: 5000 INJECTION INTRAVENOUS; SUBCUTANEOUS at 21:13

## 2019-11-06 RX ADMIN — ACETAMINOPHEN 650 MG: 325 TABLET ORAL at 12:20

## 2019-11-06 RX ADMIN — WHITE PETROLATUM 57.7 %-MINERAL OIL 31.9 % EYE OINTMENT: at 21:13

## 2019-11-06 RX ADMIN — HEPARIN SODIUM 5000 UNITS: 5000 INJECTION INTRAVENOUS; SUBCUTANEOUS at 13:34

## 2019-11-06 NOTE — OCCUPATIONAL THERAPY NOTE
[address]  Evaluation     Patient Name: Liane Branham  MMHMX'Y Date: 11/5/2019  Problem List  Principal Problem:    Cellulitis of right leg  Active Problems:    Frontotemporal dementia without behavioral disturbance ([facility] )    Essential hypertension    Leukocytosis    Past Medical History  Past Medical History:   Diagnosis Date    Dementia [facility] )     [facility]     Expressive aphasia     Hyperlipidemia     Hypertension     Impaired fasting glucose     Osteoarthritis     Vitamin D deficiency      Past Surgical History  Past Surgical History:   Procedure Laterality Date    JOINT REPLACEMENT      right knee             11/05/19 6340   Note Type   Note type Eval/Treat   Restrictions/Precautions   Weight Bearing Precautions Per Order Yes   RLE Weight Bearing Per Order WBAT   Braces or Orthoses   (global ped heel off loading shoe)   Other Precautions Cognitive; Chair Alarm; Bed Alarm;WBS;Fall Risk;Pain   Pain Assessment   Pain Assessment FLACC   Pain Rating: FLACC (Rest) - Face 0   Pain Rating: FLACC (Rest) - Legs 0   Pain Rating: FLACC (Rest) - Activity 0   Pain Rating: FLACC (Rest) - Cry 0   Pain Rating: FLACC (Rest) - Consolability 0   Score: FLACC (Rest) 0   Pain Rating: FLACC (Activity) - Face 1   Pain Rating: FLACC (Activity) - Legs 0   Pain Rating: FLACC (Activity) - Activity 0   Pain Rating: FLACC (Activity) - Cry 1   Pain Rating: FLACC (Activity) - Consolability 0   Score: FLACC (Activity) 2   Home Living   Type of Home Other (Comment)  ([facility] locked unit)   Home Layout One level   Home Equipment Cane   Additional Comments Pt has baseline dementia and is poor historian  Home set up obtained from CM note  Will follow up with CM for further home set up   Prior Function   Level of [place] Needs assistance with IADLs; Needs assistance with ADLs and functional mobility   Lives With Facility staff   Receives Help From Personal care attendant   ADL Assistance Needs assistance IADLs Needs assistance   Falls in the last 6 months 1 to 4   Vocational Retired   Comments Pt has baseline dementia and is poor historian  PLOF obtained from CM note  Will follow up with CM for further details of PLOF   Lifestyle   Autonomy PTA was receiving assistance with all ADLs, IADLs, and was using cane for mobility/transfers   Service to Others retired   [address] enjoys watching tv   Psychosocial   Psychosocial (WDL) WDL   ADL   Eating Assistance 4  Minimal Assistance   Eating Deficit Increased time to complete;Supervision/safety;Setup   Grooming Assistance 4  Minimal Assistance   UB Bathing Assistance 3  Moderate Assistance   LB [address] 2  Maximal Assistance   [address] 3  Moderate Assistance   LB Dressing Assistance 2  Maximal [address]  2  Maximal Assistance   Functional Assistance 3  Moderate Assistance   Functional Deficit   (x1 with RW)   Bed Mobility   Supine to Sit 4  Minimal assistance   Additional items Assist x 1;HOB elevated; Increased time required;Verbal cues;LE management   Additional Comments Received supine in bed with HOB elevated  Required Min A x 1 and use of VC for safety  Pt required assistance and VC to adjust hips at EOB   Transfers   Sit to Stand 3  Moderate assistance   Additional items Assist x 1; Increased time required;Verbal cues   Stand to Sit 3  Moderate assistance   Additional items Assist x 1; Increased time required;Verbal cues   Additional Comments Performed sit to stand from EOB and stand to sit into chair  Pt used RW and Mod A x 1 to steady balance  Pt left up in chair with feet up and chair alarm on  Functional Mobility   Functional Mobility 3  Moderate assistance   Additional Comments Used Mod A x 1 and RW to steady balance   Requirec VC for safety, sequencing, and RW mgmt   Additional items Rolling walker   Balance   Static Sitting Fair   Dynamic Sitting Fair -   Static Standing Poor   Dynamic Standing Poor Ambulatory Poor   Activity Tolerance   Activity Tolerance Patient limited by fatigue;Patient limited by pain; Other (Comment)  (limited by R heel wound and cognition)   Medical Staff Made Aware PT Mayuri Antonio   Nurse Made Aware yes   RUE Assessment   RUE Assessment WFL   LUE Assessment   LUE Assessment WFL   Hand Function   Gross Motor Coordination Functional   Fine Motor Coordination Functional   Sensation   Light Touch No apparent deficits   Cognition   Overall Cognitive Status Impaired   Arousal/Participation Alert; Responsive; Cooperative   Attention Attends with cues to redirect   Orientation Level Oriented to person   Memory Decreased long term memory;Decreased recall of biographical information;Decreased short term memory;Decreased recall of recent events;Decreased recall of precautions   Following Commands Follows one step commands with increased time or repetition   Comments Pt has baseline dementia and is poor historian  Pt demonstrates decreased safety awareness and limited insight to deficits  Requires VC for sequencing and redirection to task   Assessment   Limitation Decreased ADL status; Decreased Safe judgement during ADL;Decreased cognition;Decreased endurance;Decreased self-care trans;Decreased high-level ADLs   Prognosis Fair   Assessment Pt is a 67 y/o female admitted to [facility] w/ R leg cellulitus  Pt's PMH includes: dementia, hypercholesterolemia, HTN, expressive aphasia, DM, HLD, impaired fasting glucose, OA, vitamin D deficiency  Pt seen for OT evaluation on 11/5/19 with active OOB, up with assist orders  Pt is WBAT on RLE with global ped heel off loading shoe  Due to baseline dementia, pt is poor historian  Per CM note, pt lives at [facility] in locked unit  At baseline, pt receives assistance with ADLs/IADLs and is Mod I with functional mobility with use of a cane   Currently, pt requires Mod A x 1 for UB ADLs and bed mobility, Max A x 1 LB ADLs, and Mod A x 1 with use of RW for functional transfers/mobility  Pt presents with the following impairments: decreased safety awareness, dementia, limited insight to deficits, cognitive deficits, decreased activity tolerance, decreased endurance, decreased sitting/standing tolerance, decreased sitting/standing balance, decreased trunk control, expressive aphasia, and pain  These deficits limit her ability to perform dressing, bathing, grooming, feeding, toileting, bed mobility, functional transfers/mobility, community mobility, and leisure pursuits  Based on the above mentioned deficits and ongoing limitations, would consider pt to be a high complexity evaluation  Pt scored 35/100 on the Barthel Index  Likely able to return home to previous environment  Unclear how much assistance provided by facility  Will need to speak with CM to clarify prior level of care and ensure appropriate care will be given to pt  Upon d/c, OT recommends return home to [facility] locked unit,once medically stable  Pt will continue to be seen for skilled OT 3-5x/wk during LOS to achieve listed goals  Goals   Patient Goals unable to state at this time due to dementia  Will continue to assess   LTG Time Frame 7-10   Long Term Goal #1 see below listed goals   Plan   Treatment Interventions ADL retraining;Functional transfer training; Endurance training;Cognitive reorientation;Patient/family training;Equipment evaluation/education; Compensatory technique education;Continued evaluation; Energy conservation; Activityengagement   Goal Expiration Date 11/15/19   OT Frequency 3-5x/wk   Recommendation   OT Discharge Recommendation Other (Comment)  (Return to [facility] pending clarification of prior home setup)   OT - OK to Discharge Yes  (once medically stable)   Barthel Index   Feeding 5   Bathing 0   Grooming Score 0   Dressing Score 5   Bladder Score 5   Bowels Score 10   Toilet Use Score 5   Transfers (Bed/Chair) Score 5   Mobility (Level Surface) Score 0   Stairs Score 0   Barthel Index Score 35   Modified Morris Scale   Modified Morris Scale 4     GOALS  Pt will perform bed mobility with supervision and use of AE/DME as needed to increase functional independence for engagement in meaningful activities  Pt will perform functional transfers with supervision and use of DME as needed to increase functional independence to promote participation in ADLs/IADLs  Pt will perform functional mobility with supervision and use of AE/DME as needed to increase functional independence for engagement in meaningful activities  Pt will complete all UB ADLs with Min A x 1 and use of AE/DME as needed to increase independence with ADL performance  Pt will complete all LB ADLs with Mod A x 1 and use of AE/DME as needed to increase independence with ADL performance  Pt will complete toileting with Mod A x 1 and use of AE/DME as needed to increase functional independence  Pt will be AOx4 75% of the time to improve overall engagement in therapeutic activities  Pt will stand for approximately 10 min to complete ADL task with supervision and use of AE/DME as needed to improve standing tolerance for sink-level ADLs  Pt will tolerate 25-30 min skilled OT session with no rest breaks to improve overall activity tolerance for increased participation in functional activities  Pt will sit EOB for 10 min with supervision and with G sitting balance and postural control to improve dynamic sitting balance for increased independence with EOB activities  Pt will follow all one-step commands 100% of the time with no more than 1 VC for repeated directions to improve participation during therapeutic activity        Lizbeth Oar, OTS

## 2019-11-06 NOTE — ASSESSMENT & PLAN NOTE
· Patient presented to the ED on 11/3/19 with right leg cellulitis and right heel wound  · Extent of cellulitis was almost up to the knee and was marked to see progression - has improved based on admission markings  · Podiatry consult appreciated  De-escalate IV abx  Cefepime was discontinued prior  MRSA swab negative and wound culture with 3+ gram negative rods and 2+ gram positive cocci in pairs  Place on IV Ancef  · Right foot XR revealed dorsal skin ulceration and subcutaneous edema consistent with cellulitis with no acute osseous abnormality or radiographic evidence of osteomyelitis per the result report  · Will treat with antibiotic for 10 days total, blood cultures were negative  She will need debridement per Podiarty  Will change to PO after that  She may be discharged after the procedure and from recommendations from Podiatry

## 2019-11-06 NOTE — PROGRESS NOTES
Progress Note - Jaclyn Everett 1945, 66 y o  female MRN: [id number]    Unit/Bed#: -01 Encounter: [id number]    Primary Care Provider: Rose Au MD   Date and time admitted to hospital: 11/3/2019 10:54 AM        Leukocytosis  Assessment & Plan  · Likely secondary to the above  Improving  · IV abx as above  · Blood cultures preliminarily negative x2 at 24 hours  · Blister culture as above  · CBC in AM    Essential hypertension  Assessment & Plan  · Blood pressure stable  · Continue lisinopril      Frontotemporal dementia without behavioral disturbance (HCC)  Assessment & Plan  · Continue rivastigmine  · Fall precautions  · Patient comes from locked dementia unit  · PT/OT consults pending for discharge planning     * Cellulitis of right leg  Assessment & Plan  · Patient presented to the ED on 11/3/19 with right leg cellulitis and right heel wound  · Extent of cellulitis was almost up to the knee and was marked to see progression - has improved based on admission markings  · Podiatry consult appreciated  De-escalate IV abx  Cefepime was discontinued prior  MRSA swab negative and wound culture with 3+ gram negative rods and 2+ gram positive cocci in pairs  Place on IV Ancef  · Right foot XR revealed dorsal skin ulceration and subcutaneous edema consistent with cellulitis with no acute osseous abnormality or radiographic evidence of osteomyelitis per the result report  · Will treat with antibiotic for 10 days total, blood cultures were negative  She will need debridement per Podiarty  Will change to PO after that  She may be discharged after the procedure and from recommendations from Podiatry  VTE Pharmacologic Prophylaxis:   Pharmacologic: Heparin  Mechanical VTE Prophylaxis in Place: Yes    Patient Centered Rounds: I have performed bedside rounds with nursing staff today      Discussions with Specialists or Other Care Team Provider: podiatry    Education and Discussions with Family / Patient: patient     Time Spent for Care: 45 minutes  More than 50% of total time spent on counseling and coordination of care as described above  Current Length of Stay: 3 day(s)    Current Patient Status: Inpatient   Certification Statement: The patient will continue to require additional inpatient hospital stay due to foot infection    Discharge Plan: tomorrow    Code Status: Level 1 - Full Code      Subjective:   No complaints of pain  She is eating well  Answered all questions per family at bedside  Objective:     Vitals:   Temp (24hrs), Av 5 °F (36 9 °C), Min:98 4 °F (36 9 °C), Max:98 6 °F (37 °C)    Temp:  [98 4 °F (36 9 °C)-98 6 °F (37 °C)] 98 4 °F (36 9 °C)  HR:  [81-85] 82  Resp:  [16-18] 16  BP: (117-149)/(57-75) 141/75  SpO2:  [96 %-97 %] 97 %  Body mass index is 25 2 kg/m²  Input and Output Summary (last 24 hours): Intake/Output Summary (Last 24 hours) at 2019 1544  Last data filed at 2019 1300  Gross per 24 hour   Intake 780 ml   Output 762 ml   Net 18 ml       Physical Exam:     Physical Exam   Constitutional: She is oriented to person, place, and time  She appears well-developed and well-nourished  HENT:   Head: Normocephalic and atraumatic  Right Ear: External ear normal    Left Ear: External ear normal    Nose: Nose normal    Mouth/Throat: Oropharynx is clear and moist  No oropharyngeal exudate  Eyes: Pupils are equal, round, and reactive to light  Conjunctivae and EOM are normal  Right eye exhibits no discharge  Left eye exhibits no discharge  No scleral icterus  Neck: Normal range of motion  Neck supple  No JVD present  No tracheal deviation present  No thyromegaly present  Cardiovascular: Normal rate, regular rhythm, normal heart sounds and intact distal pulses  Exam reveals no gallop and no friction rub  No murmur heard  Pulmonary/Chest: Effort normal and breath sounds normal  No stridor  No respiratory distress  She has no wheezes  She has no rales  She exhibits no tenderness  Abdominal: Soft  Bowel sounds are normal  She exhibits distension  She exhibits no mass  There is no tenderness  There is no rebound and no guarding  Musculoskeletal: Normal range of motion  She exhibits edema and deformity  She exhibits no tenderness  Lymphadenopathy:     She has no cervical adenopathy  Neurological: She is alert and oriented to person, place, and time  She displays normal reflexes  No cranial nerve deficit  She exhibits normal muscle tone  Coordination normal    Skin: Skin is warm and dry  No rash noted  No erythema  No pallor  Psychiatric: She has a normal mood and affect  Her behavior is normal  Judgment and thought content normal    Nursing note and vitals reviewed  Additional Data:     Labs:    Results from last 7 days   Lab Units 11/06/19  0541  11/04/19  0501   WBC Thousand/uL 8 72   < > 11 63*   HEMOGLOBIN g/dL 11 3*   < > 11 1*   HEMATOCRIT % 35 2   < > 34 5*   PLATELETS Thousands/uL 491*   < > 436*   NEUTROS PCT %  --   --  70   LYMPHS PCT %  --   --  17   MONOS PCT %  --   --  8   EOS PCT %  --   --  3    < > = values in this interval not displayed  Results from last 7 days   Lab Units 11/06/19  0541  11/03/19  1144   POTASSIUM mmol/L 4 2   < > 3 7   CHLORIDE mmol/L 109*   < > 105   CO2 mmol/L 24   < > 27   BUN mg/dL 13   < > 17   CREATININE mg/dL 0 67   < > 0 60   CALCIUM mg/dL 8 9   < > 9 3   ALK PHOS U/L  --   --  120*   ALT U/L  --   --  31   AST U/L  --   --  22    < > = values in this interval not displayed  Results from last 7 days   Lab Units 11/03/19  1144   INR  1 10       * I Have Reviewed All Lab Data Listed Above  * Additional Pertinent Lab Tests Reviewed:  [address] Admission Reviewed    Imaging:    Imaging Reports Reviewed Today Include:    Imaging Personally Reviewed by Myself Includes:      Recent Cultures (last 7 days):     Results from last 7 days   Lab Units 11/04/19  0959 11/03/19  1144   BLOOD CULTURE   --  No Growth at 48 hrs  No Growth at 48 hrs  GRAM STAIN RESULT  No polys seen*  3+ Gram negative rods*  2+ Gram positive cocci in pairs*  --    WOUND CULTURE  4+ Growth of Staphylococcus aureus*  3+ Growth of Diphtheroids  --        Last 24 Hours Medication List:     Current Facility-Administered Medications:  acetaminophen 650 mg Oral Q6H Makenzie Nobles MD    artificial tear  Both Eyes HS Kate Millan MD    calcium carbonate-vitamin D 1 tablet Oral Daily With Breakfast Kate Millan MD    cefazolin 2,000 mg Intravenous Q8H Jennifer Sheets PA-C Last Rate: Stopped (11/06/19 1250)   cyanocobalamin 1,000 mcg Oral Daily Katherine Mcgraw MD    docusate sodium 100 mg Oral Daily Kate Millan MD    heparin (porcine) 5,000 Units Subcutaneous Q8H [address] Kate Millan MD    lisinopril 5 mg Oral Daily Kate Millan MD    rivastigmine 4 5 mg Oral Daily Kate Millan MD    sodium chloride (PF) 3 mL Intravenous PRN Kate Millan MD         Today, Patient Was Seen By: Sonja Jones MD    ** Please Note: Dictation voice to text software may have been used in the creation of this document   **

## 2019-11-06 NOTE — PLAN OF CARE
Problem: OCCUPATIONAL THERAPY ADULT  Goal: Performs self-care activities at highest level of function for planned discharge setting  See evaluation for individualized goals  Description  Treatment Interventions: ADL retraining, Functional transfer training, Endurance training, Cognitive reorientation, Patient/family training, Equipment evaluation/education, Compensatory technique education, Continued evaluation, Energy conservation, Activityengagement          See flowsheet documentation for full assessment, interventions and recommendations  Note:   Limitation: Decreased ADL status, Decreased Safe judgement during ADL, Decreased cognition, Decreased endurance, Decreased self-care trans, Decreased high-level ADLs  Prognosis: Fair  Assessment: Pt is a 67 y/o female [facility] w/ R leg cellulitus  Pt's PMH includes: dementia, hypercholesterolemia, HTN, expressive aphasia, DM, HLD, impaired fasting glucose, OA, vitamin D deficiency  Pt seen for OT evaluation on 11/5/19 with active OOB, up with assist orders  Pt is WBAT on RLE with global ped heel off loading shoe  Due to baseline dementia, pt is poor historian  Per CM note, pt [facility] at [facility] in locked unit  At baseline, pt receives assistance with ADLs/IADLs and is Mod I with functional mobility with use of a cane  Currently, pt requires Mod A x 1 for UB ADLs and bed mobility, Max A x 1 LB ADLs, and Mod A x 1 with use of RW for functional transfers/mobility  Pt presents with the following impairments: decreased safety awareness, dementia, limited insight to deficits, cognitive deficits, decreased activity tolerance, decreased endurance, decreased sitting/standing tolerance, decreased sitting/standing balance, decreased trunk control, expressive aphasia, and pain  These deficits limit her ability to perform dressing, bathing, grooming, feeding, toileting, bed mobility, functional transfers/mobility, community mobility, and leisure pursuits   Based on the above mentioned deficits and ongoing limitations, would consider pt to be a high complexity evaluation  Pt scored 35/100 on the Barthel Index  Likely able to return home to previous environment  Unclear how much assistance provided by facility  Will need to speak with CM to clarify prior level of care and ensure appropriate care will be given to pt  Upon d/c, OT recommends return home to [facility] locked unit,once medically stable  Pt will continue to be seen for skilled OT 3-5x/wk during LOS to achieve listed goals       OT Discharge Recommendation: Other (Comment)(Return to [facility] pending clarification of prior home setup)  OT - OK to Discharge: Yes(once medically stable)     Sharon Guzmán, OTS

## 2019-11-06 NOTE — SOCIAL WORK
D/c pending, pt can return to [facility] at d/c  Pt reviewed in [facility] care rounds  Pt not medically stable for d/c  Possible 11/8

## 2019-11-06 NOTE — OCCUPATIONAL THERAPY NOTE
[address]  Treatment Note     Patient Name: Kassy Wells  [id number] Date: 11/6/2019  Problem List  Principal Problem:    Cellulitis of right leg  Active Problems:    Frontotemporal dementia without behavioral disturbance ([facility] [address] )    Essential hypertension    Leukocytosis       Time: 7248-4140       11/06/19 1010   Restrictions/Precautions   Weight Bearing Precautions Per Order Yes   RLE Weight Bearing Per Order WBAT   Braces or Orthoses Other (Comment)  (global ped heel off loading shoe)   Other Precautions Cognitive; Chair Alarm; Bed Alarm;WBS;Fall Risk  (baseline dementia)   Pain Assessment   Pain Assessment No/denies pain   Pain Score No Pain   ADL   Where Assessed Chair   Eating Assistance 5  Supervision/Setup   Grooming Assistance 4  Minimal Assistance   LB Dressing Assistance 2  Maximal Assistance   Toileting Comments incontinent of bladder on arrival   Functional Standing Tolerance   Time 1 min   Activity functional transfers w/ RW   Comments with RW   Bed Mobility   Supine to Sit 3  Moderate assistance   Additional items Assist x 1;HOB elevated; Bedrails; Increased time required;Verbal cues;LE management   Transfers   Sit to Stand 3  Moderate assistance   Additional items Assist x 2; Increased time required;Verbal cues   Stand to Sit 3  Moderate assistance   Additional items Assist x 2; Increased time required;Verbal cues   Stand pivot 3  Moderate assistance   Additional items Assist x 2; Increased time required;Verbal cues   Additional Comments oob to chair w/ alarm on + active at end of session  cues for sequencing/processing   Cognition   Overall Cognitive Status Impaired   Arousal/Participation Alert; Responsive; Cooperative   Attention Attends with cues to redirect   Orientation Level Oriented to person;Disoriented to place; Disoriented to time;Disoriented to situation   Memory Decreased recall of precautions;Decreased recall of recent events;Decreased short term memory;Decreased recall of biographical information   Following Commands Follows one step commands with increased time or repetition   Comments alejandra, pleasant, cooperative   Activity Tolerance   Activity Tolerance Patient limited by fatigue   Medical Staff Made Aware ok to see per RN Merlinda Hollering   Assessment   Assessment Patient participated in Skilled OT session this date with interventions consisting of ADL re training with the use of correct body mechnaics, Energy Conservation techniques, safety awareness and fall prevention techniques, therapeutic exercise to: increase functional use of BUEs, increase BUE muscle strength ,  therapeutic activities to: increase activity tolerance, increase dynamic sit/ stand balance during functional activity , increase postural control and increase OOB/ sitting tolerance   Patient agreeable to OT treatment session, upon arrival patient was found supine in bed  In comparison to previous session, patient with improvements in activity tolerance, strengthening, ADLs  Patient today requiring max cues for sequencing, pacing and RW management  Compared to yesterday's therapy session, patient required mod A x2 with RW for transfers  Unable to stand w/ Ax1 multiple trials  Patient w/ RLE global ped heel off loading shoe on  Max A for LB ADLS at this time  Pt incontinent of urine when stood from EOB, PCA present took jade pads in bag as team needs to measure urine  Patient limited by stand tolerance, balance, activity tolerance, strength, overall ADL participation and cognition at this time (baseline dementia and poor historian)  No additional home setup able to be gathered today as no family/friends present during OT tx  Pt participated in various therapeutic exercises and functional activities with emphasis on BUE strengthening, and BUE use during functional tasks  Performed these seated EOB for 10 mins w/ S to min A required for dynamic unsupported sitting balance    Patient continues to be functioning below baseline level, occupational performance remains limited secondary to factors listed above and increased risk for falls and injury  From OT standpoint, recommendation at time of d/c would be return to facility with appropriate support and home OT  Patient to benefit from continued Occupational Therapy treatment while in the hospital to address deficits as defined above and maximize level of functional independence with ADLs and functional mobility  From OT standpoint, recommendation at time of d/c would be short term rehab  Pt to benefit from continued OT while in the hospital to address deficits as defined above and maximize level of functional independence with occupational performance of ADLs and functional mobility  At end of session, patient remains in room with all needs within reach     Plan   Goal Expiration Date 11/15/19   OT Treatment Day 1   OT Frequency 3-5x/wk   Recommendation   OT Discharge Recommendation Short Term Rehab   OT - OK to Discharge Yes  (to rehab when medically stable)       Alexsandra Borrego MS, OTR/L

## 2019-11-06 NOTE — PLAN OF CARE
Problem: Prexisting or High Potential for Compromised Skin Integrity  Goal: Skin integrity is maintained or improved  Description  INTERVENTIONS:  - Identify patients at risk for skin breakdown  - Assess and monitor skin integrity  - Assess and monitor nutrition and hydration status  - Monitor labs   - Assess for incontinence   - Turn and reposition patient  - Assist with mobility/ambulation  - Relieve pressure over bony prominences  - Avoid friction and shearing  - Provide appropriate hygiene as needed including keeping skin clean and dry  - Evaluate need for skin moisturizer/barrier cream  - Collaborate with interdisciplinary team   - Patient/family teaching  - Consider wound care consult   Outcome: Progressing     Problem: Potential for Falls  Goal: Patient will remain free of falls  Description  INTERVENTIONS:  - Assess patient frequently for physical needs  -  Identify cognitive and physical deficits and behaviors that affect risk of falls    -  Lester fall precautions as indicated by assessment   - Educate patient/family on patient safety including physical limitations  - Instruct patient to call for assistance with activity based on assessment  - Modify environment to reduce risk of injury  - Consider OT/PT consult to assist with strengthening/mobility  Outcome: Progressing

## 2019-11-07 LAB
ANION GAP SERPL CALCULATED.3IONS-SCNC: 5 MMOL/L (ref 4–13)
BACTERIA WND AEROBE CULT: ABNORMAL
BACTERIA WND AEROBE CULT: ABNORMAL
BASOPHILS # BLD AUTO: 0.06 THOUSANDS/ΜL (ref 0–0.1)
BASOPHILS NFR BLD AUTO: 1 % (ref 0–1)
BUN SERPL-MCNC: 14 MG/DL (ref 5–25)
CALCIUM SERPL-MCNC: 9.1 MG/DL (ref 8.3–10.1)
CHLORIDE SERPL-SCNC: 108 MMOL/L (ref 100–108)
CO2 SERPL-SCNC: 27 MMOL/L (ref 21–32)
CREAT SERPL-MCNC: 0.57 MG/DL (ref 0.6–1.3)
EOSINOPHIL # BLD AUTO: 0.37 THOUSAND/ΜL (ref 0–0.61)
EOSINOPHIL NFR BLD AUTO: 4 % (ref 0–6)
ERYTHROCYTE [DISTWIDTH] IN BLOOD BY AUTOMATED COUNT: 12.7 % (ref 11.6–15.1)
GFR SERPL CREATININE-BSD FRML MDRD: 90 ML/MIN/1.73SQ M
GLUCOSE SERPL-MCNC: 96 MG/DL (ref 65–140)
GRAM STN SPEC: ABNORMAL
HCT VFR BLD AUTO: 35 % (ref 34.8–46.1)
HGB BLD-MCNC: 11.1 G/DL (ref 11.5–15.4)
IMM GRANULOCYTES # BLD AUTO: 0.1 THOUSAND/UL (ref 0–0.2)
IMM GRANULOCYTES NFR BLD AUTO: 1 % (ref 0–2)
LYMPHOCYTES # BLD AUTO: 1.98 THOUSANDS/ΜL (ref 0.6–4.47)
LYMPHOCYTES NFR BLD AUTO: 20 % (ref 14–44)
MCH RBC QN AUTO: 28.5 PG (ref 26.8–34.3)
MCHC RBC AUTO-ENTMCNC: 31.7 G/DL (ref 31.4–37.4)
MCV RBC AUTO: 90 FL (ref 82–98)
MONOCYTES # BLD AUTO: 0.61 THOUSAND/ΜL (ref 0.17–1.22)
MONOCYTES NFR BLD AUTO: 6 % (ref 4–12)
NEUTROPHILS # BLD AUTO: 6.7 THOUSANDS/ΜL (ref 1.85–7.62)
NEUTS SEG NFR BLD AUTO: 68 % (ref 43–75)
NRBC BLD AUTO-RTO: 0 /100 WBCS
PLATELET # BLD AUTO: 483 THOUSANDS/UL (ref 149–390)
PMV BLD AUTO: 9.8 FL (ref 8.9–12.7)
POTASSIUM SERPL-SCNC: 4 MMOL/L (ref 3.5–5.3)
RBC # BLD AUTO: 3.9 MILLION/UL (ref 3.81–5.12)
SODIUM SERPL-SCNC: 140 MMOL/L (ref 136–145)
WBC # BLD AUTO: 9.82 THOUSAND/UL (ref 4.31–10.16)

## 2019-11-07 PROCEDURE — 99223 1ST HOSP IP/OBS HIGH 75: CPT | Performed by: INTERNAL MEDICINE

## 2019-11-07 PROCEDURE — 99233 SBSQ HOSP IP/OBS HIGH 50: CPT | Performed by: INTERNAL MEDICINE

## 2019-11-07 PROCEDURE — 80048 BASIC METABOLIC PNL TOTAL CA: CPT | Performed by: INTERNAL MEDICINE

## 2019-11-07 PROCEDURE — 85025 COMPLETE CBC W/AUTO DIFF WBC: CPT | Performed by: INTERNAL MEDICINE

## 2019-11-07 PROCEDURE — 97110 THERAPEUTIC EXERCISES: CPT

## 2019-11-07 PROCEDURE — 97116 GAIT TRAINING THERAPY: CPT

## 2019-11-07 RX ORDER — CEFAZOLIN SODIUM 1 G/50ML
1000 SOLUTION INTRAVENOUS EVERY 8 HOURS
Status: COMPLETED | OUTPATIENT
Start: 2019-11-07 | End: 2019-11-09

## 2019-11-07 RX ADMIN — CEFAZOLIN SODIUM 1000 MG: 1 SOLUTION INTRAVENOUS at 21:20

## 2019-11-07 RX ADMIN — LISINOPRIL 5 MG: 5 TABLET ORAL at 09:38

## 2019-11-07 RX ADMIN — CYANOCOBALAMIN TAB 500 MCG 1000 MCG: 500 TAB at 09:38

## 2019-11-07 RX ADMIN — ACETAMINOPHEN 650 MG: 325 TABLET ORAL at 23:28

## 2019-11-07 RX ADMIN — Medication 1 TABLET: at 09:39

## 2019-11-07 RX ADMIN — RIVASTIGMINE TARTRATE 4.5 MG: 3 CAPSULE ORAL at 09:39

## 2019-11-07 RX ADMIN — CEFAZOLIN SODIUM 2000 MG: 2 SOLUTION INTRAVENOUS at 06:03

## 2019-11-07 RX ADMIN — ACETAMINOPHEN 650 MG: 325 TABLET ORAL at 12:06

## 2019-11-07 RX ADMIN — HEPARIN SODIUM 5000 UNITS: 5000 INJECTION INTRAVENOUS; SUBCUTANEOUS at 21:18

## 2019-11-07 RX ADMIN — HEPARIN SODIUM 5000 UNITS: 5000 INJECTION INTRAVENOUS; SUBCUTANEOUS at 06:03

## 2019-11-07 RX ADMIN — WHITE PETROLATUM 57.7 %-MINERAL OIL 31.9 % EYE OINTMENT: at 21:18

## 2019-11-07 RX ADMIN — CEFAZOLIN SODIUM 2000 MG: 2 SOLUTION INTRAVENOUS at 14:12

## 2019-11-07 RX ADMIN — DOCUSATE SODIUM 100 MG: 100 CAPSULE, LIQUID FILLED ORAL at 09:38

## 2019-11-07 RX ADMIN — ACETAMINOPHEN 650 MG: 325 TABLET ORAL at 17:46

## 2019-11-07 RX ADMIN — ACETAMINOPHEN 650 MG: 325 TABLET ORAL at 06:03

## 2019-11-07 RX ADMIN — HEPARIN SODIUM 5000 UNITS: 5000 INJECTION INTRAVENOUS; SUBCUTANEOUS at 14:12

## 2019-11-07 NOTE — PROGRESS NOTES
Pastoral Care Progress Note    2019  Patient: Ryan Kendrick : 1945  Admission Date & Time: 11/3/2019 1054  MRN: [id number] [facility]: [id number]      Relationship Building: Hospitality  Patient was appreciative the  was able to get her fresh water and prop a pillow behind her head            Chaplaincy Outcomes Achieved:  Distress reduced    Patient grateful for  support      Spiritual Coping Strategies Utilized:   Connectedness       19 1400   Clinical Encounter Type   Visited With Patient   Routine Visit Introduction   Continue Visiting Yes   Mormon Encounters   Mormon Needs Prayer

## 2019-11-07 NOTE — PHYSICAL THERAPY NOTE
Physical Therapy Progress Note        Maddie Patel, PTA       11/07/19 8882   Pain Assessment   Pain Assessment 0-10   Restrictions/Precautions   Weight Bearing Precautions Per Order Yes   RLE Weight Bearing Per Order WBAT   Braces or Orthoses   (globo ped heel off loading shoe )   Other Precautions Chair Alarm;Cognitive; Bed Alarm; Fall Risk;WBS   Cognition   Overall Cognitive Status Impaired   Subjective   Subjective pt agreeable to perform skilled PT   pt has baseline dementia    Bed Mobility   Supine to Sit 3  Moderate assistance   Additional items Assist x 1;HOB elevated;Verbal cues   Transfers   Sit to Stand 3  Moderate assistance   Additional items Assist x 2; Increased time required   Stand to Sit 3  Moderate assistance   Additional items Assist x 2   Stand pivot 3  Moderate assistance   Additional items Assist x 2; Increased time required;Verbal cues   Ambulation/Elevation   Gait pattern Narrow MER; Improper Weight shift; Excessively slow; Inconsistent ifrah   Gait Assistance 3  Moderate assist   Additional items Assist x 1;Verbal cues   Assistive Device Rolling walker   Distance 5   Stair Management Assistance Not tested   Balance   Static Sitting Fair   Dynamic Sitting Fair -   Static Standing Poor   Dynamic Standing Poor   Ambulatory Poor   Endurance Deficit   Endurance Deficit Yes   Endurance Deficit Description fatigue and weakness    Activity Tolerance   Activity Tolerance Patient limited by fatigue;Patient limited by pain   Exercises   Quad Sets Supine;10 reps   Heelslides Supine;10 reps   Glute Sets Sitting;10 reps   Knee AROM Short Arc Quad Supine;20 reps   Knee AROM Long Arc Quad Sitting;15 reps   Ankle Pumps Sitting;15 reps   Assessment   Prognosis Good   Problem List Decreased strength;Decreased endurance;Decreased mobility; Decreased coordination;Decreased cognition; Impaired judgement; Impaired vision   Assessment pt perform skilled PT focus on inc functional bed mobility and gait training with vc's for safety , pt able to perform a little gait activities with RW walk to recliner with all needs in reach  and chair alarm on , pt will cont to need skilled PT to meet goals   Goals   Patient Goals none stated    STG Expiration Date 11/19/19   Plan   Treatment/Interventions Functional transfer training;LE strengthening/ROM; Therapeutic exercise; Endurance training;Cognitive reorientation;Patient/family training;Bed mobility;Gait training   Recommendation   Recommendation Other (Comment)  (return her facility )   Equipment Recommended Walker   PT - OK to Discharge Yes  (once medically ready  )

## 2019-11-07 NOTE — PROGRESS NOTES
Progress Note - Kassy Wells 1945, 66 y o  female MRN: [id number]    Unit/Bed#: -01 Encounter: [id number]    Primary Care Provider: Tiffani Flynn MD   Date and time admitted to hospital: 11/3/2019 10:54 AM        Leukocytosis  Assessment & Plan  · Likely secondary to the above  Improving  · IV abx as above  · Blood cultures preliminarily negative x2 at 24 hours  · Blister culture as above  · CBC in AM    Essential hypertension  Assessment & Plan  · Blood pressure stable  · Continue lisinopril      Frontotemporal dementia without behavioral disturbance (HCC)  Assessment & Plan  · Continue rivastigmine  · Fall precautions  · Patient comes from locked dementia unit  · PT/OT consults pending for discharge planning     * Cellulitis of right leg  Assessment & Plan  · Patient presented to the ED on 11/3/19 with right leg cellulitis and right heel wound  · Extent of cellulitis was almost up to the knee and was marked to see progression - has improved based on admission markings  · Podiatry consult appreciated  De-escalate IV abx  Cefepime was discontinued prior  MRSA swab negative and wound culture with 3+ gram negative rods and 2+ gram positive cocci in pairs  Place on IV Ancef  · Right foot XR revealed dorsal skin ulceration and subcutaneous edema consistent with cellulitis with no acute osseous abnormality or radiographic evidence of osteomyelitis per the result report  · Will treat with antibiotic for 10 days total, blood cultures were negative  She will need debridement per Pod[facility]  Will change to PO after that  She may be discharged after the procedure and from recommendations from Podiatry  VTE Pharmacologic Prophylaxis:   Pharmacologic: Heparin  Mechanical VTE Prophylaxis in Place: Yes    Patient Centered Rounds: I have performed bedside rounds with nursing staff today      Discussions with Specialists or Other Care Team Provider: ID    Education and Discussions with Family / Patient: patient     Time Spent for Care: 45 minutes  More than 50% of total time spent on counseling and coordination of care as described above  Current Length of Stay: 4 day(s)    Current Patient Status: Inpatient   Certification Statement: The patient will continue to require additional inpatient hospital stay due to cellulitis    Discharge Plan: possible after debridement    Code Status: Level 1 - Full Code      Subjective:        Objective:     Vitals:   Temp (24hrs), Av 2 °F (36 8 °C), Min:98 1 °F (36 7 °C), Max:98 2 °F (36 8 °C)    Temp:  [98 1 °F (36 7 °C)-98 2 °F (36 8 °C)] 98 1 °F (36 7 °C)  HR:  [80-83] 80  Resp:  [16-18] 18  BP: (119-125)/(63-69) 122/69  SpO2:  [92 %-93 %] 93 %  Body mass index is 25 2 kg/m²  Input and Output Summary (last 24 hours): Intake/Output Summary (Last 24 hours) at 2019 1546  Last data filed at 2019 1302  Gross per 24 hour   Intake 600 ml   Output 0 ml   Net 600 ml       Physical Exam:     Physical Exam   Constitutional: She is oriented to person, place, and time  She appears well-developed and well-nourished  HENT:   Head: Normocephalic and atraumatic  Right Ear: External ear normal    Left Ear: External ear normal    Nose: Nose normal    Mouth/Throat: Oropharynx is clear and moist  No oropharyngeal exudate  Eyes: Pupils are equal, round, and reactive to light  Conjunctivae and EOM are normal  Right eye exhibits no discharge  Left eye exhibits no discharge  No scleral icterus  Neck: Normal range of motion  Neck supple  No JVD present  No tracheal deviation present  No thyromegaly present  Cardiovascular: Normal rate, regular rhythm, normal heart sounds and intact distal pulses  Exam reveals no gallop and no friction rub  No murmur heard  Pulmonary/Chest: Effort normal and breath sounds normal  No stridor  She has no wheezes  She has no rales  She exhibits no tenderness  Abdominal: Soft   Bowel sounds are normal  She exhibits no distension and no mass  There is no tenderness  There is no rebound and no guarding  No hernia  Musculoskeletal: Normal range of motion  She exhibits edema  She exhibits no tenderness or deformity  Lymphadenopathy:     She has no cervical adenopathy  Neurological: She is alert and oriented to person, place, and time  She displays normal reflexes  No cranial nerve deficit or sensory deficit  She exhibits normal muscle tone  Coordination normal    Skin: Skin is warm and dry  No rash noted  No erythema  No pallor  Psychiatric: She has a normal mood and affect  Her behavior is normal  Judgment and thought content normal    Nursing note and vitals reviewed  Additional Data:     Labs:    Results from last 7 days   Lab Units 11/07/19  0604   WBC Thousand/uL 9 82   HEMOGLOBIN g/dL 11 1*   HEMATOCRIT % 35 0   PLATELETS Thousands/uL 483*   NEUTROS PCT % 68   LYMPHS PCT % 20   MONOS PCT % 6   EOS PCT % 4     Results from last 7 days   Lab Units 11/07/19  0604  11/03/19  1144   POTASSIUM mmol/L 4 0   < > 3 7   CHLORIDE mmol/L 108   < > 105   CO2 mmol/L 27   < > 27   BUN mg/dL 14   < > 17   CREATININE mg/dL 0 57*   < > 0 60   CALCIUM mg/dL 9 1   < > 9 3   ALK PHOS U/L  --   --  120*   ALT U/L  --   --  31   AST U/L  --   --  22    < > = values in this interval not displayed  Results from last 7 days   Lab Units 11/03/19  1144   INR  1 10       * I Have Reviewed All Lab Data Listed Above  * Additional Pertinent [facility] Tests [facility]ed[facility]    Imaging:    Imaging Reports Reviewed Today Include:    Imaging Personally Reviewed by Myself Includes:      Recent Cultures (last 7 days):     Results from last 7 days   Lab Units 11/04/19  0959 11/03/19  1144   BLOOD CULTURE   --  No Growth at 72 hrs  No Growth at 72 hrs     GRAM STAIN RESULT  No polys seen*  3+ Gram negative rods*  2+ Gram positive cocci in pairs*  --    WOUND CULTURE  4+ Growth of Staphylococcus aureus*  3+ Growth of Diphtheroids  --        Last 24 Hours Medication List:     Current Facility-Administered Medications:  acetaminophen 650 mg Oral Q6H [address] Yesica Dillard MD    artificial tear  Both Eyes HS Yesica Dillard MD    calcium carbonate-vitamin D 1 tablet Oral Daily With Breakfast Yesica Dillard MD    cefazolin 2,000 mg Intravenous Q8H Jesus Schwartz PA-C Last Rate: 2,000 mg (11/07/19 1412)   cyanocobalamin 1,000 mcg Oral Daily Yesica Dillard MD    docusate sodium 100 mg Oral Daily Yesica Dillard MD    heparin (porcine) 5,000 Units Subcutaneous Q8H Amada Daly MD    lisinopril 5 mg Oral Daily Yesica Dillard MD    rivastigmine 4 5 mg Oral Daily Yesica Dillard MD    sodium chloride (PF) 3 mL Intravenous PRN Yesica Dillard MD         Today, Patient Was Seen By: Odalys Thomas MD    ** Please Note: Dictation voice to text software may have been used in the creation of this document   **

## 2019-11-07 NOTE — CONSULTS
Consultation - Infectious Disease   Virgilio Maxwell 66 y o  female MRN: [id number]  Unit/Bed#: -01 Encounter: [id number]      IMPRESSION & RECOMMENDATIONS:   Impression/Recommendations:  1  Right foot cellulitis  Secondary to #2  No associated fevers  Culture from heel showed MSSA  Admission blood cultures are negative  Continues to improve on IV cefazolin     -continue IV cefazolin  Decreased to 1 g Q 8 hours  -serial foot exams  -monitor temperatures and hemodynamics  -monitor CBC    2  Right heel ulceration  With associated blister which was the removed and sent for culture  Culture positive for MSSA  Podiatry evaluation noted with low clinical suspicion for deeper abscess or bone infection  X-ray with no evidence of osteomyelitis  There is significant necrosis which may need further excisional debridement     -antibiotic plan as above  -podiatry follow-up ongoing  -daily local wound care and dressing changes  -serial exams    3  Leukocytosis  Likely secondary to #1  No associated fevers  Hemodynamics remain stable  Blood cultures are negative  WBC count has normalized  -antibiotic plan as above  -monitor temperatures and hemodynamics  -monitor CBC  -follow-up blood cultures      4  Advanced dementia  Normally lives in a locked dementia unit  History is very limited  Antibiotics:  Cefazolin 3  Antibiotic 5    I discussed above plan with Dr Osmin Moura from primary service, and with patient  Thank you for this consultation  We will follow along with you  HISTORY OF PRESENT ILLNESS:  Reason for Consult: Foot cellulitis    HPI: Virgilio Maxwell is a 66 y o  female with history of diabetes, dementia who presented on 11/03/2019 from [facility] due to reported complaint of worsening right foot and leg pain and clinical concern for cellulitis  Patient noted to have a right heel wound    She was seen by Podiatry who performed bedside de elisabeth of a blister with small amount of purulence drained  This was sent for culture which is now positive for MSSA  Patient did have a leukocytosis of 16 on presentation  No fevers, chills, sweats or other reported systemic complaints  Patient was initially started on vancomycin which was changed to cefazolin  Cellulitis is resolving  Patient herself has no complaints today, but is a poor historian due to advanced dementia  REVIEW OF SYSTEMS:  A complete system-based review of systems is otherwise negative  PAST MEDICAL HISTORY:  Past Medical History:   Diagnosis Date    Dementia ([facility] )     Diabetes mellitus ([facility] )     Expressive aphasia     Hyperlipidemia     Hypertension     Impaired fasting glucose     Osteoarthritis     Vitamin D deficiency      Past Surgical History:   Procedure Laterality Date    JOINT REPLACEMENT      right knee       FAMILY HISTORY:  Non-contributory    SOCIAL HISTORY:  Social History     Substance and Sexual Activity   Alcohol Use Never    Frequency: Never     Social History     Substance and Sexual Activity   Drug Use Never     Social History     Tobacco Use   Smoking Status Never Smoker   Smokeless Tobacco Never Used       ALLERGIES:  No Known Allergies    MEDICATIONS:  All current active medications have been reviewed      PHYSICAL EXAM:  Vitals:  Temp:  [98 1 °F (36 7 °C)-98 6 °F (37 °C)] 98 6 °F (37 °C)  HR:  [80-86] 86  Resp:  [16-18] 16  BP: (119-141)/(63-69) 141/65  SpO2:  [92 %-98 %] 98 %  Temp (24hrs), Av 3 °F (36 8 °C), Min:98 1 °F (36 7 °C), Max:98 6 °F (37 °C)  Current: Temperature: 98 6 °F (37 °C)     Physical Exam:  General:  No acute distress, pleasantly confused, sitting comfortably in chair  Eyes:  Conjunctive clear with no hemorrhages or effusions  Oropharynx:  No ulcers, no lesions  Neck:  Supple, no lymphadenopathy  Lungs:  Clear to auscultation bilaterally, no accessory muscle use  Cardiac:  Regular rate and rhythm, no murmurs  Abdomen:  Soft, non-tender, non-distended  Extremities:  No peripheral cyanosis, clubbing, or edema  Skin:  No rashes  Right foot dressing intact  Media images personally reviewed  Right heel necrotic ulceration with no tunneling noted  Mild periwound erythema  No active drainage  Neurological:  Moves all four extremities spontaneously      LABS, IMAGING, & OTHER STUDIES:  Lab Results:  I have personally reviewed pertinent labs  Results from last 7 days   Lab Units 11/07/19  0604 11/06/19  0541 11/05/19  0506  11/03/19  1144   POTASSIUM mmol/L 4 0 4 2 4 0   < > 3 7   CHLORIDE mmol/L 108 109* 109*   < > 105   CO2 mmol/L 27 24 26   < > 27   BUN mg/dL 14 13 11   < > 17   CREATININE mg/dL 0 57* 0 67 0 55*   < > 0 60   EGFR ml/min/1 73sq m 90 86 91   < > 89   CALCIUM mg/dL 9 1 8 9 9 1   < > 9 3   AST U/L  --   --   --   --  22   ALT U/L  --   --   --   --  31   ALK PHOS U/L  --   --   --   --  120*    < > = values in this interval not displayed  Results from last 7 days   Lab Units 11/07/19  0604 11/06/19  0541 11/05/19  0506   WBC Thousand/uL 9 82 8 72 11 11*   HEMOGLOBIN g/dL 11 1* 11 3* 11 9   PLATELETS Thousands/uL 483* 491* 483*     Results from last 7 days   Lab Units 11/04/19  0959 11/03/19  1729 11/03/19  1144   BLOOD CULTURE   --   --  No Growth at 72 hrs  No Growth at 72 hrs  GRAM STAIN RESULT  No polys seen*  3+ Gram negative rods*  2+ Gram positive cocci in pairs*  --   --    WOUND CULTURE  4+ Growth of Staphylococcus aureus*  3+ Growth of Diphtheroids  --   --    MRSA CULTURE ONLY   --  No Methicillin Resistant Staphlyococcus aureus (MRSA) isolated  --        Imaging Studies:   I have personally reviewed pertinent imaging study reports and images in PACS  Right foot x-ray shows dorsal skin ulceration and subcutaneous edema consistent with cellulitis  No evidence of osteomyelitis  EKG, Pathology, and Other Studies:   I have personally reviewed pertinent reports

## 2019-11-07 NOTE — PLAN OF CARE
Problem: PHYSICAL THERAPY ADULT  Goal: Performs mobility at highest level of function for planned discharge setting  See evaluation for individualized goals  Description  Treatment/Interventions: Functional transfer training, LE strengthening/ROM, Therapeutic exercise, Endurance training, Cognitive reorientation, Patient/family training, Equipment eval/education, Bed mobility, Gait training, Spoke to nursing  Equipment Recommended: Doug Cramer       See flowsheet documentation for full assessment, interventions and recommendations  Note:   Prognosis: Good  Problem List: Decreased strength, Decreased endurance, Decreased mobility, Decreased coordination, Decreased cognition, Impaired judgement, Impaired vision  Assessment: pt perform skilled PT focus on inc functional bed mobility and gait training with vc's for safety , pt able to perform a little gait activities with RW walk to recliner with all needs in reach  and chair alarm on , pt will cont to need skilled PT to meet goals        Recommendation: Other (Comment)(return her facility )     PT - OK to Discharge: Yes(once medically ready  )    See flowsheet documentation for full assessment

## 2019-11-08 LAB
ANION GAP SERPL CALCULATED.3IONS-SCNC: 7 MMOL/L (ref 4–13)
BACTERIA BLD CULT: NORMAL
BACTERIA BLD CULT: NORMAL
BASOPHILS # BLD AUTO: 0.05 THOUSANDS/ΜL (ref 0–0.1)
BASOPHILS NFR BLD AUTO: 1 % (ref 0–1)
BUN SERPL-MCNC: 12 MG/DL (ref 5–25)
CALCIUM SERPL-MCNC: 9.2 MG/DL (ref 8.3–10.1)
CHLORIDE SERPL-SCNC: 110 MMOL/L (ref 100–108)
CO2 SERPL-SCNC: 26 MMOL/L (ref 21–32)
CREAT SERPL-MCNC: 0.56 MG/DL (ref 0.6–1.3)
EOSINOPHIL # BLD AUTO: 0.38 THOUSAND/ΜL (ref 0–0.61)
EOSINOPHIL NFR BLD AUTO: 4 % (ref 0–6)
ERYTHROCYTE [DISTWIDTH] IN BLOOD BY AUTOMATED COUNT: 12.6 % (ref 11.6–15.1)
GFR SERPL CREATININE-BSD FRML MDRD: 91 ML/MIN/1.73SQ M
GLUCOSE SERPL-MCNC: 99 MG/DL (ref 65–140)
HCT VFR BLD AUTO: 35.2 % (ref 34.8–46.1)
HGB BLD-MCNC: 11.3 G/DL (ref 11.5–15.4)
IMM GRANULOCYTES # BLD AUTO: 0.11 THOUSAND/UL (ref 0–0.2)
IMM GRANULOCYTES NFR BLD AUTO: 1 % (ref 0–2)
LYMPHOCYTES # BLD AUTO: 1.81 THOUSANDS/ΜL (ref 0.6–4.47)
LYMPHOCYTES NFR BLD AUTO: 18 % (ref 14–44)
MCH RBC QN AUTO: 28.5 PG (ref 26.8–34.3)
MCHC RBC AUTO-ENTMCNC: 32.1 G/DL (ref 31.4–37.4)
MCV RBC AUTO: 89 FL (ref 82–98)
MONOCYTES # BLD AUTO: 0.68 THOUSAND/ΜL (ref 0.17–1.22)
MONOCYTES NFR BLD AUTO: 7 % (ref 4–12)
NEUTROPHILS # BLD AUTO: 6.94 THOUSANDS/ΜL (ref 1.85–7.62)
NEUTS SEG NFR BLD AUTO: 69 % (ref 43–75)
NRBC BLD AUTO-RTO: 0 /100 WBCS
PLATELET # BLD AUTO: 425 THOUSANDS/UL (ref 149–390)
PMV BLD AUTO: 9.5 FL (ref 8.9–12.7)
POTASSIUM SERPL-SCNC: 4 MMOL/L (ref 3.5–5.3)
RBC # BLD AUTO: 3.96 MILLION/UL (ref 3.81–5.12)
SODIUM SERPL-SCNC: 143 MMOL/L (ref 136–145)
WBC # BLD AUTO: 9.97 THOUSAND/UL (ref 4.31–10.16)

## 2019-11-08 PROCEDURE — 80048 BASIC METABOLIC PNL TOTAL CA: CPT | Performed by: INTERNAL MEDICINE

## 2019-11-08 PROCEDURE — 99232 SBSQ HOSP IP/OBS MODERATE 35: CPT | Performed by: PODIATRIST

## 2019-11-08 PROCEDURE — 97535 SELF CARE MNGMENT TRAINING: CPT

## 2019-11-08 PROCEDURE — 99233 SBSQ HOSP IP/OBS HIGH 50: CPT | Performed by: INTERNAL MEDICINE

## 2019-11-08 PROCEDURE — 85025 COMPLETE CBC W/AUTO DIFF WBC: CPT | Performed by: INTERNAL MEDICINE

## 2019-11-08 PROCEDURE — 99232 SBSQ HOSP IP/OBS MODERATE 35: CPT | Performed by: INTERNAL MEDICINE

## 2019-11-08 RX ORDER — HEPARIN SODIUM 5000 [USP'U]/ML
INJECTION, SOLUTION INTRAVENOUS; SUBCUTANEOUS
Status: COMPLETED
Start: 2019-11-08 | End: 2019-11-08

## 2019-11-08 RX ORDER — CEFAZOLIN SODIUM 1 G/50ML
SOLUTION INTRAVENOUS
Status: COMPLETED
Start: 2019-11-08 | End: 2019-11-08

## 2019-11-08 RX ADMIN — DOCUSATE SODIUM 100 MG: 100 CAPSULE, LIQUID FILLED ORAL at 09:15

## 2019-11-08 RX ADMIN — HEPARIN SODIUM 5000 UNITS: 5000 INJECTION INTRAVENOUS; SUBCUTANEOUS at 13:10

## 2019-11-08 RX ADMIN — CEFAZOLIN SODIUM 1000 MG: 1 SOLUTION INTRAVENOUS at 13:09

## 2019-11-08 RX ADMIN — RIVASTIGMINE TARTRATE 4.5 MG: 3 CAPSULE ORAL at 09:15

## 2019-11-08 RX ADMIN — Medication 1 TABLET: at 09:15

## 2019-11-08 RX ADMIN — HEPARIN SODIUM 5000 UNITS: 5000 INJECTION INTRAVENOUS; SUBCUTANEOUS at 05:15

## 2019-11-08 RX ADMIN — ACETAMINOPHEN 650 MG: 325 TABLET ORAL at 05:16

## 2019-11-08 RX ADMIN — WHITE PETROLATUM 57.7 %-MINERAL OIL 31.9 % EYE OINTMENT: at 21:03

## 2019-11-08 RX ADMIN — ACETAMINOPHEN 650 MG: 325 TABLET ORAL at 23:11

## 2019-11-08 RX ADMIN — CEFAZOLIN SODIUM 1000 MG: 1 SOLUTION INTRAVENOUS at 21:08

## 2019-11-08 RX ADMIN — LISINOPRIL 5 MG: 5 TABLET ORAL at 09:15

## 2019-11-08 RX ADMIN — ACETAMINOPHEN 650 MG: 325 TABLET ORAL at 12:56

## 2019-11-08 RX ADMIN — ACETAMINOPHEN 650 MG: 325 TABLET ORAL at 17:21

## 2019-11-08 RX ADMIN — CYANOCOBALAMIN TAB 500 MCG 1000 MCG: 500 TAB at 09:16

## 2019-11-08 RX ADMIN — CEFAZOLIN SODIUM 1000 MG: 1 SOLUTION INTRAVENOUS at 05:22

## 2019-11-08 RX ADMIN — HEPARIN SODIUM 5000 UNITS: 5000 INJECTION INTRAVENOUS; SUBCUTANEOUS at 21:03

## 2019-11-08 NOTE — PROGRESS NOTES
Progress Note - Seema Colin 1945, 66 y o  female MRN: [id number]    Unit/Bed#: -01 Encounter: [id number]    Primary Care Provider: Jenny Caraballo MD   Date and time admitted to hospital: 11/3/2019 10:54 AM        Leukocytosis  Assessment & Plan  · Likely secondary to the above  Improving  · IV abx as above  · Blood cultures preliminarily negative x2 at 24 hours  · Blister culture as above  · CBC in AM    Essential hypertension  Assessment & Plan  · Blood pressure stable  · Continue lisinopril      Frontotemporal dementia without behavioral disturbance (HCC)  Assessment & Plan  · Continue rivastigmine  · Fall precautions  · Patient comes from locked dementia unit  · PT/OT consults pending for discharge planning     * Cellulitis of right leg  Assessment & Plan  · Patient presented to the ED on 11/3/19 with right leg cellulitis and right heel wound  · Extent of cellulitis was almost up to the knee and was marked to see progression - has improved based on admission markings  · Podiatry consult appreciated  De-escalate IV abx  Cefepime was discontinued prior  MRSA swab negative and wound culture with 3+ gram negative rods and 2+ gram positive cocci in pairs  Place on IV Ancef  · Right foot XR revealed dorsal skin ulceration and subcutaneous edema consistent with cellulitis with no acute osseous abnormality or radiographic evidence of osteomyelitis per the result report  · Will treat with antibiotic for 10 days total, blood cultures were negative  She will need debridement per Pod[facility]  Will change to PO after that  She may be discharged after the procedure and from recommendations from Podiatry  VTE Pharmacologic Prophylaxis:   Pharmacologic: Heparin  Mechanical VTE Prophylaxis in Place: Yes    Patient Centered Rounds: I have performed bedside rounds with nursing staff today      Discussions with Specialists or Other Care Team Provider: medicine    Education and Discussions with Family / Patient: patient     Time Spent for Care: 45 minutes  More than 50% of total time spent on counseling and coordination of care as described above  Current Length of Stay: 5 day(s)    Current Patient Status: Inpatient   Certification Statement: The patient will continue to require additional inpatient hospital stay due to cellulitis    Discharge Plan: being treated with antibiotics, podiatry to do debridement    Code Status: Level 1 - Full Code      Subjective:   Patient is pleasant today, no new complaints  Objective:     Vitals:   Temp (24hrs), Av 6 °F (37 °C), Min:98 2 °F (36 8 °C), Max:98 9 °F (37 2 °C)    Temp:  [98 2 °F (36 8 °C)-98 9 °F (37 2 °C)] 98 9 °F (37 2 °C)  HR:  [82-88] 88  Resp:  [16-18] 18  BP: (136-141)/(55-66) 137/59  SpO2:  [97 %-98 %] 97 %  Body mass index is 25 2 kg/m²  Input and Output Summary (last 24 hours): Intake/Output Summary (Last 24 hours) at 2019 1552  Last data filed at 2019 2200  Gross per 24 hour   Intake 66 67 ml   Output 595 ml   Net -528 33 ml       Physical Exam:     Physical Exam   Constitutional: She appears well-developed and well-nourished  HENT:   Head: Normocephalic and atraumatic  Right Ear: External ear normal    Left Ear: External ear normal    Nose: Nose normal    Mouth/Throat: Oropharynx is clear and moist  No oropharyngeal exudate  Eyes: Pupils are equal, round, and reactive to light  Conjunctivae and EOM are normal  Right eye exhibits no discharge  Left eye exhibits no discharge  No scleral icterus  Neck: Normal range of motion  Neck supple  No thyromegaly present  Cardiovascular: Normal rate, regular rhythm, normal heart sounds and intact distal pulses  Exam reveals no gallop and no friction rub  No murmur heard  Pulmonary/Chest: Effort normal and breath sounds normal  No stridor  No respiratory distress  She has no wheezes  She has no rales  She exhibits no tenderness  Abdominal: Soft   Bowel sounds are normal  She exhibits no distension and no mass  There is no tenderness  There is no rebound and no guarding  No hernia  Musculoskeletal: She exhibits edema and deformity  She exhibits no tenderness  Neurological: She is alert  She displays normal reflexes  No cranial nerve deficit or sensory deficit  She exhibits normal muscle tone  Coordination normal    Skin: Skin is warm and dry  No rash noted  No erythema  No pallor  Psychiatric: She has a normal mood and affect  Nursing note and vitals reviewed  Additional Data:     Labs:    Results from last 7 days   Lab Units 11/08/19  0551   WBC Thousand/uL 9 97   HEMOGLOBIN g/dL 11 3*   HEMATOCRIT % 35 2   PLATELETS Thousands/uL 425*   NEUTROS PCT % 69   LYMPHS PCT % 18   MONOS PCT % 7   EOS PCT % 4     Results from last 7 days   Lab Units 11/08/19  0551  11/03/19  1144   POTASSIUM mmol/L 4 0   < > 3 7   CHLORIDE mmol/L 110*   < > 105   CO2 mmol/L 26   < > 27   BUN mg/dL 12   < > 17   CREATININE mg/dL 0 56*   < > 0 60   CALCIUM mg/dL 9 2   < > 9 3   ALK PHOS U/L  --   --  120*   ALT U/L  --   --  31   AST U/L  --   --  22    < > = values in this interval not displayed  Results from last 7 days   Lab Units 11/03/19  1144   INR  1 10       * I Have Reviewed All Lab Data Listed Above  * Additional Pertinent Lab Tests Reviewed: [facility] Admission Reviewed    Imaging:    Imaging Reports Reviewed Today Include:    Imaging Personally Reviewed by Myself Includes:       Recent Cultures (last 7 days):     Results from last 7 days   Lab Units 11/04/19  0959 11/03/19  1144   BLOOD CULTURE   --  No Growth After 4 Days  No Growth After 4 Days     GRAM STAIN RESULT  No polys seen*  3+ Gram negative rods*  2+ Gram positive cocci in pairs*  --    WOUND CULTURE  4+ Growth of Staphylococcus aureus*  3+ Growth of Diphtheroids  --        Last 24 Hours Medication List:     Current Facility-Administered Medications:  acetaminophen 650 mg Oral Q6H [address] Katherine Lorna Zuniga MD    artificial tear  Both Eyes HS Joshua Griffin MD    calcium carbonate-vitamin D 1 tablet Oral Daily With Breakfast Joshua Griffin MD    cefazolin 1,000 mg Intravenous Q8H Anne Castillo DO Last Rate: 1,000 mg (11/08/19 1309)   cyanocobalamin 1,000 mcg Oral Daily Johsua Griffin MD    docusate sodium 100 mg Oral Daily Joshua Griffin MD    heparin (porcine) 5,000 Units Subcutaneous Q8H Kayleigh Paredes MD    lisinopril 5 mg Oral Daily Joshua Griffin MD    rivastigmine 4 5 mg Oral Daily Joshua Griffin MD    sodium chloride (PF) 3 mL Intravenous PRN Joshua Griffin MD         Today, Patient Was Seen By: Parker Harris MD    ** Please Note: Dictation voice to text software may have been used in the creation of this document   **

## 2019-11-08 NOTE — PROGRESS NOTES
[facility] Podiatry - Progress Note  Patient: Kary Toscano 66 y o  female   MRN: [id number]  PCP: Arturo Erickson MD  Unit/Bed#: MS Donis07 Encounter: [id number]  Date Of Visit: 19    ASSESSMENT:    Kary Toscano is a 66 y o  female with:    1  RLE cellulitis  2  Pressure ulcer of right heel - Unstageable      PLAN:    · No acute changes since last visit  Right heel wound appears stable at this time  May require excisional debridement  Continue local wound care with betadine WTD and Allevyn pad to right heel ulceration  · Abx per primary team   · WBAT  · Continue offloading BLE with use of Prevalon boots while nonambulatory  · Rest of care per primary team        SUBJECTIVE:     Chief Complaint:   Chief Complaint   Patient presents with    Foot Swelling     Pt was sent in by EMS for having increased swelling in her right foot  Pt c/o pain in right foot       The patient was seen, evaluated, and assessed at bedside today  The patient was awake, alert, and in no acute distress  The patient reports mild pain at this time  Pain is well controlled with current pain management regimen  Patient denies N/V/F/chills/SOB/CP  Review of Systems:    Constitutional: Negative  HENT: Negative  Eyes: Negative  Respiratory: Negative  Cardiovascular: Negative  Gastrointestinal: Negative  Musculoskeletal: Negative  Skin: Right heel ulceration  Neurological: Negative  Psych: Negative  OBJECTIVE:     Vitals:   /55   Pulse 83   Temp 98 2 °F (36 8 °C)   Resp 18   Ht 4' 11" (1 499 m)   Wt 56 6 kg (124 lb 12 5 oz)   SpO2 97%   BMI 25 20 kg/m²     Temp (24hrs), Av 5 °F (36 9 °C), Min:98 2 °F (36 8 °C), Max:98 7 °F (37 1 °C)      Physical Exam  General: Alert, cooperative and no distress  Lungs: Non labored breathing  Abdomen: Soft, non-tender  Extremity: NVS at baseline B/l  MSK function at baseline B/l  No calf tenderness noted B/l     Right lower extremity:  Wound appears stable since last seen  Wound is unstageable  Eshar stable with no drainage noted at this time  Wound edges are well demarcated with no deep sinus tracts not appreciated  No edema is noted  No proximal streaking is noted  No acute clinical signs of infection appreciated this time  Additional Data:     Labs:    Results from last 7 days   Lab Units 11/08/19  0551   WBC Thousand/uL 9 97   HEMOGLOBIN g/dL 11 3*   HEMATOCRIT % 35 2   PLATELETS Thousands/uL 425*   NEUTROS PCT % 69   LYMPHS PCT % 18   MONOS PCT % 7   EOS PCT % 4     Results from last 7 days   Lab Units 11/08/19  0551  11/03/19  1144   POTASSIUM mmol/L 4 0   < > 3 7   CHLORIDE mmol/L 110*   < > 105   CO2 mmol/L 26   < > 27   BUN mg/dL 12   < > 17   CREATININE mg/dL 0 56*   < > 0 60   CALCIUM mg/dL 9 2   < > 9 3   ALK PHOS U/L  --   --  120*   ALT U/L  --   --  31   AST U/L  --   --  22    < > = values in this interval not displayed  Results from last 7 days   Lab Units 11/03/19  1144   INR  1 10       * I Have Reviewed All Lab Data Listed Above  Recent Cultures (last 7 days):     Results from last 7 days   Lab Units 11/04/19  0959 11/03/19  1144   BLOOD CULTURE   --  No Growth After 4 Days  No Growth After 4 Days  GRAM STAIN RESULT  No polys seen*  3+ Gram negative rods*  2+ Gram positive cocci in pairs*  --    WOUND CULTURE  4+ Growth of Staphylococcus aureus*  3+ Growth of Diphtheroids  --        Imaging: I have personally reviewed pertinent films in PACS  EKG, Pathology, and Other Studies: I have personally reviewed pertinent reports  Today, Patient Was Seen By: Blayne Purvis DPM    ** Please Note: Portions of the record may have been created with voice recognition software  Occasional wrong word or "sound a like" substitutions may have occurred due to the inherent limitations of voice recognition software  Read the chart carefully and recognize, using context, where substitutions have occurred   **

## 2019-11-08 NOTE — PLAN OF CARE
Problem: OCCUPATIONAL THERAPY ADULT  Goal: Performs self-care activities at highest level of function for planned discharge setting  See evaluation for individualized goals  Description  Treatment Interventions: ADL retraining, Functional transfer training, Endurance training, Cognitive reorientation, Patient/family training, Equipment evaluation/education, Compensatory technique education, Continued evaluation, Energy conservation, Activityengagement          See flowsheet documentation for full assessment, interventions and recommendations  Outcome: Progressing  Note:   Limitation: Decreased ADL status, Decreased Safe judgement during ADL, Decreased cognition, Decreased endurance, Decreased self-care trans, Decreased high-level ADLs  Prognosis: Fair  Assessment: Patient participated in Skilled OT session this date with interventions consisting of ADL re training with the use of correct body mechanics,  therapeutic activities to: increase activity tolerance and increase standing tolerance time  Patient agreeable to OT treatment session, upon arrival patient was found supine in bed    Patient requiring verbal cues for safety and frequent rest periods  Patient continues to be functioning below baseline level, occupational performance remains limited secondary to factors listed above and increased risk for falls and injury  From OT standpoint, recommendation at time of d/c would be back to facility with prior level of assistance  Patient to benefit from continued Occupational Therapy treatment while in the hospital to address deficits as defined above and maximize level of functional independence with ADLs and functional mobility        OT Discharge Recommendation: (Return to facility with prior level of assistance)  OT - OK to Discharge: Yes(When medically appropriate )

## 2019-11-08 NOTE — OCCUPATIONAL THERAPY NOTE
OccupationalTherapy Progress Note     Patient Name: Jaclyn Everett  IQEWR'T Date: 11/8/2019  Problem List  Principal Problem:    Cellulitis of right leg  Active Problems:    Frontotemporal dementia without behavioral disturbance ([facility] [address] )    Essential hypertension    Leukocytosis     11/08/19 1440   Restrictions/Precautions   Weight Bearing Precautions Per Order Yes   RLE Weight Bearing Per Order WBAT   Braces or Orthoses Other (Comment)  (global ped heel off loading shoe)   Other Precautions Cognitive; Bed Alarm;Pain; Fall Risk   Lifestyle   [facility] PT was receiving assistance with all ADLs, IADLs, and was using cane for mobility/transfers   Service to Others retired   [address] enjoys watching tv   Pain Assessment   Pain Rating: FLACC (Rest) - Face 1   Pain Rating: FLACC (Rest) - Legs 0   Pain Rating: FLACC (Rest) - Activity 0   Pain Rating: FLACC (Rest) - Cry 0   Pain Rating: FLACC (Rest) - Consolability 0   Score: FLACC (Rest) 1   Pain Rating: FLACC (Activity) - Face 1   Pain Rating: FLACC (Activity) - Legs 0   Pain Rating: FLACC (Activity) - Activity 0   Pain Rating: FLACC (Activity) - Cry 0   Pain Rating: FLACC (Activity) - Consolability 1   Score: FLACC (Activity) 2   ADL   Where Assessed Edge of bed   LB Dressing Assistance 2  Maximal Assistance   LB Dressing Deficit Don/doff L sock; Don/doff R sock   Functional Standing Tolerance   Time ~1 minute   Activity static standing tolerance   Comments with RW   Bed Mobility   Supine to Sit 3  Moderate assistance   Additional items HOB elevated;Assist x 2;LE management; Increased time required   Sit to Supine 3  Moderate assistance   Additional items Assist x 2;HOB elevated; Increased time required;LE management   Transfers   Sit to Stand 3  Moderate assistance   Additional items Assist x 2;Verbal cues; Increased time required   Stand to Sit 3  Moderate assistance   Additional items Assist x 2; Increased time required;Verbal cues   Additional Comments Pt completed 3 STS transfers   Cognition   Overall Cognitive Status Impaired   Arousal/Participation Alert; Cooperative   Attention Attends with cues to redirect   Orientation Level Oriented to person;Disoriented to situation;Disoriented to time;Disoriented to place   Memory Decreased recall of precautions;Decreased recall of recent events   Following Commands Follows one step commands with increased time or repetition   Comments Pt is very pleasant and cooperative to work with therapy  Activity Tolerance   Activity Tolerance Patient limited by pain; Patient limited by fatigue   Medical Staff Made Aware RN confirmed okay to see and mobilize pt    Assessment   Assessment Patient participated in Skilled OT session this date with interventions consisting of ADL re training with the use of correct body mechanics,  therapeutic activities to: increase activity tolerance and increase standing tolerance time  Patient agreeable to OT treatment session, upon arrival patient was found supine in bed    Patient requiring verbal cues for safety and frequent rest periods  Patient continues to be functioning below baseline level, occupational performance remains limited secondary to factors listed above and increased risk for falls and injury  From OT standpoint, recommendation at time of d/c would be back to facility with prior level of assistance  Patient to benefit from continued Occupational Therapy treatment while in the hospital to address deficits as defined above and maximize level of functional independence with ADLs and functional mobility  Plan   Treatment Interventions ADL retraining;Functional transfer training; Endurance training;Cognitive reorientation;Patient/family training; Compensatory technique education; Activityengagement; Energy conservation   Goal Expiration Date 11/15/19   OT Treatment Day 2   OT Frequency 2-3x/wk   Recommendation   OT Discharge Recommendation   (Return to facility with prior level of assistance)   OT - OK to Discharge Yes  (When medically appropriate )   Barthel Index   Feeding 5   Bathing 0   Grooming Score 0   Dressing Score 5   Bladder Score 5   Bowels Score 5   Toilet Use Score 5   Transfers (Bed/Chair) Score 5   Mobility (Level Surface) Score 0   Stairs Score 0   Barthel Index Score 30   Modified Jason Scale   Modified [place] Scale 4     Coco Taylor, MOT, OTR/L

## 2019-11-08 NOTE — PLAN OF CARE
Problem: Prexisting or High Potential for Compromised Skin Integrity  Goal: Skin integrity is maintained or improved  Description  INTERVENTIONS:  - Identify patients at risk for skin breakdown  - Assess and monitor skin integrity  - Assess and monitor nutrition and hydration status  - Monitor labs   - Assess for incontinence   - Turn and reposition patient  - Assist with mobility/ambulation  - Relieve pressure over bony prominences  - Avoid friction and shearing  - Provide appropriate hygiene as needed including keeping skin clean and dry  - Evaluate need for skin moisturizer/barrier cream  - Collaborate with interdisciplinary team   - Patient/family teaching  - Consider wound care consult   Outcome: Progressing     Problem: Potential for Falls  Goal: Patient will remain free of falls  Description  INTERVENTIONS:  - Assess patient frequently for physical needs  -  Identify cognitive and physical deficits and behaviors that affect risk of falls    -  [place] fall precautions as indicated by assessment   - Educate patient/family on patient safety including physical limitations  - Instruct patient to call for assistance with activity based on assessment  - Modify environment to reduce risk of injury  - Consider OT/PT consult to assist with strengthening/mobility  Outcome: Progressing

## 2019-11-09 LAB
ANION GAP SERPL CALCULATED.3IONS-SCNC: 5 MMOL/L (ref 4–13)
BASOPHILS # BLD AUTO: 0.05 THOUSANDS/ΜL (ref 0–0.1)
BASOPHILS NFR BLD AUTO: 0 % (ref 0–1)
BUN SERPL-MCNC: 15 MG/DL (ref 5–25)
CALCIUM SERPL-MCNC: 9 MG/DL (ref 8.3–10.1)
CHLORIDE SERPL-SCNC: 109 MMOL/L (ref 100–108)
CO2 SERPL-SCNC: 26 MMOL/L (ref 21–32)
CREAT SERPL-MCNC: 0.62 MG/DL (ref 0.6–1.3)
EOSINOPHIL # BLD AUTO: 0.39 THOUSAND/ΜL (ref 0–0.61)
EOSINOPHIL NFR BLD AUTO: 4 % (ref 0–6)
ERYTHROCYTE [DISTWIDTH] IN BLOOD BY AUTOMATED COUNT: 12.7 % (ref 11.6–15.1)
GFR SERPL CREATININE-BSD FRML MDRD: 88 ML/MIN/1.73SQ M
GLUCOSE SERPL-MCNC: 100 MG/DL (ref 65–140)
HCT VFR BLD AUTO: 34.4 % (ref 34.8–46.1)
HGB BLD-MCNC: 10.7 G/DL (ref 11.5–15.4)
IMM GRANULOCYTES # BLD AUTO: 0.09 THOUSAND/UL (ref 0–0.2)
IMM GRANULOCYTES NFR BLD AUTO: 1 % (ref 0–2)
LYMPHOCYTES # BLD AUTO: 2.09 THOUSANDS/ΜL (ref 0.6–4.47)
LYMPHOCYTES NFR BLD AUTO: 19 % (ref 14–44)
MCH RBC QN AUTO: 28.2 PG (ref 26.8–34.3)
MCHC RBC AUTO-ENTMCNC: 31.1 G/DL (ref 31.4–37.4)
MCV RBC AUTO: 91 FL (ref 82–98)
MONOCYTES # BLD AUTO: 0.75 THOUSAND/ΜL (ref 0.17–1.22)
MONOCYTES NFR BLD AUTO: 7 % (ref 4–12)
NEUTROPHILS # BLD AUTO: 7.9 THOUSANDS/ΜL (ref 1.85–7.62)
NEUTS SEG NFR BLD AUTO: 69 % (ref 43–75)
NRBC BLD AUTO-RTO: 0 /100 WBCS
PLATELET # BLD AUTO: 443 THOUSANDS/UL (ref 149–390)
PMV BLD AUTO: 9.6 FL (ref 8.9–12.7)
POTASSIUM SERPL-SCNC: 4.1 MMOL/L (ref 3.5–5.3)
RBC # BLD AUTO: 3.8 MILLION/UL (ref 3.81–5.12)
SODIUM SERPL-SCNC: 140 MMOL/L (ref 136–145)
WBC # BLD AUTO: 11.27 THOUSAND/UL (ref 4.31–10.16)

## 2019-11-09 PROCEDURE — 80048 BASIC METABOLIC PNL TOTAL CA: CPT | Performed by: INTERNAL MEDICINE

## 2019-11-09 PROCEDURE — 99233 SBSQ HOSP IP/OBS HIGH 50: CPT | Performed by: INTERNAL MEDICINE

## 2019-11-09 PROCEDURE — 85025 COMPLETE CBC W/AUTO DIFF WBC: CPT | Performed by: INTERNAL MEDICINE

## 2019-11-09 RX ADMIN — HEPARIN SODIUM 5000 UNITS: 5000 INJECTION INTRAVENOUS; SUBCUTANEOUS at 05:28

## 2019-11-09 RX ADMIN — HEPARIN SODIUM 5000 UNITS: 5000 INJECTION INTRAVENOUS; SUBCUTANEOUS at 13:48

## 2019-11-09 RX ADMIN — DOCUSATE SODIUM 100 MG: 100 CAPSULE, LIQUID FILLED ORAL at 09:12

## 2019-11-09 RX ADMIN — ACETAMINOPHEN 650 MG: 325 TABLET ORAL at 12:00

## 2019-11-09 RX ADMIN — CYANOCOBALAMIN TAB 500 MCG 1000 MCG: 500 TAB at 09:12

## 2019-11-09 RX ADMIN — LISINOPRIL 5 MG: 5 TABLET ORAL at 09:12

## 2019-11-09 RX ADMIN — RIVASTIGMINE TARTRATE 4.5 MG: 3 CAPSULE ORAL at 09:12

## 2019-11-09 RX ADMIN — CEFAZOLIN SODIUM 1000 MG: 1 SOLUTION INTRAVENOUS at 13:49

## 2019-11-09 RX ADMIN — HEPARIN SODIUM 5000 UNITS: 5000 INJECTION INTRAVENOUS; SUBCUTANEOUS at 21:46

## 2019-11-09 RX ADMIN — WHITE PETROLATUM 57.7 %-MINERAL OIL 31.9 % EYE OINTMENT: at 21:47

## 2019-11-09 RX ADMIN — ACETAMINOPHEN 650 MG: 325 TABLET ORAL at 17:00

## 2019-11-09 RX ADMIN — ACETAMINOPHEN 650 MG: 325 TABLET ORAL at 05:28

## 2019-11-09 RX ADMIN — Medication 1 TABLET: at 08:30

## 2019-11-09 RX ADMIN — CEFAZOLIN SODIUM 1000 MG: 1 SOLUTION INTRAVENOUS at 05:28

## 2019-11-09 RX ADMIN — CEFAZOLIN SODIUM 1000 MG: 1 SOLUTION INTRAVENOUS at 21:46

## 2019-11-09 NOTE — PROGRESS NOTES
Progress Note - Julee Bass 1945, 66 y o  female MRN: [id number]    Unit/Bed#: -01 Encounter: [id number]    Primary Care Provider: Kev Chavez MD   Date and time admitted to hospital: 11/3/2019 10:54 AM        Leukocytosis  Assessment & Plan  · Likely secondary to the above  Improving  · IV abx as above  · Blood cultures preliminarily negative x2 at 24 hours  · Blister culture as above  · CBC in AM    Essential hypertension  Assessment & Plan  · Blood pressure stable  · Continue lisinopril      Frontotemporal dementia without behavioral disturbance (HCC)  Assessment & Plan  · Continue rivastigmine  · Fall precautions  · Patient comes from locked dementia unit  · PT/OT consults pending for discharge planning     * Cellulitis of right leg  Assessment & Plan  · Patient presented to the ED on 11/3/19 with right leg cellulitis and right heel wound  · Extent of cellulitis was almost up to the knee and was marked to see progression - has improved based on admission markings  · Podiatry consult appreciated  De-escalate IV abx  Cefepime was discontinued prior  MRSA swab negative and wound culture with 3+ gram negative rods and 2+ gram positive cocci in pairs  Place on IV Ancef  · Right foot XR revealed dorsal skin ulceration and subcutaneous edema consistent with cellulitis with no acute osseous abnormality or radiographic evidence of osteomyelitis per the result report  · Completed 7 days total of antibiotics per ID recommendations  It will be last day today, will check CBC and BMp in the morning  She will not have any procedure will need outpatient follow up with podiatry          VTE Pharmacologic Prophylaxis:   Pharmacologic: Heparin  Mechanical VTE Prophylaxis in Place: Yes    Patient Centered Rounds: I have performed bedside rounds with nursing staff today      Discussions with Specialists or Other Care Team Provider:     Education and Discussions with Family / Patient: patient's son called Time Spent for Care: 45 minutes  More than 50% of total time spent on counseling and coordination of care as described above  Current Length of Stay: 6 day(s)    Current Patient Status: Inpatient   Certification Statement: The patient will continue to require additional inpatient hospital stay due to completion of iv antibiotics and discharge planning for tomorrow  Discharge Plan: tomorrow    Code Status: Level 1 - Full Code      Subjective:   Patient has no questions today  She was eating her dinner  Objective:     Vitals:   Temp (24hrs), Av 6 °F (37 °C), Min:98 4 °F (36 9 °C), Max:98 9 °F (37 2 °C)    Temp:  [98 4 °F (36 9 °C)-98 9 °F (37 2 °C)] 98 6 °F (37 °C)  HR:  [80-88] 85  Resp:  [16-18] 18  BP: (116-137)/(56-64) 124/64  SpO2:  [95 %-97 %] 95 %  Body mass index is 25 2 kg/m²  Input and Output Summary (last 24 hours): Intake/Output Summary (Last 24 hours) at 2019 1436  Last data filed at 2019 1412  Gross per 24 hour   Intake 240 ml   Output 396 ml   Net -156 ml       Physical Exam:     Physical Exam   Constitutional: She appears well-nourished  HENT:   Head: Normocephalic and atraumatic  Right Ear: External ear normal    Left Ear: External ear normal    Mouth/Throat: Oropharynx is clear and moist    Musculoskeletal: She exhibits edema  She exhibits no tenderness or deformity  Neurological: She is alert  Psychiatric: She has a normal mood and affect   Her behavior is normal  Thought content normal        Additional Data:     Labs:    Results from last 7 days   Lab Units 19   WBC Thousand/uL 11 27*   HEMOGLOBIN g/dL 10 7*   HEMATOCRIT % 34 4*   PLATELETS Thousands/uL 443*   NEUTROS PCT % 69   LYMPHS PCT % 19   MONOS PCT % 7   EOS PCT % 4     Results from last 7 days   Lab Units 19  1144   POTASSIUM mmol/L 4 1   < > 3 7   CHLORIDE mmol/L 109*   < > 105   CO2 mmol/L 26   < > 27   BUN mg/dL 15   < > 17   CREATININE mg/dL 0 62   < > 0 60   CALCIUM mg/dL 9 0   < > 9 3   ALK PHOS U/L  --   --  120*   ALT U/L  --   --  31   AST U/L  --   --  22    < > = values in this interval not displayed  Results from last 7 days   Lab Units 11/03/19  1144   INR  1 10       * I Have Reviewed All Lab Data Listed Above  * Additional Pertinent Lab Tests Reviewed: [address] Admission Reviewed    Imaging:    Imaging Reports Reviewed Today Include:    Imaging Personally Reviewed by Myself Includes:      Recent Cultures (last 7 days):     Results from last 7 days   Lab Units 11/04/19  0959 11/03/19  1144   BLOOD CULTURE   --  No Growth After 5 Days  No Growth After 5 Days  GRAM STAIN RESULT  No polys seen*  3+ Gram negative rods*  2+ Gram positive cocci in pairs*  --    WOUND CULTURE  4+ Growth of Staphylococcus aureus*  3+ Growth of Diphtheroids  --        Last 24 Hours Medication List:     Current Facility-Administered Medications:  acetaminophen 650 mg Oral Q6H Kristyn Liu MD    artificial tear  Both Eyes HS Oneida Ganser, MD    calcium carbonate-vitamin D 1 tablet Oral Daily With Breakfast Oneida Ganser, MD    cefazolin 1,000 mg Intravenous Q8H Anne Aldea, DO Last Rate: 1,000 mg (11/09/19 1349)   cyanocobalamin 1,000 mcg Oral Daily Katherine Mcgraw MD    docusate sodium 100 mg Oral Daily Oneida Ganser, MD    heparin (porcine) 5,000 Units Subcutaneous Q8H [address] Oneida Ganser, MD    lisinopril 5 mg Oral Daily Oneida Ganser, MD    rivastigmine 4 5 mg Oral Daily Oneida Ganser, MD    sodium chloride (PF) 3 mL Intravenous PRN Oneida Ganser, MD         Today, Patient Was Seen By: Brooke Monzon MD    ** Please Note: Dictation voice to text software may have been used in the creation of this document   **

## 2019-11-09 NOTE — PROGRESS NOTES
Progress Note - Onel Lopez 1945, 66 y o  female MRN: [id number]    Unit/Bed#: -01 Encounter: [id number]    Primary Care Provider: Diane Shabazz MD   Date and time admitted to hospital: 11/3/2019 10:54 AM        Leukocytosis  Assessment & Plan  · Likely secondary to the above  Improving  · IV abx as above  · Blood cultures preliminarily negative x2 at 24 hours  · Blister culture as above  · CBC in AM    Essential hypertension  Assessment & Plan  · Blood pressure stable  · Continue lisinopril      Frontotemporal dementia without behavioral disturbance (HCC)  Assessment & Plan  · Continue rivastigmine  · Fall precautions  · Patient comes from locked dementia unit  · PT/OT consults pending for discharge planning     * Cellulitis of right leg  Assessment & Plan  · Patient presented to the ED on 11/3/19 with right leg cellulitis and right heel wound  · Extent of cellulitis was almost up to the knee and was marked to see progression - has improved based on admission markings  · Podiatry consult appreciated  De-escalate IV abx  Cefepime was discontinued prior  MRSA swab negative and wound culture with 3+ gram negative rods and 2+ gram positive cocci in pairs  Place on IV Ancef  · Right foot XR revealed dorsal skin ulceration and subcutaneous edema consistent with cellulitis with no acute osseous abnormality or radiographic evidence of osteomyelitis per the result report  · Completed 7 days total of antibiotics per ID recommendations  It will be last day today, will check CBC and BMp in the morning  She will not have any procedure will need outpatient follow up with podiatry         VTE Pharmacologic Prophylaxis:   Pharmacologic: Heparin  Mechanical VTE Prophylaxis in Place: Yes    Patient Centered Rounds: I have performed bedside rounds with nursing staff today      Discussions with Specialists or Other Care Team Provider: medicine    Education and Discussions with Family / Patient: patient     Time Spent for Care: 45 minutes  More than 50% of total time spent on counseling and coordination of care as described above  Current Length of Stay: 6 day(s)    Current Patient Status: Inpatient   Certification Statement: The patient will continue to require additional inpatient hospital stay due to cellulitis    Discharge Plan: being treated with antibiotics, podiatry to do debridement    Code Status: Level 1 - Full Code      Subjective:   Patient is pleasant today, no new complaints  Objective:     Vitals:   Temp (24hrs), Av 6 °F (37 °C), Min:98 4 °F (36 9 °C), Max:98 9 °F (37 2 °C)    Temp:  [98 4 °F (36 9 °C)-98 9 °F (37 2 °C)] 98 6 °F (37 °C)  HR:  [80-88] 85  Resp:  [16-18] 18  BP: (116-137)/(56-64) 124/64  SpO2:  [95 %-97 %] 95 %  Body mass index is 25 2 kg/m²  Input and Output Summary (last 24 hours): Intake/Output Summary (Last 24 hours) at 2019 1434  Last data filed at 2019 1412  Gross per 24 hour   Intake 240 ml   Output 396 ml   Net -156 ml       Physical Exam:     Physical Exam   Constitutional: She appears well-developed and well-nourished  HENT:   Head: Normocephalic and atraumatic  Right Ear: External ear normal    Left Ear: External ear normal    Nose: Nose normal    Mouth/Throat: Oropharynx is clear and moist  No oropharyngeal exudate  Eyes: Pupils are equal, round, and reactive to light  Conjunctivae and EOM are normal  Right eye exhibits no discharge  Left eye exhibits no discharge  No scleral icterus  Neck: Normal range of motion  Neck supple  No thyromegaly present  Cardiovascular: Normal rate, regular rhythm, normal heart sounds and intact distal pulses  Exam reveals no gallop and no friction rub  No murmur heard  Pulmonary/Chest: Effort normal and breath sounds normal  No stridor  No respiratory distress  She has no wheezes  She has no rales  She exhibits no tenderness  Abdominal: Soft  Bowel sounds are normal  She exhibits no distension and no mass  There is no tenderness  There is no rebound and no guarding  No hernia  Musculoskeletal: She exhibits edema and deformity  She exhibits no tenderness  Neurological: She is alert  She displays normal reflexes  No cranial nerve deficit or sensory deficit  She exhibits normal muscle tone  Coordination normal    Skin: Skin is warm and dry  No rash noted  No erythema  No pallor  Psychiatric: She has a normal mood and affect  Nursing note and vitals reviewed  Additional Data:     Labs:    Results from last 7 days   Lab Units 11/09/19  0518   WBC Thousand/uL 11 27*   HEMOGLOBIN g/dL 10 7*   HEMATOCRIT % 34 4*   PLATELETS Thousands/uL 443*   NEUTROS PCT % 69   LYMPHS PCT % 19   MONOS PCT % 7   EOS PCT % 4     Results from last 7 days   Lab Units 11/09/19  0518  11/03/19  1144   POTASSIUM mmol/L 4 1   < > 3 7   CHLORIDE mmol/L 109*   < > 105   CO2 mmol/L 26   < > 27   BUN mg/dL 15   < > 17   CREATININE mg/dL 0 62   < > 0 60   CALCIUM mg/dL 9 0   < > 9 3   ALK PHOS U/L  --   --  120*   ALT U/L  --   --  31   AST U/L  --   --  22    < > = values in this interval not displayed  Results from last 7 days   Lab Units 11/03/19  1144   INR  1 10       * I Have Reviewed All Lab Data Listed Above  * Additional Pertinent Lab Tests Reviewed: [address] Admission Reviewed    Imaging:    Imaging Reports Reviewed Today Include:    Imaging Personally Reviewed by Myself Includes:       Recent Cultures (last 7 days):     Results from last 7 days   Lab Units 11/04/19  0959 11/03/19  1144   BLOOD CULTURE   --  No Growth After 5 Days  No Growth After 5 Days     GRAM STAIN RESULT  No polys seen*  3+ Gram negative rods*  2+ Gram positive cocci in pairs*  --    WOUND CULTURE  4+ Growth of Staphylococcus aureus*  3+ Growth of Diphtheroids  --        Last 24 Hours Medication List:     Current Facility-Administered Medications:  acetaminophen 650 mg Oral Q6H Tomás Contreras MD    artificial tear  Both Eyes HS Anthony Rhoades MD    calcium carbonate-vitamin D 1 tablet Oral Daily With Breakfast Anthony Rhoades MD    cefazolin 1,000 mg Intravenous Q8H Anne Aldea, DO Last Rate: 1,000 mg (11/09/19 1349)   cyanocobalamin 1,000 mcg Oral Daily Anthony Rhoades MD    docusate sodium 100 mg Oral Daily Anthony Rhoades MD    heparin (porcine) 5,000 Units Subcutaneous Q8H Eliot Amaya MD    lisinopril 5 mg Oral Daily Anthony Rhoades MD    rivastigmine 4 5 mg Oral Daily Anthony Rhoades MD    sodium chloride (PF) 3 mL Intravenous PRN Anthony Rhoades MD         Today, Patient Was Seen By: Tony Davis MD    ** Please Note: Dictation voice to text software may have been used in the creation of this document   **

## 2019-11-09 NOTE — DISCHARGE INSTRUCTIONS
Discharge Instructions - Podiatry    Weight Bearing Status:     Weight bearing as tolerated                                        Pain: Continue analgesics as directed    Follow-up appointment instructions: Please make an appointment within one week of discharge with Dr Sebastian Gloria  Contact sooner if any increase in pain, or signs of infection occur    Wound Care: Please leave heel dressing intact until seen at follow up appointment in wound care center

## 2019-11-09 NOTE — ASSESSMENT & PLAN NOTE
· Patient presented to the ED on 11/3/19 with right leg cellulitis and right heel wound  · Extent of cellulitis was almost up to the knee and was marked to see progression - has improved based on admission markings  · Podiatry consult appreciated  De-escalate IV abx  Cefepime was discontinued prior  MRSA swab negative and wound culture with 3+ gram negative rods and 2+ gram positive cocci in pairs  Place on IV Ancef  · Right foot XR revealed dorsal skin ulceration and subcutaneous edema consistent with cellulitis with no acute osseous abnormality or radiographic evidence of osteomyelitis per the result report  · Completed 7 days total of antibiotics per ID recommendations  It will be last day today, will check CBC and BMp in the morning   She will not have any procedure will need outpatient follow up with podiatry

## 2019-11-09 NOTE — SOCIAL WORK
Pt reviewed in [facility] care rounds  Pt possible for d/c tomorrow pending consults and PO abx today  CM to follow

## 2019-11-10 VITALS
DIASTOLIC BLOOD PRESSURE: 73 MMHG | HEIGHT: 59 IN | WEIGHT: 124.78 LBS | OXYGEN SATURATION: 93 % | TEMPERATURE: 98.8 F | BODY MASS INDEX: 25.16 KG/M2 | SYSTOLIC BLOOD PRESSURE: 122 MMHG | RESPIRATION RATE: 16 BRPM | HEART RATE: 87 BPM

## 2019-11-10 PROCEDURE — 99239 HOSP IP/OBS DSCHRG MGMT >30: CPT | Performed by: INTERNAL MEDICINE

## 2019-11-10 RX ADMIN — ACETAMINOPHEN 650 MG: 325 TABLET ORAL at 12:44

## 2019-11-10 RX ADMIN — HEPARIN SODIUM 5000 UNITS: 5000 INJECTION INTRAVENOUS; SUBCUTANEOUS at 13:21

## 2019-11-10 RX ADMIN — ACETAMINOPHEN 650 MG: 325 TABLET ORAL at 05:12

## 2019-11-10 RX ADMIN — Medication 1 TABLET: at 08:44

## 2019-11-10 RX ADMIN — DOCUSATE SODIUM 100 MG: 100 CAPSULE, LIQUID FILLED ORAL at 08:44

## 2019-11-10 RX ADMIN — CYANOCOBALAMIN TAB 500 MCG 1000 MCG: 500 TAB at 08:44

## 2019-11-10 RX ADMIN — HEPARIN SODIUM 5000 UNITS: 5000 INJECTION INTRAVENOUS; SUBCUTANEOUS at 05:12

## 2019-11-10 RX ADMIN — RIVASTIGMINE TARTRATE 4.5 MG: 3 CAPSULE ORAL at 08:45

## 2019-11-10 RX ADMIN — LISINOPRIL 5 MG: 5 TABLET ORAL at 08:45

## 2019-11-10 NOTE — PLAN OF CARE
Problem: Prexisting or High Potential for Compromised Skin Integrity  Goal: Skin integrity is maintained or improved  Description  INTERVENTIONS:  - Identify patients at risk for skin breakdown  - Assess and monitor skin integrity  - Assess and monitor nutrition and hydration status  - Monitor labs   - Assess for incontinence   - Turn and reposition patient  - Assist with mobility/ambulation  - Relieve pressure over bony prominences  - Avoid friction and shearing  - Provide appropriate hygiene as needed including keeping skin clean and dry  - Evaluate need for skin moisturizer/barrier cream  - Collaborate with interdisciplinary team   - Patient/family teaching  - Consider wound care consult   Outcome: Adequate for Discharge     Problem: Potential for Falls  Goal: Patient will remain free of falls  Description  INTERVENTIONS:  - Assess patient frequently for physical needs  -  Identify cognitive and physical deficits and behaviors that affect risk of falls    -  [place] fall precautions as indicated by assessment   - Educate patient/family on patient safety including physical limitations  - Instruct patient to call for assistance with activity based on assessment  - Modify environment to reduce risk of injury  - Consider OT/PT consult to assist with strengthening/mobility  Outcome: Adequate for Discharge     Problem: SKIN/TISSUE INTEGRITY - ADULT  Goal: Skin integrity remains intact  Description  INTERVENTIONS  - Identify patients at risk for skin breakdown  - Assess and monitor skin integrity  - Assess and monitor nutrition and hydration status  - Monitor labs (i e  albumin)  - Assess for incontinence   - Turn and reposition patient  - Assist with mobility/ambulation  - Relieve pressure over bony prominences  - Avoid friction and shearing  - Provide appropriate hygiene as needed including keeping skin clean and dry  - Evaluate need for skin moisturizer/barrier cream  - Collaborate with interdisciplinary team (i e  Nutrition, Rehabilitation, etc )   - Patient/family teaching  Outcome: Adequate for Discharge  Goal: Incision(s), wounds(s) or drain site(s) healing without S/S of infection  Description  INTERVENTIONS  - Assess and document risk factors for skin impairment   - Assess and document dressing, incision, wound bed, drain sites and surrounding tissue  - Consider nutrition services referral as needed  - Oral mucous membranes remain intact  - Provide patient/ family education  Outcome: Adequate for Discharge  Goal: Oral mucous membranes remain intact  Description  INTERVENTIONS  - Assess oral mucosa and hygiene practices  - Implement preventative oral hygiene regimen  - Implement oral medicated treatments as ordered  - Initiate Nutrition services referral as needed  Outcome: Adequate for Discharge     Problem: PAIN - ADULT  Goal: Verbalizes/displays adequate comfort level or baseline comfort level  Description  Interventions:  - Encourage patient to monitor pain and request assistance  - Assess pain using appropriate pain scale  - Administer analgesics based on type and severity of pain and evaluate response  - Implement non-pharmacological measures as appropriate and evaluate response  - Consider cultural and social influences on pain and pain management  - Notify physician/advanced practitioner if interventions unsuccessful or patient reports new pain  Outcome: Adequate for Discharge     Problem: INFECTION - ADULT  Goal: Absence or prevention of progression during hospitalization  Description  INTERVENTIONS:  - Assess and monitor for signs and symptoms of infection  - Monitor lab/diagnostic results  - Monitor all insertion sites, i e  indwelling lines, tubes, and drains  - Monitor endotracheal if appropriate and nasal secretions for changes in amount and color  - [place] appropriate cooling/warming therapies per order  - Administer medications as ordered  - Instruct and encourage patient and family to use good hand hygiene technique  - Identify and instruct in appropriate isolation precautions for identified infection/condition  Outcome: Adequate for Discharge  Goal: Absence of fever/infection during neutropenic period  Description  INTERVENTIONS:  - Monitor WBC    Outcome: Adequate for Discharge     Problem: SAFETY ADULT  Goal: Maintain or return to baseline ADL function  Description  INTERVENTIONS:  -  Assess patient's ability to carry out ADLs; assess patient's baseline for ADL function and identify physical deficits which impact ability to perform ADLs (bathing, care of mouth/teeth, toileting, grooming, dressing, etc )  - Assess/evaluate cause of self-care deficits   - Assess range of motion  - Assess patient's mobility; develop plan if impaired  - Assess patient's need for assistive devices and provide as appropriate  - Encourage maximum independence but intervene and supervise when necessary  - Involve family in performance of ADLs  - Assess for home care needs following discharge   - Consider OT consult to assist with ADL evaluation and planning for discharge  - Provide patient education as appropriate  Outcome: Adequate for Discharge  Goal: Maintain or return mobility status to optimal level  Description  INTERVENTIONS:  - Assess patient's baseline mobility status (ambulation, transfers, stairs, etc )    - Identify cognitive and physical deficits and behaviors that affect mobility  - Identify mobility aids required to assist with transfers and/or ambulation (gait belt, sit-to-stand, lift, walker, cane, etc )  - [place] fall precautions as indicated by assessment  - Record patient progress and toleration of activity level on Mobility SBAR; progress patient to next Phase/Stage  - Instruct patient to call for assistance with activity based on assessment  - Consider rehabilitation consult to assist with strengthening/weightbearing, etc   Outcome: Adequate for Discharge

## 2019-11-10 NOTE — PLAN OF CARE
Problem: Prexisting or High Potential for Compromised Skin Integrity  Goal: Skin integrity is maintained or improved  Description  INTERVENTIONS:  - Identify patients at risk for skin breakdown  - Assess and monitor skin integrity  - Assess and monitor nutrition and hydration status  - Monitor labs   - Assess for incontinence   - Turn and reposition patient  - Assist with mobility/ambulation  - Relieve pressure over bony prominences  - Avoid friction and shearing  - Provide appropriate hygiene as needed including keeping skin clean and dry  - Evaluate need for skin moisturizer/barrier cream  - Collaborate with interdisciplinary team   - Patient/family teaching  - Consider wound care consult   Outcome: Progressing     Problem: Potential for Falls  Goal: Patient will remain free of falls  Description  INTERVENTIONS:  - Assess patient frequently for physical needs  -  Identify cognitive and physical deficits and behaviors that affect risk of falls    -  [place] fall precautions as indicated by assessment   - Educate patient/family on patient safety including physical limitations  - Instruct patient to call for assistance with activity based on assessment  - Modify environment to reduce risk of injury  - Consider OT/PT consult to assist with strengthening/mobility  Outcome: Progressing     Problem: SKIN/TISSUE INTEGRITY - ADULT  Goal: Skin integrity remains intact  Description  INTERVENTIONS  - Identify patients at risk for skin breakdown  - Assess and monitor skin integrity  - Assess and monitor nutrition and hydration status  - Monitor labs (i e  albumin)  - Assess for incontinence   - Turn and reposition patient  - Assist with mobility/ambulation  - Relieve pressure over bony prominences  - Avoid friction and shearing  - Provide appropriate hygiene as needed including keeping skin clean and dry  - Evaluate need for skin moisturizer/barrier cream  - Collaborate with interdisciplinary team (i e  Nutrition, Rehabilitation, etc )   - Patient/family teaching  Outcome: Progressing  Goal: Incision(s), wounds(s) or drain site(s) healing without S/S of infection  Description  INTERVENTIONS  - Assess and document risk factors for skin impairment   - Assess and document dressing, incision, wound bed, drain sites and surrounding tissue  - Consider nutrition services referral as needed  - Oral mucous membranes remain intact  - Provide patient/ family education  Outcome: Progressing  Goal: Oral mucous membranes remain intact  Description  INTERVENTIONS  - Assess oral mucosa and hygiene practices  - Implement preventative oral hygiene regimen  - Implement oral medicated treatments as ordered  - Initiate Nutrition services referral as needed  Outcome: Progressing     Problem: PAIN - ADULT  Goal: Verbalizes/displays adequate comfort level or baseline comfort level  Description  Interventions:  - Encourage patient to monitor pain and request assistance  - Assess pain using appropriate pain scale  - Administer analgesics based on type and severity of pain and evaluate response  - Implement non-pharmacological measures as appropriate and evaluate response  - Consider cultural and social influences on pain and pain management  - Notify physician/advanced practitioner if interventions unsuccessful or patient reports new pain  Outcome: Progressing     Problem: INFECTION - ADULT  Goal: Absence or prevention of progression during hospitalization  Description  INTERVENTIONS:  - Assess and monitor for signs and symptoms of infection  - Monitor lab/diagnostic results  - Monitor all insertion sites, i e  indwelling lines, tubes, and drains  - Monitor endotracheal if appropriate and nasal secretions for changes in amount and color  - [place] appropriate cooling/warming therapies per order  - Administer medications as ordered  - Instruct and encourage patient and family to use good hand hygiene technique  - Identify and instruct in appropriate isolation precautions for identified infection/condition  Outcome: Progressing  Goal: Absence of fever/infection during neutropenic period  Description  INTERVENTIONS:  - Monitor WBC    Outcome: Progressing     Problem: SAFETY ADULT  Goal: Maintain or return to baseline ADL function  Description  INTERVENTIONS:  -  Assess patient's ability to carry out ADLs; assess patient's baseline for ADL function and identify physical deficits which impact ability to perform ADLs (bathing, care of mouth/teeth, toileting, grooming, dressing, etc )  - Assess/evaluate cause of self-care deficits   - Assess range of motion  - Assess patient's mobility; develop plan if impaired  - Assess patient's need for assistive devices and provide as appropriate  - Encourage maximum independence but intervene and supervise when necessary  - Involve family in performance of ADLs  - Assess for home care needs following discharge   - Consider OT consult to assist with ADL evaluation and planning for discharge  - Provide patient education as appropriate  Outcome: Progressing  Goal: Maintain or return mobility status to optimal level  Description  INTERVENTIONS:  - Assess patient's baseline mobility status (ambulation, transfers, stairs, etc )    - Identify cognitive and physical deficits and behaviors that affect mobility  - Identify mobility aids required to assist with transfers and/or ambulation (gait belt, sit-to-stand, lift, walker, cane, etc )  - [place] fall precautions as indicated by assessment  - Record patient progress and toleration of activity level on Mobility SBAR; progress patient to next Phase/Stage  - Instruct patient to call for assistance with activity based on assessment  - Consider rehabilitation consult to assist with strengthening/weightbearing, etc   Outcome: Progressing

## 2019-11-10 NOTE — SOCIAL WORK
TC from Dr Diana Terrell stating pt can go back to [facility] today  Called [facility] & s/w Rik Connell whom stated they can accept pt back  S/w pt;s dtr Kym Capps whom is in agreement with this  IMM reviewed  Aware set up with wheelchair Darius Early for 330 today via [facility] transport  Kym Capps agreeable to fees  She want to s/w [facility] to discuss dc  Provided [facility] with ph #     Rik Connell at [facility], [address] [place] [address] St & pt's dtr Mallory  aware of dc plan  Addendum 1515: S/w Dr Diana Terrell whom s/w dtr & is requesting CM email dc instructions to dtr at [email or web address]  Emailed to her

## 2019-11-14 NOTE — DISCHARGE SUMMARY
Discharge Summary - Josh Mcpherson 66 y o  female MRN: [id number]    Unit/Bed#: -01 Encounter: [id number]    Admission Date:   Admission Orders (From admission, onward)     Ordered        11/03/19 1303  Inpatient Admission  Once                     Admitting Diagnosis: Foot pain [M79 673]  Diabetic foot ulcer ([address] ) [N56 337, L97 509]  Cellulitis of right leg [L03 115]  Cellulitis of right anterior lower leg [L03 115]    HPI:  Josh Mcpherson is a 66 y o  female who presents with right leg cellulitis  Patient is a 18-year-old female with a past medical history of hypertension, impaired fasting glucose, frontotemporal dementia, hypercholesterolemia, arthritis  She presented from [facility] for right leg cellulitis, patient due to dementia is a very poor historian  But she could tell me that she has pain in the right leg  Otherwise she denies any issues  Patient was recently admitted on 10/18/2019 after a fall diagnosed with acute pancreatitis  Discharge back to a locked dementia unit on 10/20/2019  I tried to reach out to the facility for further informations, nobody is picking up the phone  ED course:  She was found to have a white blood cell count of 16, lactic acid 1 6, afebrile, did not meet sepsis criteria  Admit for cellulitis and need for IV antibiotics    Procedures Performed: No orders of the defined types were placed in this encounter  Summary of Hospital Course: Cellulitis of the right leg, presented to ED on 11/3/2019 with right leg cellulitis and right heel wound  Extent of cellulitis was almost up to the knee and marked for monitoring progression  It has improved based on the initial markings  Podiatry consulted, iv antibiotics were descelated  Cefepime was discontinued  MRSA swab negative and would cultures with 3+ gram negative rods and 2+ gram positive cocci in pairs  Placed on ancef iv   Right foot XR revealed dorsal skin ulceration and subcutaneous edema consistent with cellulitis with no acuate osseous abnormality or radiographic evidence of osteomyelitis per the results report  Completed 7 days of total antibiotics per ID recommendations  Patient has completed treatment  CBC and BMP monitored closely for renal function changes and adjustment of antibiotics, no changes have to be make  She will needs to follow podiatry outpatient for continued care and follow up  I have discussed this with shaista on the phone and son came to visit  Patient has history of frontotemporal dementia, supportive care was provided  She was rivastigmine  Fall precautions were followed  PT/OT consulted with changes  Followed closely for essential hypertension and leukocytosis for improvement  Significant Findings, Care, Treatment and Services Provided: as above    Complications: none    Discharge Diagnosis: cellulitis of right lower extremity    Resolved Problems  Date Reviewed: 11/9/2019    None          Condition at Discharge: stable         Discharge instructions/Information to patient and family:   See after visit summary for information provided to patient and family  Provisions for Follow-Up Care:  See after visit summary for information related to follow-up care and any pertinent home health orders  PCP: Skyler Beck MD    Disposition: Skilled nursing facility at [facility]    Planned Readmission: No      Discharge Statement   I spent 45 minutes discharging the patient  This time was spent on the day of discharge  I had direct contact with the patient on the day of discharge  Additional documentation is required if more than 30 minutes were spent on discharge  Discharge Medications:  See after visit summary for reconciled discharge medications provided to patient and family

## 2020-09-18 ENCOUNTER — HOSPITAL ENCOUNTER (EMERGENCY)
Facility: HOSPITAL | Age: 75
Discharge: HOME | End: 2020-09-19
Attending: EMERGENCY MEDICINE | Admitting: EMERGENCY MEDICINE
Payer: COMMERCIAL

## 2020-09-18 ENCOUNTER — APPOINTMENT (EMERGENCY)
Dept: CT IMAGING | Facility: HOSPITAL | Age: 75
End: 2020-09-18
Payer: COMMERCIAL
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
## 2020-09-18 LAB
ANION GAP SERPL CALCULATED.3IONS-SCNC: 10 MMOL/L (ref 4–13)
BACTERIA UR QL AUTO: ABNORMAL /HPF
BASOPHILS # BLD AUTO: 0.05 THOUSANDS/ΜL (ref 0–0.1)
BASOPHILS NFR BLD AUTO: 0 % (ref 0–1)
BILIRUB UR QL STRIP: NEGATIVE
BUN SERPL-MCNC: 14 MG/DL (ref 5–25)
CALCIUM SERPL-MCNC: 9.2 MG/DL (ref 8.3–10.1)
CAOX CRY URNS QL MICRO: ABNORMAL /HPF
CHLORIDE SERPL-SCNC: 102 MMOL/L (ref 100–108)
CLARITY UR: CLEAR
CO2 SERPL-SCNC: 24 MMOL/L (ref 21–32)
COLOR UR: YELLOW
CREAT SERPL-MCNC: 0.69 MG/DL (ref 0.6–1.3)
EOSINOPHIL # BLD AUTO: 0.01 THOUSAND/ΜL (ref 0–0.61)
EOSINOPHIL NFR BLD AUTO: 0 % (ref 0–6)
ERYTHROCYTE [DISTWIDTH] IN BLOOD BY AUTOMATED COUNT: 13.5 % (ref 11.6–15.1)
GFR SERPL CREATININE-BSD FRML MDRD: 84 ML/MIN/1.73SQ M
GLUCOSE SERPL-MCNC: 144 MG/DL (ref 65–140)
GLUCOSE UR STRIP-MCNC: NEGATIVE MG/DL
HCT VFR BLD AUTO: 41.2 % (ref 34.8–46.1)
HGB BLD-MCNC: 13.1 G/DL (ref 11.5–15.4)
HGB UR QL STRIP.AUTO: ABNORMAL
IMM GRANULOCYTES # BLD AUTO: 0.12 THOUSAND/UL (ref 0–0.2)
IMM GRANULOCYTES NFR BLD AUTO: 1 % (ref 0–2)
KETONES UR STRIP-MCNC: NEGATIVE MG/DL
LEUKOCYTE ESTERASE UR QL STRIP: NEGATIVE
LYMPHOCYTES # BLD AUTO: 0.91 THOUSANDS/ΜL (ref 0.6–4.47)
LYMPHOCYTES NFR BLD AUTO: 6 % (ref 14–44)
MCH RBC QN AUTO: 27.8 PG (ref 26.8–34.3)
MCHC RBC AUTO-ENTMCNC: 31.8 G/DL (ref 31.4–37.4)
MCV RBC AUTO: 87 FL (ref 82–98)
MONOCYTES # BLD AUTO: 0.8 THOUSAND/ΜL (ref 0.17–1.22)
MONOCYTES NFR BLD AUTO: 5 % (ref 4–12)
NEUTROPHILS # BLD AUTO: 14.04 THOUSANDS/ΜL (ref 1.85–7.62)
NEUTS SEG NFR BLD AUTO: 88 % (ref 43–75)
NITRITE UR QL STRIP: NEGATIVE
NON-SQ EPI CELLS URNS QL MICRO: ABNORMAL /HPF
NRBC BLD AUTO-RTO: 0 /100 WBCS
PH UR STRIP.AUTO: 6.5 [PH] (ref 4.5–8)
PLATELET # BLD AUTO: 431 THOUSANDS/UL (ref 149–390)
PMV BLD AUTO: 9.3 FL (ref 8.9–12.7)
POTASSIUM SERPL-SCNC: 4.2 MMOL/L (ref 3.5–5.3)
PROT UR STRIP-MCNC: NEGATIVE MG/DL
RBC # BLD AUTO: 4.72 MILLION/UL (ref 3.81–5.12)
RBC #/AREA URNS AUTO: ABNORMAL /HPF
SODIUM SERPL-SCNC: 136 MMOL/L (ref 136–145)
SP GR UR STRIP.AUTO: 1.02 (ref 1–1.03)
UROBILINOGEN UR QL STRIP.AUTO: 0.2 E.U./DL
WBC # BLD AUTO: 15.93 THOUSAND/UL (ref 4.31–10.16)
WBC #/AREA URNS AUTO: ABNORMAL /HPF

## 2020-09-18 PROCEDURE — 70450 CT HEAD/BRAIN W/O DYE: CPT

## 2020-09-18 PROCEDURE — 80048 BASIC METABOLIC PNL TOTAL CA: CPT | Performed by: EMERGENCY MEDICINE

## 2020-09-18 PROCEDURE — 71260 CT THORAX DX C+: CPT

## 2020-09-18 PROCEDURE — 74177 CT ABD & PELVIS W/CONTRAST: CPT

## 2020-09-18 PROCEDURE — 99285 EMERGENCY DEPT VISIT HI MDM: CPT

## 2020-09-18 PROCEDURE — 36415 COLL VENOUS BLD VENIPUNCTURE: CPT | Performed by: EMERGENCY MEDICINE

## 2020-09-18 PROCEDURE — 72125 CT NECK SPINE W/O DYE: CPT

## 2020-09-18 PROCEDURE — 85025 COMPLETE CBC W/AUTO DIFF WBC: CPT | Performed by: EMERGENCY MEDICINE

## 2020-09-18 PROCEDURE — 81001 URINALYSIS AUTO W/SCOPE: CPT

## 2020-09-18 PROCEDURE — 99284 EMERGENCY DEPT VISIT MOD MDM: CPT | Performed by: EMERGENCY MEDICINE

## 2020-09-18 PROCEDURE — G1004 CDSM NDSC: HCPCS

## 2020-09-18 RX ORDER — FENTANYL CITRATE 50 UG/ML
25 INJECTION, SOLUTION INTRAMUSCULAR; INTRAVENOUS ONCE AS NEEDED
Status: DISCONTINUED | OUTPATIENT
Start: 2020-09-18 | End: 2020-09-19 | Stop reason: HOSPADM

## 2020-09-18 RX ORDER — FENTANYL CITRATE 50 UG/ML
50 INJECTION, SOLUTION INTRAMUSCULAR; INTRAVENOUS ONCE
Status: DISCONTINUED | OUTPATIENT
Start: 2020-09-18 | End: 2020-09-18

## 2020-09-18 RX ORDER — ACETAMINOPHEN 325 MG/1
975 TABLET ORAL ONCE
Status: COMPLETED | OUTPATIENT
Start: 2020-09-18 | End: 2020-09-18

## 2020-09-18 RX ADMIN — IOHEXOL 100 ML: 350 INJECTION, SOLUTION INTRAVENOUS at 19:55

## 2020-09-18 RX ADMIN — ACETAMINOPHEN 975 MG: 325 TABLET ORAL at 18:56

## 2020-09-18 NOTE — ED ATTENDING ATTESTATION
9/18/2020  IBrennan DO, saw and evaluated the patient  I have discussed the patient with the resident/non-physician practitioner and agree with the resident's/non-physician practitioner's findings, Plan of Care, and MDM as documented in the resident's/non-physician practitioner's note, except where noted  All available labs and Radiology studies were reviewed  I was present for key portions of any procedure(s) performed by the resident/non-physician practitioner and I was immediately available to provide assistance  At this point I agree with the current assessment done in the Emergency Department  I have conducted an independent evaluation of this patient a history and physical is as follows:    ED Course         Critical Care Time  Procedures    61-year-old female from a nursing home with a history of aphasia, dementia, hypertension presents to the emergency department for evaluation of possible fall  Patient has aphasia and severe dementia and is unable to provide any history at this time  EMS reports nursing home staff state that they found the patient on the floor  Unknown loss of consciousness  On exam, the patient is awake and alert and appears to be in no distress  No sign of external trauma to the head or face  Pupils are equal and reactive to light  No C-spine tenderness on exam   Heart is regular without murmur  Lungs are clear  No abrasions or contusions noted to the chest wall but she does seem to have diffuse tenderness  She also has tenderness in the right lower quadrant right hip region  No deformity noted of the right hip or leg shortening  She is able to follow commands and does not seem to have any focal motor deficits  Skin is otherwise warm and dry without rash  Given her inability to provide any history and tenderness on chest and abdominal exam   Will CT head, C-spine, chest abdomen pelvis which will also include right hip

## 2020-09-19 VITALS
OXYGEN SATURATION: 100 % | TEMPERATURE: 98.1 F | DIASTOLIC BLOOD PRESSURE: 85 MMHG | RESPIRATION RATE: 18 BRPM | HEART RATE: 65 BPM | SYSTOLIC BLOOD PRESSURE: 134 MMHG

## 2020-09-20 NOTE — ED PROVIDER NOTES
Final Diagnosis:  1  Fall, initial encounter        Chief Complaint   Patient presents with    Fall     per ems, pt had unwitnessed fall tonight  pt has hx of aphasia and was reported by staff to be at baseline  sent in for eval  pt at this time has c-collar in place and is grabbing at R hip  -thinners unknown LOC unknown head strike       HPI  52-year-old woman with dementia expressive aphasia    Patient lives at a skilled nursing facility dementia unit where she had an unwitnessed fall  She does not take any blood thinners  I cannot appreciate external signs of trauma  She is unable to tell me where she has pain  When I enter the room however she is grabbing in her right lower quadrant pannus and right hip  On my secondary trauma evaluation she also has chest wall pain on the right  Possible left occipital swelling but no skin breakdown    Airway intact, speaking without voice change without audible stridor  Bilateral breath sounds, full lung fields  Central (fem) and distal (dp/pt) pulses palpable  Disability, GCS 14 baseline and not following commands, again baseline moving all 4 spontaneously in with purpose  Patient exposed without additional injury    Vitals:    09/18/20 2358   BP: 134/85   Pulse: 65   Resp: 18   Temp:    SpO2: 100%             - No language barrier    - History obtained from chart as well as EMS  - There are dementia related limitations to the history obtained  - Previous charting underwent limited review with attention to labs, ekgs, and prior imaging  PMH:   has a past medical history of Dementia ([facility] [address] ), Diabetes mellitus ([facility] [address] ), Expressive aphasia, Hyperlipidemia, Hypertension, Impaired fasting glucose, Osteoarthritis, and Vitamin D deficiency  PSH:   has a past surgical history that includes Joint replacement       ROS:  Review of Systems   Unable to perform ROS: Dementia        PE:   Vitals:    09/18/20 1803 09/18/20 2029 09/18/20 2201 09/18/20 2358   BP: (!) 209/92 144/69 147/89 134/85   BP Location:  Right arm  Right arm   Pulse: 98 89 79 65   Resp: 18 18 18 18   Temp: 98 1 °F (36 7 °C)      TempSrc: Oral      SpO2: 100% 98% 100% 100%     Vitals reviewed by me  Physical Exam  Vitals signs and nursing note reviewed  Constitutional:       General: She is not in acute distress  Appearance: Normal appearance  She is well-developed and normal weight  She is not toxic-appearing or diaphoretic  Comments: Patient answers only yes no and does so inconsistently   HENT:      Head: Normocephalic  Right Ear: External ear normal       Left Ear: External ear normal       Nose: Nose normal       Mouth/Throat:      Mouth: Mucous membranes are moist    Eyes:      General: No scleral icterus  Right eye: No discharge  Left eye: No discharge  Extraocular Movements: Extraocular movements intact  Conjunctiva/sclera: Conjunctivae normal       Pupils: Pupils are equal, round, and reactive to light  Neck:      Musculoskeletal: No neck rigidity  Comments: Arrives in C-collar  Cardiovascular:      Rate and Rhythm: Normal rate and regular rhythm  Pulses: Normal pulses  Pulmonary:      Effort: Pulmonary effort is normal  No respiratory distress  Breath sounds: No stridor  Chest:      Chest wall: Tenderness (Right diffuse) present  Abdominal:      General: There is no distension  Palpations: Abdomen is soft  There is no mass  Tenderness: There is abdominal tenderness (Right lower quadrant)  There is no right CVA tenderness, left CVA tenderness, guarding or rebound  Musculoskeletal: Normal range of motion  General: Tenderness (Right hip on palpation however no pain when ranging passively) present  No deformity or signs of injury  Lymphadenopathy:      Cervical: No cervical adenopathy  Skin:     General: Skin is warm and dry  Capillary Refill: Capillary refill takes less than 2 seconds        Coloration: Skin is not jaundiced or pale  Findings: No rash  Neurological:      General: No focal deficit present  Mental Status: She is alert  Mental status is at baseline  Cranial Nerves: No cranial nerve deficit  Sensory: No sensory deficit  Coordination: Coordination normal           A:  - Nursing note reviewed  Given patient's nonverbal status and diffuse right-sided tenderness will CT chest abdomen pelvis    Given fall unknown head strike LOC will CT head C-spine for clearance     patient has leukocytosis will check urine studies this time    Unremarkable                 CT head without contrast   Final Result      No acute intracranial abnormality  Stable prominence of the ventricles which is disproportionate for the degree of sulcal atrophy but similar to the prior examinations  Findings could represent normal pressure hydrocephalus  Workstation performed: [id number]         CT spine cervical without contrast   Final Result      No cervical spine fracture or traumatic malalignment  Workstation performed: [id number]         CT chest abdomen pelvis w contrast   Final Result      No evidence of acute traumatic injury to the chest, abdomen, and pelvis  New moderate left and trace right water density pleural effusions                Workstation performed: [id number]           Orders Placed This Encounter   Procedures    CT head without contrast    CT spine cervical without contrast    CT chest abdomen pelvis w contrast    Basic metabolic panel    CBC and differential    Urine Microscopic    POCT urinalysis dipstick     Labs Reviewed   BASIC METABOLIC PANEL - Abnormal       Result Value Ref Range Status    Sodium 136  136 - 145 mmol/L Final    Potassium 4 2  3 5 - 5 3 mmol/L Final    Chloride 102  100 - 108 mmol/L Final    CO2 24  21 - 32 mmol/L Final    ANION GAP 10  4 - 13 mmol/L Final    BUN 14  5 - 25 mg/dL Final    Creatinine 0 69  0 60 - 1 30 mg/dL Final    Comment: Standardized to IDMS reference method    Glucose 144 (*) 65 - 140 mg/dL Final    Comment: If the patient is fasting, the ADA then defines impaired fasting glucose as > 100 mg/dL and diabetes as > or equal to 123 mg/dL  Specimen collection should occur prior to Sulfasalazine administration due to the potential for falsely depressed results  Specimen collection should occur prior to Sulfapyridine administration due to the potential for falsely elevated results      Calcium 9 2  8 3 - 10 1 mg/dL Final    eGFR 84  ml/min/1 73sq m Final    Narrative:     [place] guidelines for Chronic Kidney Disease (CKD):     Stage 1 with normal or high GFR (GFR > 90 mL/min/1 73 square meters)    Stage 2 Mild CKD (GFR = 60-89 mL/min/1 73 square meters)    Stage 3A Moderate CKD (GFR = 45-59 mL/min/1 73 square meters)    Stage 3B Moderate CKD (GFR = 30-44 mL/min/1 73 square meters)    Stage 4 Severe CKD (GFR = 15-29 mL/min/1 73 square meters)    Stage 5 End Stage CKD (GFR <15 mL/min/1 73 square meters)  Note: GFR calculation is accurate only with a steady state creatinine   CBC AND DIFFERENTIAL - Abnormal    WBC 15 93 (*) 4 31 - 10 16 Thousand/uL Final    RBC 4 72  3 81 - 5 12 Million/uL Final    Hemoglobin 13 1  11 5 - 15 4 g/dL Final    Hematocrit 41 2  34 8 - 46 1 % Final    MCV 87  82 - 98 fL Final    MCH 27 8  26 8 - 34 3 pg Final    MCHC 31 8  31 4 - 37 4 g/dL Final    RDW 13 5  11 6 - 15 1 % Final    MPV 9 3  8 9 - 12 7 fL Final    Platelets 728 (*) 563 - 390 Thousands/uL Final    nRBC 0  /100 WBCs Final    Neutrophils Relative 88 (*) 43 - 75 % Final    Immat GRANS % 1  0 - 2 % Final    Lymphocytes Relative 6 (*) 14 - 44 % Final    Monocytes Relative 5  4 - 12 % Final    Eosinophils Relative 0  0 - 6 % Final    Basophils Relative 0  0 - 1 % Final    Neutrophils Absolute 14 04 (*) 1 85 - 7 62 Thousands/µL Final    Immature Grans Absolute 0 12  0 00 - 0 20 Thousand/uL Final Lymphocytes Absolute 0 91  0 60 - 4 47 Thousands/µL Final    Monocytes Absolute 0 80  0 17 - 1 22 Thousand/µL Final    Eosinophils Absolute 0 01  0 00 - 0 61 Thousand/µL Final    Basophils Absolute 0 05  0 00 - 0 10 Thousands/µL Final   URINE MICROSCOPIC - Abnormal    RBC, UA 1-2 (*) None Seen, 0-5 /hpf Final    WBC, UA 1-2 (*) None Seen, 0-5, 5-55, 5-65 /hpf Final    Epithelial Cells None Seen  None Seen, Occasional /hpf Final    Bacteria, UA Occasional  None Seen, Occasional /hpf Final    Ca Oxalate Mary, UA Occasional (*) None Seen /hpf Final   POCT URINALYSIS DIPSTICK - Abnormal   URINE MACROSCOPIC, POC - Abnormal    Color, UA Yellow   Final    Clarity, UA Clear   Final    pH, UA 6 5  4 5 - 8 0 Final    Leukocytes, UA Negative  Negative Final    Nitrite, UA Negative  Negative Final    Protein, UA Negative  Negative mg/dl Final    Glucose, UA Negative  Negative mg/dl Final    Ketones, UA Negative  Negative mg/dl Final    Urobilinogen, UA 0 2  0 2, 1 0 E U /dl E U /dl Final    Bilirubin, UA Negative  Negative Final    Blood, UA Trace (*) Negative Final    Specific Gravity, UA 1 020  1 003 - 1 030 Final    Narrative:     CLINITEK RESULT       Final Diagnosis:  1  Fall, initial encounter        P:  - patient to be treated at home with Tylenol  - patient to follow with PCP       Medications   acetaminophen (TYLENOL) tablet 975 mg (975 mg Oral Given 9/18/20 1856)   iohexol (OMNIPAQUE) 350 MG/ML injection (SINGLE-DOSE) 100 mL (100 mL Intravenous Given 9/18/20 1955)     Time reflects when diagnosis was documented in both MDM as applicable and the Disposition within this note     Time User Action Codes Description Comment    9/18/2020  9:15 PM Nhan Sams, initial encounter       ED Disposition     ED Disposition Condition Date/Time Comment    Discharge Stable Fri Sep 18, 2020  9:15 PM Ajit Tesfaye discharge to home/self care              Follow-up Information     Follow up With Specialties Details Why [id number]  Errol Jo MD Family Medicine Call   [address]  [address] [place] [id number] [phone]          Discharge Medication List as of 9/18/2020  9:16 PM      CONTINUE these medications which have NOT CHANGED    Details   acetaminophen (TYLENOL) 325 mg tablet Take 2 tablets (650 mg total) by mouth every 6 (six) hours as needed for mild pain, moderate pain, headaches or fever, Starting Fri 5/3/2019, Print      artificial tear (LUBRIFRESH P M ) 83-15 % ophthalmic ointment Administer to both eyes daily at bedtime, Starting Fri 5/3/2019, Print      calcium carbonate-vitamin D (OSCAL-D) 500 mg-200 units per tablet Take 1 tablet by mouth daily with breakfast, Starting Sat 5/4/2019, Print      cyanocobalamin 1000 MCG tablet Take 1 tablet (1,000 mcg total) by mouth daily, Starting Fri 5/3/2019, Print      docusate sodium (COLACE) 100 mg capsule Take 1 capsule (100 mg total) by mouth daily, Starting Fri 5/3/2019, Print      !! Nutritional Supplements (ENSURE ACTIVE HIGH PROTEIN) LIQD Take 8 oz by mouth 2 (two) times a day, Starting Fri 5/3/2019, Print      rivastigmine (EXELON) 4 5 MG capsule Take 1 capsule (4 5 mg total) by mouth daily, Starting Fri 5/3/2019, Print      lisinopril (ZESTRIL) 5 mg tablet Take 1 tablet (5 mg total) by mouth daily, Starting Fri 5/3/2019, Until Sat 5/2/2020, Print      !! Nutritional Supplements (ENSURE ACTIVE HIGH PROTEIN) LIQD Take 8 oz by mouth 2 (two) times a day, Starting Wed 7/24/2019, No Print       !! - Potential duplicate medications found  Please discuss with provider  No discharge procedures on file  Prior to Admission Medications   Prescriptions Last Dose Informant Patient Reported? Taking?    Nutritional Supplements (ENSURE ACTIVE HIGH PROTEIN) LIQD   No Yes   Sig: Take 8 oz by mouth 2 (two) times a day   Patient taking differently: Take 8 oz by mouth daily    Nutritional Supplements (ENSURE ACTIVE HIGH PROTEIN) LIQD   No No Sig: Take 8 oz by mouth 2 (two) times a day   Patient not taking: Reported on 10/18/2019   acetaminophen (TYLENOL) 325 mg tablet   No Yes   Sig: Take 2 tablets (650 mg total) by mouth every 6 (six) hours as needed for mild pain, moderate pain, headaches or fever   artificial tear (LUBRIFRESH P M ) 83-15 % ophthalmic ointment   No Yes   Sig: Administer to both eyes daily at bedtime   calcium carbonate-vitamin D (OSCAL-D) 500 mg-200 units per tablet   No Yes   Sig: Take 1 tablet by mouth daily with breakfast   cyanocobalamin 1000 MCG tablet   No Yes   Sig: Take 1 tablet (1,000 mcg total) by mouth daily   docusate sodium (COLACE) 100 mg capsule   No Yes   Sig: Take 1 capsule (100 mg total) by mouth daily   lisinopril (ZESTRIL) 5 mg tablet   No No   Sig: Take 1 tablet (5 mg total) by mouth daily   rivastigmine (EXELON) 4 5 MG capsule   No Yes   Sig: Take 1 capsule (4 5 mg total) by mouth daily      Facility-Administered Medications: None       Portions of the record may have been created with voice recognition software  Occasional wrong word or "sound a like" substitutions may have occurred due to the inherent limitations of voice recognition software  Read the chart carefully and recognize, using context, where substitutions have occurred      Electronically signed by:  Kenard Mohs, PGY 2, MD Tam Gonzalez MD  09/19/20 [id number]

## 2020-09-21 ENCOUNTER — APPOINTMENT (EMERGENCY)
Dept: CT IMAGING | Facility: HOSPITAL | Age: 75
End: 2020-09-21
Payer: COMMERCIAL

## 2020-09-21 ENCOUNTER — HOSPITAL ENCOUNTER (EMERGENCY)
Facility: HOSPITAL | Age: 75
Discharge: NURSING, REHAB OR LONG-TERM CARE | End: 2020-09-22
Attending: EMERGENCY MEDICINE | Admitting: EMERGENCY MEDICINE
Payer: COMMERCIAL

## 2020-09-21 VITALS
HEART RATE: 86 BPM | RESPIRATION RATE: 17 BRPM | BODY MASS INDEX: 29.39 KG/M2 | TEMPERATURE: 98.3 F | OXYGEN SATURATION: 98 % | DIASTOLIC BLOOD PRESSURE: 74 MMHG | SYSTOLIC BLOOD PRESSURE: 173 MMHG | WEIGHT: 145.5 LBS

## 2020-09-21 DIAGNOSIS — Z91.81 HISTORY OF RECENT FALL: Primary | ICD-10-CM

## 2020-09-21 LAB
ANION GAP SERPL CALCULATED.3IONS-SCNC: 11 MMOL/L (ref 4–13)
BACTERIA UR QL AUTO: ABNORMAL /HPF
BASOPHILS # BLD AUTO: 0.06 THOUSANDS/ΜL (ref 0–0.1)
BASOPHILS NFR BLD AUTO: 1 % (ref 0–1)
BILIRUB UR QL STRIP: NEGATIVE
BUN SERPL-MCNC: 16 MG/DL (ref 5–25)
CALCIUM SERPL-MCNC: 8.9 MG/DL (ref 8.3–10.1)
CHLORIDE SERPL-SCNC: 103 MMOL/L (ref 100–108)
CLARITY UR: CLEAR
CLARITY, POC: CLEAR
CO2 SERPL-SCNC: 25 MMOL/L (ref 21–32)
COLOR UR: YELLOW
COLOR, POC: YELLOW
CREAT SERPL-MCNC: 0.59 MG/DL (ref 0.6–1.3)
EOSINOPHIL # BLD AUTO: 0.2 THOUSAND/ΜL (ref 0–0.61)
EOSINOPHIL NFR BLD AUTO: 2 % (ref 0–6)
ERYTHROCYTE [DISTWIDTH] IN BLOOD BY AUTOMATED COUNT: 13.6 % (ref 11.6–15.1)
GFR SERPL CREATININE-BSD FRML MDRD: 89 ML/MIN/1.73SQ M
GLUCOSE SERPL-MCNC: 110 MG/DL (ref 65–140)
GLUCOSE UR STRIP-MCNC: NEGATIVE MG/DL
HCT VFR BLD AUTO: 40.4 % (ref 34.8–46.1)
HGB BLD-MCNC: 12.7 G/DL (ref 11.5–15.4)
HGB UR QL STRIP.AUTO: ABNORMAL
IMM GRANULOCYTES # BLD AUTO: 0.05 THOUSAND/UL (ref 0–0.2)
IMM GRANULOCYTES NFR BLD AUTO: 0 % (ref 0–2)
KETONES UR STRIP-MCNC: NEGATIVE MG/DL
LEUKOCYTE ESTERASE UR QL STRIP: NEGATIVE
LYMPHOCYTES # BLD AUTO: 2.19 THOUSANDS/ΜL (ref 0.6–4.47)
LYMPHOCYTES NFR BLD AUTO: 19 % (ref 14–44)
MCH RBC QN AUTO: 27.7 PG (ref 26.8–34.3)
MCHC RBC AUTO-ENTMCNC: 31.4 G/DL (ref 31.4–37.4)
MCV RBC AUTO: 88 FL (ref 82–98)
MONOCYTES # BLD AUTO: 1.02 THOUSAND/ΜL (ref 0.17–1.22)
MONOCYTES NFR BLD AUTO: 9 % (ref 4–12)
NEUTROPHILS # BLD AUTO: 8.03 THOUSANDS/ΜL (ref 1.85–7.62)
NEUTS SEG NFR BLD AUTO: 69 % (ref 43–75)
NITRITE UR QL STRIP: NEGATIVE
NON-SQ EPI CELLS URNS QL MICRO: ABNORMAL /HPF
NRBC BLD AUTO-RTO: 0 /100 WBCS
PH UR STRIP.AUTO: 7 [PH] (ref 4.5–8)
PLATELET # BLD AUTO: 449 THOUSANDS/UL (ref 149–390)
PMV BLD AUTO: 10.1 FL (ref 8.9–12.7)
POTASSIUM SERPL-SCNC: 4.3 MMOL/L (ref 3.5–5.3)
PROT UR STRIP-MCNC: NEGATIVE MG/DL
RBC # BLD AUTO: 4.58 MILLION/UL (ref 3.81–5.12)
RBC #/AREA URNS AUTO: ABNORMAL /HPF
SODIUM SERPL-SCNC: 139 MMOL/L (ref 136–145)
SP GR UR STRIP.AUTO: 1.02 (ref 1–1.03)
TSH SERPL DL<=0.05 MIU/L-ACNC: 1.75 UIU/ML (ref 0.36–3.74)
UROBILINOGEN UR QL STRIP.AUTO: 0.2 E.U./DL
WBC # BLD AUTO: 11.55 THOUSAND/UL (ref 4.31–10.16)
WBC #/AREA URNS AUTO: ABNORMAL /HPF

## 2020-09-21 PROCEDURE — 80048 BASIC METABOLIC PNL TOTAL CA: CPT | Performed by: EMERGENCY MEDICINE

## 2020-09-21 PROCEDURE — 85025 COMPLETE CBC W/AUTO DIFF WBC: CPT | Performed by: EMERGENCY MEDICINE

## 2020-09-21 PROCEDURE — 84443 ASSAY THYROID STIM HORMONE: CPT | Performed by: EMERGENCY MEDICINE

## 2020-09-21 PROCEDURE — 99285 EMERGENCY DEPT VISIT HI MDM: CPT

## 2020-09-21 PROCEDURE — 70450 CT HEAD/BRAIN W/O DYE: CPT

## 2020-09-21 PROCEDURE — G1004 CDSM NDSC: HCPCS

## 2020-09-21 PROCEDURE — 36415 COLL VENOUS BLD VENIPUNCTURE: CPT | Performed by: EMERGENCY MEDICINE

## 2020-09-21 PROCEDURE — 81001 URINALYSIS AUTO W/SCOPE: CPT

## 2020-09-21 PROCEDURE — 93005 ELECTROCARDIOGRAM TRACING: CPT

## 2020-09-21 PROCEDURE — 99285 EMERGENCY DEPT VISIT HI MDM: CPT | Performed by: EMERGENCY MEDICINE

## 2020-09-21 RX ORDER — POLYETHYLENE GLYCOL 3350 17 G/17G
17 POWDER, FOR SOLUTION ORAL DAILY
COMMUNITY

## 2020-09-21 NOTE — ED PROVIDER NOTES
History  Chief Complaint   Patient presents with    Altered Mental Status     patient arrived from [address]  patient here on Friday for fall and cleared to go home  per facility, patient slumping over to R side more, unable to ambulated as normal, and difficulty using R arm  hx dementia  patient only able to say yes or no and that is baseline according to facility  75-year-old female history of dementia presenting from nursing facility with concern for change in appetite and ambulation  Patient had an unwitnessed fall 3 days ago and was evaluated in the ED at that time  Imaging was unrevealing for traumatic injuries including CT head/C-spine/chest/abdomen/pelvis  Per facility, patient has had decreased p o  Intake throughout the weekend, difficulty ambulating (typically uses a walker)  Reportedly had issues using her right hand, however when EMS arrived she was eating using her right hand and she has full strength here in the ED for with her right upper extremity/hand  Patient is unable to provide any history  She is able to state her name and occasionally answer yes or no to questions  She can follow commands normally  She has difficulty with going to seated position, but can keep herself up once there and does not seem to be leaning to the side  She was helped to stand with assistance and was not leaning to one side  Able to bear weight on bilateral lower extremities  Does not appear to have any pain with palpation over any areas      Will repeat CT head to rule out delayed bleed, will get labs to rule out metabolic derangement to cause symptoms, otherwise patient will be discharged back to facility who have capabilities to care for her at this state including new PT/OT needed/more assistance after discussion with staff           Per review of records, patient was seen in the ED on 09/18, 3 days ago  Unwitnessed fall at dementia unit  Not on any blood thinners    CT head, C-spine, chest/abdomen/pelvis all negative for acute traumatic injuries  CT head with stable appearance of likely normal pressure hydrocephalus  New moderate left and trace right pleural effusions  CBC- WBC 15, BMP WNL        Prior to Admission Medications   Prescriptions Last Dose Informant Patient Reported? Taking?   acetaminophen (TYLENOL) 325 mg tablet   No Yes   Sig: Take 2 tablets (650 mg total) by mouth every 6 (six) hours as needed for mild pain, moderate pain, headaches or fever   artificial tear (LUBRIFRESH P M ) 83-15 % ophthalmic ointment   No Yes   Sig: Administer to both eyes daily at bedtime   calcium carbonate-vitamin D (OSCAL-D) 500 mg-200 units per tablet   No Yes   Sig: Take 1 tablet by mouth daily with breakfast   cyanocobalamin 1000 MCG tablet   No Yes   Sig: Take 1 tablet (1,000 mcg total) by mouth daily   docusate sodium (COLACE) 100 mg capsule   No Yes   Sig: Take 1 capsule (100 mg total) by mouth daily   lisinopril (ZESTRIL) 5 mg tablet   No Yes   Sig: Take 1 tablet (5 mg total) by mouth daily   polyethylene glycol (MIRALAX) 17 g packet   Yes Yes   Sig: Take 17 g by mouth daily   rivastigmine (EXELON) 4 5 MG capsule   No Yes   Sig: Take 1 capsule (4 5 mg total) by mouth daily      Facility-Administered Medications: None       Past Medical History:   Diagnosis Date    [facility]     [facility]     Expressive aphasia     Hyperlipidemia     Hypertension     Impaired fasting glucose     Osteoarthritis     Vitamin D deficiency        Past Surgical History:   Procedure Laterality Date    JOINT REPLACEMENT      right knee       History reviewed  No pertinent family history  I have reviewed and agree with the history as documented      E-Cigarette/Vaping     E-Cigarette/Vaping Substances     Social History     Tobacco Use    Smoking status: Never Smoker    Smokeless tobacco: Never Used   Substance Use Topics    Alcohol use: Never     Frequency: Never    Drug use: Never Review of Systems   Unable to perform ROS: Dementia   All other systems reviewed and are negative  Physical Exam  Physical Exam  Vitals signs and nursing note reviewed  Constitutional:       Appearance: She is well-developed  HENT:      Head: Normocephalic and atraumatic  Comments: No scalp ttp     Nose: Nose normal    Eyes:      Conjunctiva/sclera: Conjunctivae normal    Neck:      Musculoskeletal: Normal range of motion and neck supple  Cardiovascular:      Rate and Rhythm: Normal rate and regular rhythm  Heart sounds: Normal heart sounds  Pulmonary:      Effort: Pulmonary effort is normal  No respiratory distress  Breath sounds: Normal breath sounds  No stridor  No wheezing or rales  Chest:      Chest wall: No tenderness  Abdominal:      General: There is no distension  Palpations: Abdomen is soft  Tenderness: There is no abdominal tenderness  There is no guarding or rebound  Musculoskeletal:         General: No deformity  Skin:     General: Skin is warm and dry  Findings: No rash  Neurological:      Mental Status: She is alert  Mental status is at baseline  Cranial Nerves: No cranial nerve deficit or facial asymmetry  Motor: No weakness or abnormal muscle tone  Coordination: Coordination normal       Comments: Oriented to person, not place/time  Strength 5/5 b/l UE  4/5 b/l LE  Sensation grossly intact  Psychiatric:         Mood and Affect: Mood normal          Behavior: Behavior normal          Thought Content:  Thought content normal          Judgment: Judgment normal          Vital Signs  ED Triage Vitals [09/21/20 1820]   Temperature Pulse Respirations Blood Pressure SpO2   98 3 °F (36 8 °C) 93 17 (!) 189/79 99 %      Temp Source Heart Rate Source Patient Position - Orthostatic VS BP Location FiO2 (%)   Oral Monitor Lying Right arm --      Pain Score       No Pain           Vitals:    09/21/20 1820 09/21/20 2100 09/21/20 2300 09/21/20 2358   BP: (!) 189/79 (!) 173/68 (!) 175/70 (!) 173/74   Pulse: 93 86 84 86   Patient Position - Orthostatic VS: Lying Sitting Lying Sitting         Visual Acuity  Visual Acuity      Most Recent Value   L Pupil Size (mm)  3   R Pupil Size (mm)  3          ED Medications  Medications - No data to display    Diagnostic Studies  Results Reviewed     Procedure Component Value Units Date/Time    POCT urinalysis dipstick [[id number]]  (Normal) Resulted:  09/21/20 2115    Lab Status:  Final result Updated:  09/21/20 2124     Color, UA yellow     Clarity, UA clear    Urine Microscopic [[id number]]  (Abnormal) Collected:  09/21/20 2044    Lab Status:  Final result Specimen:  Urine, Other Updated:  09/21/20 2115     RBC, UA 0-1 /hpf      WBC, UA 0-1 /hpf      Epithelial Cells Occasional /hpf      Bacteria, UA Occasional /hpf     Urine Macroscopic, POC [[id number]]  (Abnormal) Collected:  09/21/20 2044    Lab Status:  Final result Specimen:  Urine Updated:  09/21/20 2045     Color, UA Yellow     Clarity, UA Clear     pH, UA 7 0     Leukocytes, UA Negative     Nitrite, UA Negative     Protein, UA Negative mg/dl      Glucose, UA Negative mg/dl      Ketones, UA Negative mg/dl      Urobilinogen, UA 0 2 E U /dl      Bilirubin, UA Negative     Blood, UA Trace     Specific [place], UA 1 020    Narrative:       CLINITEK RESULT    TSH, 3rd generation with Free T4 reflex [[id number]]  (Normal) Collected:  09/21/20 1850    Lab Status:  Final result Specimen:  Blood from Arm, Left Updated:  09/21/20 2000     TSH 3RD GENERATON 1 752 uIU/mL     Narrative:       Patients undergoing fluorescein dye angiography may retain small amounts of fluorescein in the body for 48-72 hours post procedure  Samples containing fluorescein can produce falsely depressed TSH values  If the patient had this procedure,a specimen should be resubmitted post fluorescein clearance        Basic metabolic panel [[id number]]  (Abnormal) Collected:  09/21/20 1850 Lab Status:  Final result Specimen:  Blood from Arm, Left Updated:  09/21/20 1946     Sodium 139 mmol/L      Potassium 4 3 mmol/L      Chloride 103 mmol/L      CO2 25 mmol/L      ANION GAP 11 mmol/L      BUN 16 mg/dL      Creatinine 0 59 mg/dL      Glucose 110 mg/dL      Calcium 8 9 mg/dL      eGFR 89 ml/min/1 73sq m     Narrative:       [place] guidelines for Chronic Kidney Disease (CKD):     Stage 1 with normal or high GFR (GFR > 90 mL/min/1 73 square meters)    Stage 2 Mild CKD (GFR = 60-89 mL/min/1 73 square meters)    Stage 3A Moderate CKD (GFR = 45-59 mL/min/1 73 square meters)    Stage 3B Moderate CKD (GFR = 30-44 mL/min/1 73 square meters)    Stage 4 Severe CKD (GFR = 15-29 mL/min/1 73 square meters)    Stage 5 End Stage CKD (GFR <15 mL/min/1 73 square meters)  Note: GFR calculation is accurate only with a steady state creatinine    CBC and differential [[id number]]  (Abnormal) Collected:  09/21/20 1850    Lab Status:  Final result Specimen:  Blood from Arm, Left Updated:  09/21/20 1928     WBC 11 55 Thousand/uL      RBC 4 58 Million/uL      Hemoglobin 12 7 g/dL      Hematocrit 40 4 %      MCV 88 fL      MCH 27 7 pg      MCHC 31 4 g/dL      RDW 13 6 %      MPV 10 1 fL      Platelets 131 Thousands/uL      nRBC 0 /100 WBCs      Neutrophils Relative 69 %      Immat GRANS % 0 %      Lymphocytes Relative 19 %      Monocytes Relative 9 %      Eosinophils Relative 2 %      Basophils Relative 1 %      Neutrophils Absolute 8 03 Thousands/µL      Immature Grans Absolute 0 05 Thousand/uL      Lymphocytes Absolute 2 19 Thousands/µL      Monocytes Absolute 1 02 Thousand/µL      Eosinophils Absolute 0 20 Thousand/µL      Basophils Absolute 0 06 Thousands/µL                  CT head without contrast   ED Interpretation by Johnny Leigh DO (09/21 1946)   IMPRESSION:        1  No acute intracranial hemorrhage, mass effect or edema  2   Mild, chronic microangiopathy stable    3   Marked ventriculomegaly of the lateral and 3rd ventricles is stable possibly on the basis of atrophy or perhaps a communicating hydrocephalus such as normal pressure hydrocephalus, although the 4th ventricle is normal in size  4   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         Final Result by Roel Bellamy MD (09/21 1942)         1  No acute intracranial hemorrhage, mass effect or edema  2   Mild, chronic microangiopathy stable  3   Marked ventriculomegaly of the lateral and 3rd ventricles is stable possibly on the basis of atrophy or perhaps a communicating hydrocephalus such as normal pressure hydrocephalus, although the 4th ventricle is normal in size  4   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                  Workstation performed: [id number]                    Procedures  Procedures         ED Course  ED Course as of Sep 22 0035   Mon Sep 21, 2020   1826 EKG: NSR @ 92 bpm, normal axis, normal intervals, no ST changes, unchanged from previous on 10/22/19      1834 Per Bettie Fallow, staff report that all weekend pt has been leaning to her R, decreased appetite (not eating 100% of meals as previously), difficulty getting on/off toilet, R back discoloration, walks with walker at baseline      1930 15 9 3 days ago, improved   WBC(!): 11 55                           SBIRT 20yo+      Most Recent Value   SBIRT (25 yo +)   In order to provide better care to our patients, we are screening all of our patients for alcohol and drug use  Would it be okay to ask you these screening questions? No Filed at: 09/21/2020 1920                  MDM  Number of Diagnoses or Management Options  History of recent fall:   Diagnosis management comments: 65 yo F coming from NH with concern for "not acting herself"   Staff that had concern were no longer available to discuss, but staff currently on stated there was concern over not eating as much, difficulty walking and possibly R arm weakness  R arm has no weakness on exam  CTH unchanged with ventriculomegaly  Repeats labs, urine and EKG unremarkable  Facility state they are capable of increasing her amount of care as needed including PT/OT or closer watch/assistance, she is on a dementia unit       Amount and/or Complexity of Data Reviewed  Clinical lab tests: ordered and reviewed  Tests in the radiology section of CPT®: ordered and reviewed  Tests in the medicine section of CPT®: ordered and reviewed  Review and summarize past medical records: yes  Independent visualization of images, tracings, or specimens: yes        Disposition  Final diagnoses:   History of recent fall     Time reflects when diagnosis was documented in both MDM as applicable and the Disposition within this note     Time User Action Codes Description Comment    9/21/2020  9:04 PM Rossy Lunch Add [Z91 81] History of recent fall       ED Disposition     ED Disposition Condition Date/Time Comment    Discharge Stable Mon Sep 21, 2020  9:03 PM Mayuri Keyes discharge to home/self care              Follow-up Information     Follow up With Specialties Details Why [address] [place], [place] [address]kojo Jo MD Family Medicine   [address] [place] [id number]  [phone]            Discharge Medication List as of 9/21/2020  9:06 PM      CONTINUE these medications which have NOT CHANGED    Details   acetaminophen (TYLENOL) 325 mg tablet Take 2 tablets (650 mg total) by mouth every 6 (six) hours as needed for mild pain, moderate pain, headaches or fever, Starting Fri 5/3/2019, Print      artificial tear (LUBRIFRESH P M ) 83-15 % ophthalmic ointment Administer to both eyes daily at bedtime, Starting Fri 5/3/2019, Print      calcium carbonate-vitamin D (OSCAL-D) 500 mg-200 units per tablet Take 1 tablet by mouth daily with breakfast, Starting Sat 5/4/2019, Print      cyanocobalamin 1000 MCG tablet Take 1 tablet (1,000 mcg total) by mouth daily, Starting Fri 5/3/2019, Print      docusate sodium (COLACE) 100 mg capsule Take 1 capsule (100 mg total) by mouth daily, Starting Fri 5/3/2019, Print      lisinopril (ZESTRIL) 5 mg tablet Take 1 tablet (5 mg total) by mouth daily, Starting Fri 5/3/2019, Until Mon 9/21/2020, Print      polyethylene glycol (MIRALAX) 17 g packet Take 17 g by mouth daily, Historical Med      rivastigmine (EXELON) 4 5 MG capsule Take 1 capsule (4 5 mg total) by mouth daily, Starting Fri 5/3/2019, Print           No discharge procedures on file      PDMP Review     None          ED Provider  Electronically Signed by           Lindsay Jolly DO  09/22/20 6058

## 2020-09-22 LAB
ATRIAL RATE: 92 BPM
P AXIS: 71 DEGREES
PR INTERVAL: 146 MS
QRS AXIS: 64 DEGREES
QRSD INTERVAL: 84 MS
QT INTERVAL: 350 MS
QTC INTERVAL: 432 MS
T WAVE AXIS: 55 DEGREES
VENTRICULAR RATE: 92 BPM

## 2020-09-22 PROCEDURE — 93010 ELECTROCARDIOGRAM REPORT: CPT

## 2020-09-22 NOTE — ED NOTES
RN called Arcadio Chatterjee again with no answer  Voicemail left        Nadeem Golden RN  09/21/20 4202

## 2020-09-22 NOTE — DISCHARGE INSTRUCTIONS
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

## 2020-09-22 NOTE — ED NOTES
RN attempted to call nursing home to give update  No answer        Anna Cuevas, TIMOTHY  09/21/20 5484

## 2020-12-31 ENCOUNTER — APPOINTMENT (EMERGENCY)
Dept: CT IMAGING | Facility: HOSPITAL | Age: 75
End: 2020-12-31
Payer: COMMERCIAL

## 2020-12-31 ENCOUNTER — HOSPITAL ENCOUNTER (EMERGENCY)
Facility: HOSPITAL | Age: 75
Discharge: HOME | End: 2020-12-31
Attending: EMERGENCY MEDICINE
Payer: COMMERCIAL

## 2020-12-31 VITALS
OXYGEN SATURATION: 97 % | RESPIRATION RATE: 18 BRPM | SYSTOLIC BLOOD PRESSURE: 173 MMHG | TEMPERATURE: 98.9 F | BODY MASS INDEX: 24.18 KG/M2 | DIASTOLIC BLOOD PRESSURE: 73 MMHG | HEART RATE: 100 BPM | WEIGHT: 119.71 LBS
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
## 2020-12-31 DIAGNOSIS — S01.01XA SCALP LACERATION, INITIAL ENCOUNTER: Primary | ICD-10-CM

## 2020-12-31 DIAGNOSIS — Z86.59 HISTORY OF DEMENTIA: ICD-10-CM

## 2020-12-31 PROCEDURE — 99282 EMERGENCY DEPT VISIT SF MDM: CPT | Performed by: EMERGENCY MEDICINE

## 2020-12-31 PROCEDURE — 12001 RPR S/N/AX/GEN/TRNK 2.5CM/<: CPT | Performed by: EMERGENCY MEDICINE

## 2020-12-31 PROCEDURE — 70450 CT HEAD/BRAIN W/O DYE: CPT

## 2020-12-31 PROCEDURE — 72125 CT NECK SPINE W/O DYE: CPT

## 2020-12-31 PROCEDURE — 99284 EMERGENCY DEPT VISIT MOD MDM: CPT

## 2020-12-31 PROCEDURE — G1004 CDSM NDSC: HCPCS

## 2020-12-31 NOTE — ED PROVIDER NOTES
History  Chief Complaint   Patient presents with    Fall     Patient arrives via ems from [facility], assisted fall with staff member, hit head on dresser, lac with controlled bleeding to posterior head  66 y o  F w/h/o dementia, DM, HTN p/w mechanical fall  [facility] dementia unit  Witnessed fall, but staff member couldn't catch her in time  Struck head on [employer]  Has small lac to left side of head that is not bleeding  Last Tetanus was in 2019 per chart review  History provided by:  EMS personnel  History limited by:  Dementia   used: No    Fall  Mechanism of injury: fall    Injury location:  Head/neck  Head/neck injury location:  Head  Incident location:  Nursing home  Arrived directly from scene: yes    Fall:     Fall occurred:  Standing  Suspicion of alcohol use: no    Suspicion of drug use: no    Tetanus status:  Up to date  Prior to arrival data:     Responsiveness at scene:  Alert    Loss of consciousness: no      Immobilization:  C-collar  Risk factors: no anticoagulation therapy        Prior to Admission Medications   Prescriptions Last Dose Informant Patient Reported?  Taking?   acetaminophen (TYLENOL) 325 mg tablet   No No   Sig: Take 2 tablets (650 mg total) by mouth every 6 (six) hours as needed for mild pain, moderate pain, headaches or fever   artificial tear (LUBRIFRESH P M ) 83-15 % ophthalmic ointment   No No   Sig: Administer to both eyes daily at bedtime   calcium carbonate-vitamin D (OSCAL-D) 500 mg-200 units per tablet   No Yes   Sig: Take 1 tablet by mouth daily with breakfast   collagenase (SANTYL) ointment   Yes Yes   Sig: Apply topically daily Right heel   cyanocobalamin 1000 MCG tablet   No Yes   Sig: Take 1 tablet (1,000 mcg total) by mouth daily   diclofenac sodium (VOLTAREN) 1 %   Yes Yes   Sig: Apply 2 g topically 4 (four) times a day   docusate sodium (COLACE) 100 mg capsule   No Yes   Sig: Take 1 capsule (100 mg total) by mouth daily   lisinopril (ZESTRIL) 5 mg tablet   No Yes   Sig: Take 1 tablet (5 mg total) by mouth daily   polyethylene glycol (MIRALAX) 17 g packet   Yes Yes   Sig: Take 17 g by mouth daily   rivastigmine (EXELON) 4 5 MG capsule   No Yes   Sig: Take 1 capsule (4 5 mg total) by mouth daily      Facility-Administered Medications: None       Past Medical History:   Diagnosis Date    [facility]     [facility]     Expressive aphasia     Hyperlipidemia     Hypertension     Impaired fasting glucose     Osteoarthritis     Vitamin D deficiency        Past Surgical History:   Procedure Laterality Date    JOINT REPLACEMENT      right knee       History reviewed  No pertinent family history  I have reviewed and agree with the history as documented  E-Cigarette/Vaping     E-Cigarette/Vaping Substances     Social History     Tobacco Use    Smoking status: Never Smoker    Smokeless tobacco: Never Used   Substance Use Topics    Alcohol use: Never     Frequency: Never    Drug use: Never       Review of Systems   Unable to perform ROS: Dementia       Physical Exam  Physical Exam  Vitals signs and nursing note reviewed  Constitutional:       General: She is not in acute distress  Appearance: Normal appearance  She is well-developed  She is not ill-appearing, toxic-appearing or diaphoretic  Interventions: Cervical collar in place  HENT:      Head: Normocephalic and atraumatic  No raccoon eyes, Rivera's sign, abrasion, contusion or laceration  Right Ear: Tympanic membrane and external ear normal  No laceration or drainage  No hemotympanum  Left Ear: Tympanic membrane and external ear normal  No laceration or drainage  No hemotympanum  Nose: Nose normal       Mouth/Throat:      Pharynx: No oropharyngeal exudate  Eyes:      General:         Right eye: No discharge  Left eye: No discharge  Conjunctiva/sclera:      Right eye: No hemorrhage       Left eye: No hemorrhage  Pupils: Pupils are equal, round, and reactive to light  Neck:      Musculoskeletal: No spinous process tenderness or muscular tenderness  Cardiovascular:      Rate and Rhythm: Normal rate and regular rhythm  Heart sounds: Normal heart sounds  No murmur  No friction rub  No gallop  Pulmonary:      Effort: Pulmonary effort is normal  No respiratory distress or retractions  Breath sounds: Normal breath sounds  No stridor  No decreased breath sounds, wheezing, rhonchi or rales  Chest:      Chest wall: No lacerations, deformity, swelling, tenderness, crepitus or edema  Abdominal:      General: Bowel sounds are normal  There is no distension  Palpations: Abdomen is soft  Abdomen is not rigid  There is no mass  Tenderness: There is no abdominal tenderness  There is no guarding or rebound  Musculoskeletal: Normal range of motion  General: No tenderness or deformity  Cervical back: She exhibits no bony tenderness  Thoracic back: She exhibits no bony tenderness  Lumbar back: She exhibits no bony tenderness  Skin:     General: Skin is warm and dry  Coloration: Skin is not pale  Findings: Laceration (0 5cm left parietal head, not actively bleeding) present  No abrasion, bruising or ecchymosis  Neurological:      Mental Status: She is alert        Comments: Moves extremities equally         Vital Signs  ED Triage Vitals [12/31/20 0825]   Temperature Pulse Respirations Blood Pressure SpO2   98 9 °F (37 2 °C) 100 18 (!) 173/73 97 %      Temp Source Heart Rate Source Patient Position - Orthostatic VS BP Location FiO2 (%)   Oral Monitor Lying Right arm --      Pain Score       --           Vitals:    12/31/20 0825   BP: (!) 173/73   Pulse: 100   Patient Position - Orthostatic VS: Lying         Visual Acuity      ED Medications  Medications - No data to display    Diagnostic Studies  Results Reviewed     None                 CT head without contrast Final Result by Perez Lewis MD (24/69 9495)         1  No acute intracranial abnormalities appreciated  2   Atrophy with greater degree of dilatation of ventricular system and expected for the degree of sulcal widening raising the possibility of communicating/normal pressure hydrocephalus as has been previously described  Workstation performed: [id number]         CT spine cervical without contrast   Final Result by Perez Lewis MD (12/31 9113)      No cervical spine fracture or traumatic malalignment  Workstation performed: [id number]                    Procedures  Laceration repair    Date/Time: 12/31/2020 8:41 AM  Performed by: Vivian Pastrana DO  Authorized by: Vivian Pastrana DO   Body area: head/neck  Location details: scalp  Laceration length: 0 5 cm  Foreign bodies: no foreign bodies  Tendon involvement: none  Nerve involvement: none  Vascular damage: no  Anesthesia method: none  Sedation:  Patient sedated: no        Procedure Details:  Debridement: none  Skin closure: glue (Glue and hair approximation)  Approximation: close  Approximation difficulty: simple  Patient tolerance: patient tolerated the procedure well with no immediate complications  Comments: Skin glue and hair approximation technique used as lac is small and not gaping and likely easier for follow up than using suture or staple               ED Course                             SBIRT 20yo+      Most Recent Value   SBIRT (22 yo +)   In order to provide better care to our patients, we are screening all of our patients for alcohol and drug use  Would it be okay to ask you these screening questions?   Unable to answer at this time Filed at: 12/31/2020 9173                    MDM    Disposition  Final diagnoses:   Scalp laceration, initial encounter   Fall   History of dementia     Time reflects when diagnosis was documented in both MDM as applicable and the Disposition within this note Time User Action Codes Description Comment    12/31/2020  9:11 AM Nena Dunham [S01 [id number]] Scalp laceration, initial encounter     12/31/2020  9:11 AM [employer] [I11  XXXA] Fall     12/31/2020  9:11 AM Nena Dunham [Z86 59] History of dementia       ED Disposition     ED Disposition Condition Date/Time Comment    Discharge Stable Thu Dec 31, 2020  9:20 AM Evy Bishop discharge to home/self care  Follow-up Information    None         Patient's Medications   Discharge Prescriptions    No medications on file     No discharge procedures on file      PDMP Review     None          ED Provider  Electronically Signed by           Lali Lim 22, DO  12/31/20 1048

## 2021-04-03 ENCOUNTER — APPOINTMENT (EMERGENCY)
Dept: RADIOLOGY | Facility: HOSPITAL | Age: 76
End: 2021-04-03
Payer: COMMERCIAL

## 2021-04-03 ENCOUNTER — APPOINTMENT (EMERGENCY)
Dept: CT IMAGING | Facility: HOSPITAL | Age: 76
End: 2021-04-03
Payer: COMMERCIAL

## 2021-04-03 ENCOUNTER — HOSPITAL ENCOUNTER (EMERGENCY)
Facility: HOSPITAL | Age: 76
Discharge: HOME | End: 2021-04-03
Attending: EMERGENCY MEDICINE
Payer: COMMERCIAL

## 2021-04-03 VITALS
OXYGEN SATURATION: 100 % | DIASTOLIC BLOOD PRESSURE: 87 MMHG | TEMPERATURE: 98.7 F | HEART RATE: 102 BPM | RESPIRATION RATE: 16 BRPM | SYSTOLIC BLOOD PRESSURE: 124 MMHG

## 2021-04-03 DIAGNOSIS — M79.632 LEFT FOREARM PAIN: ICD-10-CM

## 2021-04-03 DIAGNOSIS — M79.605 LEFT LEG PAIN: ICD-10-CM

## 2021-04-03 DIAGNOSIS — M79.652 ACUTE PAIN OF LEFT THIGH: Primary | ICD-10-CM
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
## 2021-04-03 PROCEDURE — 73110 X-RAY EXAM OF WRIST: CPT

## 2021-04-03 PROCEDURE — 73502 X-RAY EXAM HIP UNI 2-3 VIEWS: CPT

## 2021-04-03 PROCEDURE — 99284 EMERGENCY DEPT VISIT MOD MDM: CPT | Performed by: EMERGENCY MEDICINE

## 2021-04-03 PROCEDURE — 73080 X-RAY EXAM OF ELBOW: CPT

## 2021-04-03 PROCEDURE — 70450 CT HEAD/BRAIN W/O DYE: CPT

## 2021-04-03 PROCEDURE — 73564 X-RAY EXAM KNEE 4 OR MORE: CPT

## 2021-04-03 PROCEDURE — 99284 EMERGENCY DEPT VISIT MOD MDM: CPT

## 2021-04-03 RX ORDER — ACETAMINOPHEN 325 MG/1
975 TABLET ORAL ONCE
Status: COMPLETED | OUTPATIENT
Start: 2021-04-03 | End: 2021-04-03

## 2021-04-03 RX ORDER — FUROSEMIDE 20 MG/1
20 TABLET ORAL DAILY
COMMUNITY

## 2021-04-03 RX ORDER — ATORVASTATIN CALCIUM 10 MG/1
10 TABLET, FILM COATED ORAL DAILY
COMMUNITY

## 2021-04-03 RX ADMIN — ACETAMINOPHEN 975 MG: 325 TABLET, FILM COATED ORAL at 18:33

## 2021-04-03 NOTE — ED ATTENDING ATTESTATION
4/3/2021  IEden MD, saw and evaluated the patient  I have discussed the patient with the resident/non-physician practitioner and agree with the resident's/non-physician practitioner's findings, Plan of Care, and MDM as documented in the resident's/non-physician practitioner's note, except where noted  All available labs and Radiology studies were reviewed  I was present for key portions of any procedure(s) performed by the resident/non-physician practitioner and I was immediately available to provide assistance  At this point I agree with the current assessment done in the Emergency Department  I have conducted an independent evaluation of this patient a history and physical is as follows:  Patient with hx of dementia, sent from NH after she had an unwitnessed fall, c/o pain left hip and distal thgih, left forearm; limited hx due to dementia  On exam, no acute distress, no focal neuro deficits, no acute deformity on any of the extremity exam; no acute resp distress  We will get CT Head and C-spine due to unwitnessed fall, XR of left thigh and knee  ED Course     X-rays, CT do not show any acute abnormality  Patient able to walk with support which is normal at baseline according to nursing home staff, contacted by ER resident  Stable for discharge back to nursing home      Critical Care Time  Procedures

## 2021-04-03 NOTE — ED PROVIDER NOTES
History  Chief Complaint   Patient presents with    Fall     Per EMS pt had unwitnessed fall  unknown LOC  No thinners  Pt has hx of dementia and aphasia  Per EMS acting at baseline  Per EMS pt pointed to pain to left arm and leg  49-year-old female history of for the temporal dementia and aphasic at baseline, hypertension, diabetes not on any anti-platelet or anticoagulant medications presenting after unwitnessed ground level fall at nursing facility  Per report, patient was found on the ground  Unknown loss of consciousness or head strike  Patient minimally communicative given baseline aphasia and can answer some yes or no questions  Patient indicates pain to her left lower extremity including her hip and distal thigh  She also reports some pain to her left forearm  Does not recall events of the fall  Unsure if LOC or head strike  Believes she likely tripped  No other complaints at this time  Denies any headache, vision changes, neck pain, chest pain, shortness breath, abdominal pain, nausea vomiting, fever/chills, or any bladder or bowel changes  Prior to Admission Medications   Prescriptions Last Dose Informant Patient Reported? Taking? Skin Protectants, Misc   (CALAZIME SKIN PROTECTANT EX)   Yes Yes   Sig: Apply topically   acetaminophen (TYLENOL) 325 mg tablet   No Yes   Sig: Take 2 tablets (650 mg total) by mouth every 6 (six) hours as needed for mild pain, moderate pain, headaches or fever   artificial tear (LUBRIFRESH P M ) 83-15 % ophthalmic ointment   No Yes   Sig: Administer to both eyes daily at bedtime   atorvastatin (LIPITOR) 10 mg tablet   Yes Yes   Sig: Take 10 mg by mouth daily   calcium carbonate-vitamin D (OSCAL-D) 500 mg-200 units per tablet   No No   Sig: Take 1 tablet by mouth daily with breakfast   cyanocobalamin 1000 MCG tablet   No Yes   Sig: Take 1 tablet (1,000 mcg total) by mouth daily   diclofenac sodium (VOLTAREN) 1 %   Yes Yes   Sig: Apply 2 g topically 4 (four) times a day   docusate sodium (COLACE) 100 mg capsule   No Yes   Sig: Take 1 capsule (100 mg total) by mouth daily   furosemide (LASIX) 20 mg tablet   Yes Yes   Sig: Take 20 mg by mouth daily   lisinopril (ZESTRIL) 5 mg tablet   No No   Sig: Take 1 tablet (5 mg total) by mouth daily   polyethylene glycol (MIRALAX) 17 g packet   Yes Yes   Sig: Take 17 g by mouth daily   rivastigmine (EXELON) 4 5 MG capsule   No Yes   Sig: Take 1 capsule (4 5 mg total) by mouth daily      Facility-Administered Medications: None       Past Medical History:   Diagnosis Date    Dementia [facility] [address] )     [facility]     Expressive aphasia     Hyperlipidemia     Hypertension     Impaired fasting glucose     Osteoarthritis     Vitamin D deficiency        Past Surgical History:   Procedure Laterality Date    JOINT REPLACEMENT      right knee       History reviewed  No pertinent family history  I have reviewed and agree with the history as documented  E-Cigarette/Vaping     E-Cigarette/Vaping Substances     Social History     Tobacco Use    Smoking status: Never Smoker    Smokeless tobacco: Never Used   Substance Use Topics    Alcohol use: Never     Frequency: Never    Drug use: Never        Review of Systems   Constitutional: Negative for appetite change, chills, diaphoresis, fever and unexpected weight change  HENT: Negative for congestion and rhinorrhea  Eyes: Negative for photophobia and visual disturbance  Respiratory: Negative for cough, chest tightness and shortness of breath  Cardiovascular: Negative for chest pain, palpitations and leg swelling  Gastrointestinal: Negative for abdominal distention, abdominal pain, blood in stool, constipation, diarrhea, nausea and vomiting  Genitourinary: Negative for dysuria and hematuria  Musculoskeletal: Positive for arthralgias and myalgias  Negative for back pain, joint swelling, neck pain and neck stiffness     Skin: Negative for color change, pallor, rash and wound  Neurological: Negative for dizziness, syncope, weakness, light-headedness and headaches  Psychiatric/Behavioral: Negative for agitation  All other systems reviewed and are negative  Physical Exam  ED Triage Vitals   Temperature Pulse Respirations Blood Pressure SpO2   04/03/21 1843 04/03/21 1721 04/03/21 1721 04/03/21 1721 04/03/21 1721   98 7 °F (37 1 °C) 105 16 (!) 176/77 99 %      Temp Source Heart Rate Source Patient Position - Orthostatic VS BP Location FiO2 (%)   04/03/21 1843 04/03/21 1929 04/03/21 1721 04/03/21 1721 --   Oral Monitor Lying Left arm       Pain Score       04/03/21 1833       4             Orthostatic Vital Signs  Vitals:    04/03/21 1721 04/03/21 1929 04/03/21 2130 04/03/21 2303   BP: (!) 176/77 157/82 138/93 124/87   Pulse: 105 (!) 111 (!) 122 102   Patient Position - Orthostatic VS: Lying Sitting Lying Lying       Physical Exam  Vitals signs and nursing note reviewed  Constitutional:       General: She is not in acute distress  Appearance: Normal appearance  She is well-developed  She is not ill-appearing, toxic-appearing or diaphoretic  HENT:      Head: Normocephalic and atraumatic  Nose: Nose normal  No congestion or rhinorrhea  Mouth/Throat:      Mouth: Mucous membranes are moist       Pharynx: Oropharynx is clear  No oropharyngeal exudate or posterior oropharyngeal erythema  Eyes:      General: No scleral icterus  Right eye: No discharge  Left eye: No discharge  Extraocular Movements: Extraocular movements intact  Conjunctiva/sclera: Conjunctivae normal       Pupils: Pupils are equal, round, and reactive to light  Neck:      Musculoskeletal: Normal range of motion and neck supple  No neck rigidity or muscular tenderness  Vascular: No JVD  Trachea: No tracheal deviation  Cardiovascular:      Rate and Rhythm: Normal rate and regular rhythm  Heart sounds: Normal heart sounds  No murmur   No friction rub  No gallop  Comments: Normal rate in the 90s with regular rhythm  2+ DP PT and radial pulses   Pulmonary:      Effort: Pulmonary effort is normal  No respiratory distress  Breath sounds: Normal breath sounds  No stridor  No wheezing or rales  Comments: Clear to auscultation bilaterally  Chest:      Chest wall: No tenderness  Abdominal:      General: Bowel sounds are normal  There is no distension  Palpations: Abdomen is soft  Tenderness: There is no abdominal tenderness  There is no right CVA tenderness, left CVA tenderness, guarding or rebound  Comments: Soft, nontender, nondistended  Normal bowel sounds throughout   Musculoskeletal: Normal range of motion  General: No swelling, tenderness, deformity or signs of injury  Right lower leg: No edema  Left lower leg: No edema  Comments: Tenderness palpation left hip and anterior left thigh  Mid forearm tenderness palpation  No external signs of trauma  Range of motion limited left lower extremity secondary to pain  No internal or external rotation and no foreshortening of left lower extremity  No head or midline neck or back tenderness palpation or external signs of trauma  Except for pertinent positives and negatives above below, Remainder of full body head to toe trauma assessment including but not limited to visual assessment, palpation, range of motion of all extremities was otherwise unremarkable  Lymphadenopathy:      Cervical: No cervical adenopathy  Skin:     General: Skin is warm and dry  Coloration: Skin is not pale  Findings: No erythema or rash  Neurological:      General: No focal deficit present  Mental Status: She is alert  Mental status is at baseline  Cranial Nerves: Cranial nerve deficit present  Sensory: No sensory deficit  Motor: No weakness or abnormal muscle tone        Coordination: Coordination normal       Comments: A&Ox1 to person but not place or time  Cranial nerves notable for aphasia  Patient following commands  Strength limited secondary to pain left lower extremity  Otherwise strength intact throughout  Sensation grossly intact  Psychiatric:         Behavior: Behavior normal          Thought Content: Thought content normal          ED Medications  Medications   acetaminophen (TYLENOL) tablet 975 mg (975 mg Oral Given 4/3/21 1833)       Diagnostic Studies  Results Reviewed     None                 CT head without contrast   Final Result by Arlyn Waite MD (04/03 1812)      No acute intracranial abnormality  Unchanged ventriculomegaly  As previously suggested, normal pressure/communicating hydrocephalus is part of the differential diagnosis  Workstation performed: [id number]         XR hip/pelv 2-3 vws left    (Results Pending)   XR knee 4+ views left injury    (Results Pending)   XR elbow 3+ views LEFT    (Results Pending)   XR wrist 3+ views LEFT    (Results Pending)         Procedures  Procedures      ED Course                             SBIRT 22yo+      Most Recent Value   SBIRT (22 yo +)   In order to provide better care to our patients, we are screening all of our patients for alcohol and drug use  Would it be okay to ask you these screening questions? Unable to answer at this time Filed at: 04/03/2021 1725                MDM  Number of Diagnoses or Management Options  Acute pain of left thigh:   Fall from standing, initial encounter:   Left forearm pain:   Left leg pain:   Diagnosis management comments: 78-year-old female history of for the temporal dementia and aphasic at baseline, hypertension, diabetes not on any anti-platelet or anticoagulant medications presenting after unwitnessed ground level fall at nursing facility  Given unwitnessed fall concern for possible head strike despite no outward signs of trauma on exam and high risk of head bleed given age, plan for CT head    Patient with pain of her left lower extremity without foreshortening or rotation of the leg  Plan for x-rays of the left hip, pelvis, knee  Some form discomfort with normal range of motion  Plan for x-rays  Tylenol for pain  Called facility for additional history and information  Chart reviewed  Reassess  CT head no acute process  X-rays no acute process  Discomfort improved after Tylenol  Patient ambulates with walker at baseline per facility  Patient ambulated using walker and was able to take several steps and bear weight on both legs  Arranged transfer back to facility  Given instructions and return precautions  Advised follow-up primary care provider  Patient acknowledged understanding of all written and verbal instructions and return precautions  Discharge         Amount and/or Complexity of Data Reviewed  Clinical lab tests: reviewed  Tests in the radiology section of CPT®: reviewed and ordered  Tests in the medicine section of CPT®: reviewed  Obtain history from someone other than the patient: yes  Review and summarize past medical records: yes  Independent visualization of images, tracings, or specimens: yes    Risk of Complications, Morbidity, and/or Mortality  Presenting problems: high  Diagnostic procedures: high  Management options: high        Disposition  Final diagnoses:   Left leg pain   Acute pain of left thigh   Left forearm pain   Fall from standing, initial encounter     Time reflects when diagnosis was documented in both MDM as applicable and the Disposition within this note     Time User Action Codes Description Comment    4/3/2021  8:11 PM Maddie Renay Add [M79 605] Left leg pain     4/3/2021  8:12 PM Maddie Renay Add [S83 942] Acute pain of left thigh     4/3/2021  8:15 PM Maddie Renay Modify [M79 605] Left leg pain     4/3/2021  8:15 PM Maddie Renay Modify [B76 743] Acute pain of left thigh     4/3/2021  8:15 PM Maddie Renay Add [M59 [id number]] Left forearm pain     4/3/2021  8:15 PM Yancy Reza [W19 XXXA] Fall from standing, initial encounter       ED Disposition     ED Disposition Condition Date/Time Comment    Discharge Stable Sat Apr 3, 2021  8:15 PM Devika Gruber discharge to home/self care  Follow-up Information     Follow up With Specialties Details Why Contact Info Additional 280[address] Kodi Baez MD Family Medicine Schedule an appointment as soon as possible for a visit in 1 week  [address] [facility] [phone]  [address] [facility] Emergency Department Emergency Medicine Go to  If symptoms worsen [facility] [id number] [address] [facility] Emergency Department, [address]  , [place], [address] [facility], [address]          Discharge Medication List as of 4/3/2021  8:18 PM      CONTINUE these medications which have NOT CHANGED    Details   acetaminophen (TYLENOL) 325 mg tablet Take 2 tablets (650 mg total) by mouth every 6 (six) hours as needed for mild pain, moderate pain, headaches or fever, Starting Fri 5/3/2019, Print      artificial tear (LUBRIFRESH P M ) 83-15 % ophthalmic ointment Administer to both eyes daily at bedtime, Starting Fri 5/3/2019, Print      atorvastatin (LIPITOR) 10 mg tablet Take 10 mg by mouth daily, Historical Med      cyanocobalamin 1000 MCG tablet Take 1 tablet (1,000 mcg total) by mouth daily, Starting Fri 5/3/2019, Print      diclofenac sodium (VOLTAREN) 1 % Apply 2 g topically 4 (four) times a day, Historical Med      docusate sodium (COLACE) 100 mg capsule Take 1 capsule (100 mg total) by mouth daily, Starting Fri 5/3/2019, Print      furosemide (LASIX) 20 mg tablet Take 20 mg by mouth daily, Historical Med      polyethylene glycol (MIRALAX) 17 g packet Take 17 g by mouth daily, Historical Med      rivastigmine (EXELON) 4 5 MG capsule Take 1 capsule (4 5 mg total) by mouth daily, Starting Fri 5/3/2019, Print      Skin Protectants, Misc   (CALAZIME SKIN PROTECTANT EX) Apply topically, Historical Med      calcium carbonate-vitamin D (OSCAL-D) 500 mg-200 units per tablet Take 1 tablet by mouth daily with breakfast, Starting Sat 5/4/2019, Print      lisinopril (ZESTRIL) 5 mg tablet Take 1 tablet (5 mg total) by mouth daily, Starting Fri 5/3/2019, Until Thu 12/31/2020, Print           No discharge procedures on file  PDMP Review     None           ED Provider  Attending physically available and evaluated Edgar Jolly I managed the patient along with the ED Attending      Electronically Signed by         Tony Ayala MD  04/03/21 4942

## 2021-04-04 NOTE — DISCHARGE INSTRUCTIONS
Recommend Tylenol as needed for pain  Please follow-up with primary care provider  Please return for significantly worsening pain, significant change in mental status or confusion, or any other concerning signs or symptoms  Please refer to following documents for additional instructions and return precautions

## 2021-08-10 NOTE — PLAN OF CARE
Problem: PHYSICAL THERAPY ADULT  Goal: Performs mobility at highest level of function for planned discharge setting  See evaluation for individualized goals  Description  Treatment/Interventions: Functional transfer training, LE strengthening/ROM, Therapeutic exercise, Endurance training, Cognitive reorientation, Patient/family training, Equipment eval/education, Bed mobility, Gait training, Spoke to nursing, Spoke to case management, Family  Equipment Recommended: Aravind Lowery       See flowsheet documentation for full assessment, interventions and recommendations  Note:   Prognosis: Good  Problem List: Decreased strength, Decreased range of motion, Decreased endurance, Impaired balance, Decreased cognition, Decreased mobility, Decreased coordination, Decreased safety awareness  Assessment: Pt  is a 65 yo F who presents with abdominal pain  Dx: acute pancreatitis  order placed for PT eval and tx, w/ activity order of up w/ A  pt presents w/ comorbidities of htn, left hip pain s/p fall, frontotemporal dementia without behavioral disturbance, primary progressive aphasia, PVD, ambulatory dysfunction and personal factors of mobilizing w/ assistive device, inability to navigate level surfaces w/o external assistance, unable to perform dynamic tasks in community, decreased cognition, limited home support, positive fall history, inability to perform current job functions, inability to perform IADLs, inability to perform ADLs and inability to live alone  pt presents w/ pain, weakness, decreased endurance, impaired cognition, impaired balance, gait deviations, impaired coordination, decreased safety awareness and fall risk   these impairments are evident in findings from physical examination (weakness and impaired coordination), mobility assessment (need for Min assist w/ all phases of mobility when usually mobilizing independently, tolerance to only 300 feet of ambulation and need for cueing for mobility technique), and Barthel Index: 35/100  pt needed input for task focus and mobility technique  pt is at risk for falls due to physical and safety awareness deficits  pt's clinical presentation is unstable/unpredictable (evident in need for assist w/ all phases of mobility when usually mobilizing independently, tolerance to only 300 feet of ambulation, pain impacting overall mobility status, need for input for mobility technique, need for input for task focus and mobility technique and need for input for mobility technique/safety)  pt needs inpatient PT tx to improve mobility deficits  discharge [facility] with PT consult if onsite to reduce fall risk and maximize level of functional independence  Recommendation: (return to prison with PT consult if onsite)     PT - OK to Discharge: Yes    See flowsheet documentation for full assessment  1.75

## 2021-10-21 ENCOUNTER — APPOINTMENT (EMERGENCY)
Dept: CT IMAGING | Facility: HOSPITAL | Age: 76
End: 2021-10-21
Payer: COMMERCIAL

## 2021-10-21 ENCOUNTER — HOSPITAL ENCOUNTER (EMERGENCY)
Facility: HOSPITAL | Age: 76
Discharge: NURSING, REHAB OR LONG-TERM CARE | End: 2021-10-21
Attending: EMERGENCY MEDICINE
Payer: COMMERCIAL

## 2021-10-21 VITALS
RESPIRATION RATE: 16 BRPM | DIASTOLIC BLOOD PRESSURE: 90 MMHG | HEART RATE: 85 BPM | TEMPERATURE: 97.9 F | SYSTOLIC BLOOD PRESSURE: 143 MMHG | OXYGEN SATURATION: 95 %
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
## 2021-10-21 PROCEDURE — 99284 EMERGENCY DEPT VISIT MOD MDM: CPT | Performed by: EMERGENCY MEDICINE

## 2021-10-21 PROCEDURE — 70450 CT HEAD/BRAIN W/O DYE: CPT

## 2021-10-21 PROCEDURE — 99284 EMERGENCY DEPT VISIT MOD MDM: CPT

## 2021-10-26 ENCOUNTER — HOSPITAL ENCOUNTER (EMERGENCY)
Facility: HOSPITAL | Age: 76
Discharge: HOME | End: 2021-10-26
Attending: EMERGENCY MEDICINE
Payer: COMMERCIAL

## 2021-10-26 ENCOUNTER — APPOINTMENT (EMERGENCY)
Dept: CT IMAGING | Facility: HOSPITAL | Age: 76
End: 2021-10-26
Payer: COMMERCIAL

## 2021-10-26 VITALS
RESPIRATION RATE: 18 BRPM | WEIGHT: 118.17 LBS | TEMPERATURE: 98.1 F | HEART RATE: 86 BPM | DIASTOLIC BLOOD PRESSURE: 65 MMHG | OXYGEN SATURATION: 98 % | SYSTOLIC BLOOD PRESSURE: 137 MMHG | BODY MASS INDEX: 23.87 KG/M2
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
## 2021-10-26 LAB — GLUCOSE SERPL-MCNC: 83 MG/DL (ref 65–140)

## 2021-10-26 PROCEDURE — 70450 CT HEAD/BRAIN W/O DYE: CPT

## 2021-10-26 PROCEDURE — 99284 EMERGENCY DEPT VISIT MOD MDM: CPT | Performed by: EMERGENCY MEDICINE

## 2021-10-26 PROCEDURE — 82948 REAGENT STRIP/BLOOD GLUCOSE: CPT

## 2021-10-26 PROCEDURE — 99284 EMERGENCY DEPT VISIT MOD MDM: CPT

## 2021-10-26 PROCEDURE — 72125 CT NECK SPINE W/O DYE: CPT

## 2021-10-27 ENCOUNTER — HOSPITAL ENCOUNTER (EMERGENCY)
Facility: HOSPITAL | Age: 76
Discharge: HOME | End: 2021-10-28
Attending: EMERGENCY MEDICINE | Admitting: EMERGENCY MEDICINE
Payer: COMMERCIAL

## 2021-10-27 ENCOUNTER — APPOINTMENT (EMERGENCY)
Dept: CT IMAGING | Facility: HOSPITAL | Age: 76
End: 2021-10-27
Payer: COMMERCIAL

## 2021-10-27 DIAGNOSIS — R11.10 VOMITING: Primary | ICD-10-CM

## 2021-10-27 LAB
ALBUMIN SERPL BCP-MCNC: 3.3 G/DL (ref 3.5–5)
ALP SERPL-CCNC: 119 U/L (ref 46–116)
ALT SERPL W P-5'-P-CCNC: 26 U/L (ref 12–78)
ANION GAP SERPL CALCULATED.3IONS-SCNC: 8 MMOL/L (ref 4–13)
AST SERPL W P-5'-P-CCNC: 24 U/L (ref 5–45)
BASOPHILS # BLD AUTO: 0.03 THOUSANDS/ΜL (ref 0–0.1)
BASOPHILS NFR BLD AUTO: 0 % (ref 0–1)
BILIRUB SERPL-MCNC: 0.46 MG/DL (ref 0.2–1)
BUN SERPL-MCNC: 18 MG/DL (ref 5–25)
CALCIUM ALBUM COR SERPL-MCNC: 9.7 MG/DL (ref 8.3–10.1)
CALCIUM SERPL-MCNC: 9.1 MG/DL (ref 8.3–10.1)
CHLORIDE SERPL-SCNC: 104 MMOL/L (ref 100–108)
CO2 SERPL-SCNC: 28 MMOL/L (ref 21–32)
CREAT SERPL-MCNC: 0.85 MG/DL (ref 0.6–1.3)
EOSINOPHIL # BLD AUTO: 0.07 THOUSAND/ΜL (ref 0–0.61)
EOSINOPHIL NFR BLD AUTO: 1 % (ref 0–6)
ERYTHROCYTE [DISTWIDTH] IN BLOOD BY AUTOMATED COUNT: 13.5 % (ref 11.6–15.1)
GFR SERPL CREATININE-BSD FRML MDRD: 66 ML/MIN/1.73SQ M
GLUCOSE SERPL-MCNC: 160 MG/DL (ref 65–140)
HCT VFR BLD AUTO: 43.5 % (ref 34.8–46.1)
HGB BLD-MCNC: 14 G/DL (ref 11.5–15.4)
IMM GRANULOCYTES # BLD AUTO: 0.03 THOUSAND/UL (ref 0–0.2)
IMM GRANULOCYTES NFR BLD AUTO: 0 % (ref 0–2)
LIPASE SERPL-CCNC: 102 U/L (ref 73–393)
LYMPHOCYTES # BLD AUTO: 1.11 THOUSANDS/ΜL (ref 0.6–4.47)
LYMPHOCYTES NFR BLD AUTO: 14 % (ref 14–44)
MCH RBC QN AUTO: 28.5 PG (ref 26.8–34.3)
MCHC RBC AUTO-ENTMCNC: 32.2 G/DL (ref 31.4–37.4)
MCV RBC AUTO: 89 FL (ref 82–98)
MONOCYTES # BLD AUTO: 0.47 THOUSAND/ΜL (ref 0.17–1.22)
MONOCYTES NFR BLD AUTO: 6 % (ref 4–12)
NEUTROPHILS # BLD AUTO: 6.05 THOUSANDS/ΜL (ref 1.85–7.62)
NEUTS SEG NFR BLD AUTO: 79 % (ref 43–75)
NRBC BLD AUTO-RTO: 0 /100 WBCS
PLATELET # BLD AUTO: 385 THOUSANDS/UL (ref 149–390)
PMV BLD AUTO: 10 FL (ref 8.9–12.7)
POTASSIUM SERPL-SCNC: 4.1 MMOL/L (ref 3.5–5.3)
PROT SERPL-MCNC: 7.3 G/DL (ref 6.4–8.2)
RBC # BLD AUTO: 4.91 MILLION/UL (ref 3.81–5.12)
SODIUM SERPL-SCNC: 140 MMOL/L (ref 136–145)
TROPONIN I SERPL-MCNC: <0.02 NG/ML
WBC # BLD AUTO: 7.76 THOUSAND/UL (ref 4.31–10.16)

## 2021-10-27 PROCEDURE — 99285 EMERGENCY DEPT VISIT HI MDM: CPT | Performed by: EMERGENCY MEDICINE

## 2021-10-27 PROCEDURE — 83690 ASSAY OF LIPASE: CPT | Performed by: EMERGENCY MEDICINE

## 2021-10-27 PROCEDURE — 80053 COMPREHEN METABOLIC PANEL: CPT | Performed by: EMERGENCY MEDICINE

## 2021-10-27 PROCEDURE — 93005 ELECTROCARDIOGRAM TRACING: CPT

## 2021-10-27 PROCEDURE — 36415 COLL VENOUS BLD VENIPUNCTURE: CPT | Performed by: EMERGENCY MEDICINE

## 2021-10-27 PROCEDURE — 96374 THER/PROPH/DIAG INJ IV PUSH: CPT

## 2021-10-27 PROCEDURE — 99284 EMERGENCY DEPT VISIT MOD MDM: CPT

## 2021-10-27 PROCEDURE — 74177 CT ABD & PELVIS W/CONTRAST: CPT

## 2021-10-27 PROCEDURE — 84484 ASSAY OF TROPONIN QUANT: CPT | Performed by: EMERGENCY MEDICINE

## 2021-10-27 PROCEDURE — 85025 COMPLETE CBC W/AUTO DIFF WBC: CPT | Performed by: EMERGENCY MEDICINE

## 2021-10-27 RX ORDER — ONDANSETRON 4 MG/1
4 TABLET, ORALLY DISINTEGRATING ORAL EVERY 6 HOURS PRN
Qty: 20 TABLET | Refills: 0 | Status: SHIPPED | OUTPATIENT
Start: 2021-10-27

## 2021-10-27 RX ORDER — ONDANSETRON 2 MG/ML
4 INJECTION INTRAMUSCULAR; INTRAVENOUS ONCE
Status: COMPLETED | OUTPATIENT
Start: 2021-10-27 | End: 2021-10-27

## 2021-10-27 RX ADMIN — ONDANSETRON 4 MG: 2 INJECTION INTRAMUSCULAR; INTRAVENOUS at 22:22

## 2021-10-27 RX ADMIN — IOHEXOL 100 ML: 350 INJECTION, SOLUTION INTRAVENOUS at 23:21

## 2021-10-28 VITALS
DIASTOLIC BLOOD PRESSURE: 76 MMHG | OXYGEN SATURATION: 94 % | SYSTOLIC BLOOD PRESSURE: 116 MMHG | RESPIRATION RATE: 18 BRPM | TEMPERATURE: 97.7 F | HEART RATE: 91 BPM

## 2021-10-28 LAB
ATRIAL RATE: 98 BPM
P AXIS: 50 DEGREES
PR INTERVAL: 154 MS
QRS AXIS: 41 DEGREES
QRSD INTERVAL: 76 MS
QT INTERVAL: 344 MS
QTC INTERVAL: 436 MS
T WAVE AXIS: -29 DEGREES
VENTRICULAR RATE: 97 BPM

## 2021-10-28 PROCEDURE — 93010 ELECTROCARDIOGRAM REPORT: CPT | Performed by: INTERNAL MEDICINE

## 2022-02-28 DIAGNOSIS — Z51.5 HOSPICE CARE: Primary | ICD-10-CM

## 2022-02-28 RX ORDER — MORPHINE SULFATE 100 MG/5ML
5 SOLUTION ORAL
Qty: 30 ML | Refills: 0 | Status: SHIPPED | OUTPATIENT
Start: 2022-02-28 | End: 2022-06-06 | Stop reason: SDUPTHER

## 2022-06-06 DIAGNOSIS — Z51.5 HOSPICE CARE: ICD-10-CM

## 2022-06-06 RX ORDER — MORPHINE SULFATE 100 MG/5ML
5 SOLUTION ORAL
Qty: 30 ML | Refills: 0 | Status: SHIPPED | OUTPATIENT
Start: 2022-06-06 | End: 2022-06-07 | Stop reason: SDUPTHER

## 2022-06-07 DIAGNOSIS — Z51.5 HOSPICE CARE: ICD-10-CM

## 2022-06-07 RX ORDER — MORPHINE SULFATE 100 MG/5ML
5 SOLUTION ORAL
Qty: 30 ML | Refills: 0 | Status: SHIPPED | OUTPATIENT
Start: 2022-06-07

## 2022-06-07 RX ORDER — MORPHINE SULFATE 100 MG/5ML
5 SOLUTION ORAL
Qty: 30 ML | Refills: 0 | Status: SHIPPED | OUTPATIENT
Start: 2022-06-07 | End: 2022-06-07 | Stop reason: SDUPTHER

## 2022-10-28 DIAGNOSIS — Z51.5 HOSPICE CARE: ICD-10-CM

## 2022-10-28 RX ORDER — MORPHINE SULFATE 100 MG/5ML
SOLUTION ORAL
Qty: 30 ML | Refills: 0 | Status: SHIPPED | OUTPATIENT
Start: 2022-10-28

## 2023-01-12 NOTE — PROGRESS NOTES
[facility] Podiatry - Progress Note  Patient: Vero Hassan 66 y o  female   MRN: [id number]  PCP: Carie Valentino MD  Unit/Bed#: -01 Encounter: [id number]  Date Of Visit: 19      Assessment:    Podiatric Assessment:   RLE cellulitis   Pressure ulcer of right heel - unstageable    Principal Problem:    Cellulitis of right leg  Active Problems:    Frontotemporal dementia without behavioral disturbance ([address] )    Essential hypertension    Leukocytosis      PLAN:    · Continue local wound care  Applied Betadine wet to dry and DSD to right heel  · Continue offloading bilateral lower extremities with use of Prevalon boots while nonambulatory  · Continue ABX per primary team  Wound culture resulted 4+ growth S  Aureus, 4+ Staph coag neg, 3+ Gram neg rods, 2+ gram positive cocci  · Ordered heel unloading shoe for use on right lower extremity for use during ambulation  · Rest of care per primary team        SUBJECTIVE:     Chief Complaint:   Chief Complaint   Patient presents with    Foot Swelling     Pt was sent in by EMS for having increased swelling in her right foot  Pt c/o pain in right foot       The patient was seen, evaluated, and assessed at bedside today  The patient was awake, alert, and in no acute distress  The patient reports no acute events overnight  The patient reports mild pain at this time  Patient denies N/V/F/chills/SOB/CP  OBJECTIVE:     Vitals:   /69 (BP Location: Right arm)   Pulse 83   Temp 98 5 °F (36 9 °C) (Oral)   Resp 18   Wt 56 6 kg (124 lb 12 5 oz)   SpO2 99%   BMI 25 20 kg/m²     Temp (24hrs), Av 6 °F (37 °C), Min:98 5 °F (36 9 °C), Max:98 6 °F (37 °C)      PHYSICAL EXAM:     General: Alert, cooperative and no distress  Lungs: Non labored breathing  Abdomen: Soft, non-tender  Extremity: NVS at baseline B/l  MSK function at baseline B/l  No calf tenderness noted B/l     RLE:  No acute changes since yesterday's visit    Wound located on the right heel measuring approximately 9 x 10 cm  Wound is currently unstageable  Lung base covered and eschar  No undermining or tunneling present  No drainage noted at this time  wound margin is stable  Periwound is mildly erythematous  No edema noted  Right lower extremity slightly warmer to touch than contralateral side  Clinical Images 11/05/19:    RLE heel    Additional Data:     Labs:    Results from last 7 days   Lab Units 11/05/19  0506 11/04/19  0501   WBC Thousand/uL 11 11* 11 63*   HEMOGLOBIN g/dL 11 9 11 1*   HEMATOCRIT % 36 9 34 5*   PLATELETS Thousands/uL 483* 436*   NEUTROS PCT %  --  70   LYMPHS PCT %  --  17   MONOS PCT %  --  8   EOS PCT %  --  3     Results from last 7 days   Lab Units 11/05/19  0506  11/03/19  1144   POTASSIUM mmol/L 4 0   < > 3 7   CHLORIDE mmol/L 109*   < > 105   CO2 mmol/L 26   < > 27   BUN mg/dL 11   < > 17   CREATININE mg/dL 0 55*   < > 0 60   CALCIUM mg/dL 9 1   < > 9 3   ALK PHOS U/L  --   --  120*   ALT U/L  --   --  31   AST U/L  --   --  22    < > = values in this interval not displayed  Results from last 7 days   Lab Units 11/03/19  1144   INR  1 10       * I Have Reviewed All Lab Data Listed Above  Recent Cultures (last 7 days):     Results from last 7 days   Lab Units 11/04/19  0959 11/03/19  1144   BLOOD CULTURE   --  No Growth at 24 hrs  No Growth at 24 hrs  GRAM STAIN RESULT  No polys seen*  3+ Gram negative rods*  2+ Gram positive cocci in pairs*  --        Imaging: I have personally reviewed pertinent films in PACS  EKG, Pathology, and Other Studies: I have personally reviewed pertinent reports  Today, Patient Was Seen By: Annelise Cardozo DPM    ** Please Note: Portions of the record may have been created with voice recognition software  Occasional wrong word or "sound a like" substitutions may have occurred due to the inherent limitations of voice recognition software   Read the chart carefully and recognize, using context, where substitutions have occurred   ** Silvana Wolf  [facility]  ONCPAINMGT [address]  Scheduled Appointment: 01/24/2023    Abraham Buchanan  [facility]  UROGYN [address]  Scheduled Appointment: 02/13/2023